# Patient Record
Sex: FEMALE | Race: WHITE | NOT HISPANIC OR LATINO | Employment: OTHER | URBAN - METROPOLITAN AREA
[De-identification: names, ages, dates, MRNs, and addresses within clinical notes are randomized per-mention and may not be internally consistent; named-entity substitution may affect disease eponyms.]

---

## 2021-11-16 ENCOUNTER — TELEPHONE (OUTPATIENT)
Dept: NEUROSURGERY | Facility: CLINIC | Age: 66
End: 2021-11-16
Payer: COMMERCIAL

## 2021-11-18 ENCOUNTER — OFFICE VISIT (OUTPATIENT)
Dept: NEUROSURGERY | Facility: CLINIC | Age: 66
End: 2021-11-18
Payer: COMMERCIAL

## 2021-11-18 VITALS
RESPIRATION RATE: 18 BRPM | BODY MASS INDEX: 57.52 KG/M2 | HEIGHT: 60 IN | HEART RATE: 76 BPM | DIASTOLIC BLOOD PRESSURE: 74 MMHG | SYSTOLIC BLOOD PRESSURE: 128 MMHG | WEIGHT: 293 LBS

## 2021-11-18 DIAGNOSIS — M54.50 NON-SPECIFIC LOW BACK PAIN: ICD-10-CM

## 2021-11-18 DIAGNOSIS — M81.0 AGE-RELATED OSTEOPOROSIS WITHOUT CURRENT PATHOLOGICAL FRACTURE: ICD-10-CM

## 2021-11-18 DIAGNOSIS — M51.36 DDD (DEGENERATIVE DISC DISEASE), LUMBAR: ICD-10-CM

## 2021-11-18 DIAGNOSIS — E66.01 MORBID OBESITY WITH BMI OF 50.0-59.9, ADULT: Primary | ICD-10-CM

## 2021-11-18 DIAGNOSIS — M51.34 DDD (DEGENERATIVE DISC DISEASE), THORACIC: ICD-10-CM

## 2021-11-18 DIAGNOSIS — Z12.31 BREAST CANCER SCREENING BY MAMMOGRAM: ICD-10-CM

## 2021-11-18 DIAGNOSIS — M54.16 LUMBAR RADICULOPATHY: ICD-10-CM

## 2021-11-18 PROCEDURE — 99213 OFFICE O/P EST LOW 20 MIN: CPT | Mod: PBBFAC,PN | Performed by: STUDENT IN AN ORGANIZED HEALTH CARE EDUCATION/TRAINING PROGRAM

## 2021-11-18 PROCEDURE — 99999 PR PBB SHADOW E&M-EST. PATIENT-LVL III: ICD-10-PCS | Mod: PBBFAC,,, | Performed by: STUDENT IN AN ORGANIZED HEALTH CARE EDUCATION/TRAINING PROGRAM

## 2021-11-18 PROCEDURE — 99205 PR OFFICE/OUTPT VISIT, NEW, LEVL V, 60-74 MIN: ICD-10-PCS | Mod: S$GLB,,, | Performed by: STUDENT IN AN ORGANIZED HEALTH CARE EDUCATION/TRAINING PROGRAM

## 2021-11-18 PROCEDURE — 99999 PR PBB SHADOW E&M-EST. PATIENT-LVL III: CPT | Mod: PBBFAC,,, | Performed by: STUDENT IN AN ORGANIZED HEALTH CARE EDUCATION/TRAINING PROGRAM

## 2021-11-18 PROCEDURE — 99205 OFFICE O/P NEW HI 60 MIN: CPT | Mod: S$GLB,,, | Performed by: STUDENT IN AN ORGANIZED HEALTH CARE EDUCATION/TRAINING PROGRAM

## 2021-11-18 RX ORDER — ROSUVASTATIN CALCIUM 10 MG/1
10 TABLET, COATED ORAL NIGHTLY
COMMUNITY
Start: 2021-10-25

## 2021-11-18 RX ORDER — LEVOTHYROXINE SODIUM 175 UG/1
TABLET ORAL
COMMUNITY
Start: 2021-10-25

## 2021-11-19 ENCOUNTER — TELEPHONE (OUTPATIENT)
Dept: NEUROSURGERY | Facility: CLINIC | Age: 66
End: 2021-11-19
Payer: COMMERCIAL

## 2021-11-26 ENCOUNTER — HOSPITAL ENCOUNTER (OUTPATIENT)
Dept: RADIOLOGY | Facility: HOSPITAL | Age: 66
Discharge: HOME | End: 2021-11-26
Attending: STUDENT IN AN ORGANIZED HEALTH CARE EDUCATION/TRAINING PROGRAM
Payer: COMMERCIAL

## 2021-11-26 DIAGNOSIS — M81.0 AGE-RELATED OSTEOPOROSIS WITHOUT CURRENT PATHOLOGICAL FRACTURE: ICD-10-CM

## 2021-11-26 DIAGNOSIS — M51.36 DDD (DEGENERATIVE DISC DISEASE), LUMBAR: ICD-10-CM

## 2021-11-26 PROCEDURE — 77080 DEXA BONE DENSITY SPINE HIP: ICD-10-PCS | Mod: 26,,, | Performed by: RADIOLOGY

## 2021-11-26 PROCEDURE — 77080 DXA BONE DENSITY AXIAL: CPT | Mod: 26,,, | Performed by: RADIOLOGY

## 2021-11-26 PROCEDURE — 77080 DXA BONE DENSITY AXIAL: CPT | Mod: TC,PO

## 2021-12-09 ENCOUNTER — PATIENT MESSAGE (OUTPATIENT)
Dept: NEUROSURGERY | Facility: CLINIC | Age: 66
End: 2021-12-09
Payer: COMMERCIAL

## 2021-12-20 ENCOUNTER — OFFICE VISIT (OUTPATIENT)
Dept: PAIN MEDICINE | Facility: CLINIC | Age: 66
End: 2021-12-20
Payer: COMMERCIAL

## 2021-12-20 VITALS
HEIGHT: 60 IN | SYSTOLIC BLOOD PRESSURE: 173 MMHG | WEIGHT: 293 LBS | HEART RATE: 73 BPM | BODY MASS INDEX: 57.52 KG/M2 | DIASTOLIC BLOOD PRESSURE: 81 MMHG

## 2021-12-20 DIAGNOSIS — M54.16 LUMBAR RADICULOPATHY: Primary | ICD-10-CM

## 2021-12-20 DIAGNOSIS — Z01.818 PRE-OP TESTING: ICD-10-CM

## 2021-12-20 DIAGNOSIS — M51.36 DDD (DEGENERATIVE DISC DISEASE), LUMBAR: ICD-10-CM

## 2021-12-20 PROCEDURE — 99204 PR OFFICE/OUTPT VISIT, NEW, LEVL IV, 45-59 MIN: ICD-10-PCS | Mod: CS,S$GLB,, | Performed by: ANESTHESIOLOGY

## 2021-12-20 PROCEDURE — 99214 OFFICE O/P EST MOD 30 MIN: CPT | Mod: PBBFAC,PN | Performed by: ANESTHESIOLOGY

## 2021-12-20 PROCEDURE — 99999 PR PBB SHADOW E&M-EST. PATIENT-LVL IV: ICD-10-PCS | Mod: PBBFAC,,, | Performed by: ANESTHESIOLOGY

## 2021-12-20 PROCEDURE — 99204 OFFICE O/P NEW MOD 45 MIN: CPT | Mod: CS,S$GLB,, | Performed by: ANESTHESIOLOGY

## 2021-12-20 PROCEDURE — 99999 PR PBB SHADOW E&M-EST. PATIENT-LVL IV: CPT | Mod: PBBFAC,,, | Performed by: ANESTHESIOLOGY

## 2021-12-20 RX ORDER — ALPRAZOLAM 0.5 MG/1
1 TABLET, ORALLY DISINTEGRATING ORAL ONCE AS NEEDED
Status: CANCELLED | OUTPATIENT
Start: 2022-01-03 | End: 2033-05-31

## 2021-12-20 NOTE — H&P (VIEW-ONLY)
[facility] New Patient Evaluation    Referred by: Dr. Singletary  Reason for referral: back pain    CC:   Chief Complaint   Patient presents with    Back Pain     LBP; across lower back; extends down legs sometimes; denies numbness;       No flowsheet data found.    HPI:   Safia Ibarra is a 66 y.o. female who complains of back pain    Onset: 6-7 months  Inciting Event: none  Progression: since onset, pain is gradually worsening  Current Pain Score: 6/10  Typical Range: 2-10/10  Timing: constant  Quality: shooting  Radiation: yes, down the back both legs  Associated numbness or weakness: yes numbness   Exacerbated by: walking  Allievated by: laying down  Is Pain Level Acceptable?: No    Previous Therapies:  PT/OT: yes  HEP: yes  Interventions:   Surgery:  Medications:   - NSAIDS:   - MSK Relaxants:   - TCAs:   - SNRIs:   - Topicals:   - Anticonvulsants:  - Opioids:     History:    Current Outpatient Medications:     levothyroxine (SYNTHROID, LEVOTHROID) 175 MCG tablet, , Disp: , Rfl:     rosuvastatin (CRESTOR) 10 MG tablet, Take 10 mg by mouth nightly., Disp: , Rfl:     Past Medical History:   Diagnosis Date    HTN (hypertension)     Hyperlipidemia     Hypothyroidism     Osteoporosis        Past Surgical History:   Procedure Laterality Date    BREAST BIOPSY Left     core Dr. Hannah benign over 5 yrs ago    HYSTERECTOMY         Family History   Problem Relation Age of Onset    Uterine cancer Mother     Breast cancer Paternal Aunt        Social History     Socioeconomic History    Marital status:    Tobacco Use    Smoking status: Never Smoker    Smokeless tobacco: Never Used       Review of patient's allergies indicates:   Allergen Reactions    Bactrim [sulfamethoxazole-trimethoprim]        Review of Systems:  General ROS: negative for - fever  Psychological ROS: negative for - hostility  Hematological and Lymphatic ROS: negative for - bleeding problems  Endocrine ROS: negative for  - unexpected weight changes  Respiratory ROS: no cough, shortness of breath, or wheezing  Cardiovascular ROS: no chest pain or dyspnea on exertion  Gastrointestinal ROS: no abdominal pain, change in bowel habits, or black or bloody stools  Musculoskeletal ROS: negative for - muscular weakness  Neurological ROS: positive for - numbness/tingling  Dermatological ROS: negative for rash    Physical Exam:  Vitals:    12/20/21 1356   BP: (!) 173/81   Pulse: 73   Weight: (!) 144.4 kg (318 lb 7.3 oz)   Height: 5' (1.524 m)   PainSc:   6     Body mass index is 62.19 kg/m².     Gen: NAD  Gait: gait intact  Psych:  Mood appropriate for given condition  HEENT: eyes anicteric   GI: Abd soft  CV: RRR  Lungs: breathing unlabored   ROM: limited AROM of the L spine in all planes, full ROM at ankles, knees and hips  Lumbar flexion 90 degrees, extension 50 degrees, side bending 30 degrees.    Sensation: intact to light touch in all dermatomes tested from L2-S1 bilaterally  Reflexes: 2+ b/l patella and 0/0 b/l achilles  Palpation: Diffusely tender over lumbar paraspinals  -TTP over the b/l greater trochanters and bilateral SI joint  Tone: normal in the b/l knees and hips   Skin: intact  Extremities: No edema in b/l ankles or hands  Provacative tests:        Right Left   L2/3 Iliacus Hip flexion  5  5   L3/4 Qudratus Femoris Knee Extension  5  5   L4/5 Tib Anterior Ankle Dorsiflexion   5  5   L5/S1 Extensor Hallicus Longus Great toe extension  5  5                 S1/S2 Gastroc/Soleus Plantar Flexion  5  5       Imaging:  MRI lumbar spine 11/15/21  FINDINGS  There is a minor curvature in the lumbar region convex to the patient's left.  There are hypertrophic facet changes with facet arthropathy throughout the lumbar spine.  There is disc space narrowing primarily at L1-L2, L2-L3 and L3-L4.  There is generally stenotic changes in part on a developmental basis throughout the lumbosacral spine with other factors present as well.  The distal  cord terminates at L1-L2 and appears unremarkable.    At L1-L2, there is mild to moderate lumbar stenosis present.  Hypertrophic facet ligamentous changes as well as a broad-based disc protrusion measured in the AP dimension up to 4.8 mm.  There is mild foraminal restriction bilaterally without root contact.     At L2-L3, there is moderate lumbar stenosis present more prominently paracentrally to the right where there is a prominent disc protrusion/extrusion measured up to 6.6 mm likely compressing the traversing nerve root on the right.  There is mild foraminal restriction bilaterally without root contact within the neural foramina.    At L3-L4, there is moderate lumbar stenosis produced by broad based disc protrusion measured up to 5.4 mm in the AP dimension.  Hypertrophic facet ligamentous changes contribute to the lumbar stenosis.  There is foraminal restriction bilaterally more so on the right where there does appear to be contact of the exiting nerve root in the right foramen.    At L4-L5,  there is moderate to marked lumbar stenosis produced by hypertrophic facet and hypertrophic ligamentous changes.  There is a broad-based disc protrusion measured up to 3.9 mm in the AP dimension.  There is foraminal restriction bilaterally without definite root contact within the neural foramina.    At L5-S1,  there is a right paracentral disc protrusion measured up to 3.6 mm which may contact the traversing nerve root on the right.  There is no significant central stenosis.  There is foraminal restriction bilaterally.  There does appear to be contact of the exiting nerve root along its inferior margin in the left foramen.     Labs:  BMP  No results found for: NA, K, CL, CO2, BUN, CREATININE, CALCIUM, ANIONGAP, ESTGFRAFRICA, EGFRNONAA  No results found for: ALT, AST, GGT, ALKPHOS, BILITOT    Assessment:   Problem List Items Addressed This Visit    None     Visit Diagnoses     Lumbar radiculopathy    -  Primary    DDD  (degenerative disc disease), lumbar              66 y.o. year old female presents to the office with back pain, constant, 6/10, radiating down the back of both legs.  + numbness, no weakness.    - on exam full strength and intact sensation to light touch  - I independently reviewed her lumbar MRI and it is c/w L4-5 moderate to marked lumbar stenosis  - she has failed to get relief with formal PT.  - her pain is limiting her mobility and interfering with her ADL's  - we will schedule for L5/S1 NATASHA. The risks and benefits of this intervention, and alternative therapies were discussed with the patient.  The discussion of risks included infection, bleeding, need for additional procedures or surgery, nerve damage.  Questions regarding the procedure, risks, expected outcome, and possible side effects were solicited and answered to the patient's satisfaction.  Kassie Keen wishes to proceed with the injection or procedure.  Written consent was obtained.  - follow up 2 weeks post injection     : Not applicable    Yobani Nieves M.D.  Interventional Pain Medicine / Anesthesiology

## 2022-01-03 ENCOUNTER — HOSPITAL ENCOUNTER (OUTPATIENT)
Dept: RADIOLOGY | Facility: HOSPITAL | Age: 67
Discharge: HOME | End: 2022-01-03
Attending: ANESTHESIOLOGY
Payer: MEDICARE

## 2022-01-03 ENCOUNTER — HOSPITAL ENCOUNTER (OUTPATIENT)
Facility: HOSPITAL | Age: 67
Discharge: HOME | End: 2022-01-03
Attending: ANESTHESIOLOGY | Admitting: ANESTHESIOLOGY
Payer: COMMERCIAL

## 2022-01-03 VITALS
RESPIRATION RATE: 16 BRPM | DIASTOLIC BLOOD PRESSURE: 62 MMHG | OXYGEN SATURATION: 97 % | SYSTOLIC BLOOD PRESSURE: 139 MMHG | WEIGHT: 293 LBS | TEMPERATURE: 98 F | BODY MASS INDEX: 57.52 KG/M2 | HEIGHT: 60 IN | HEART RATE: 73 BPM

## 2022-01-03 DIAGNOSIS — Z01.818 PREOP TESTING: ICD-10-CM

## 2022-01-03 DIAGNOSIS — M54.16 LUMBAR RADICULOPATHY: Primary | ICD-10-CM

## 2022-01-03 DIAGNOSIS — M54.16 LUMBAR RADICULOPATHY: ICD-10-CM

## 2022-01-03 LAB
CTP QC/QA: YES
SARS-COV-2 AG RESP QL IA.RAPID: NEGATIVE

## 2022-01-03 PROCEDURE — A4216 STERILE WATER/SALINE, 10 ML: HCPCS | Mod: PO | Performed by: ANESTHESIOLOGY

## 2022-01-03 PROCEDURE — 76000 FLUOROSCOPY <1 HR PHYS/QHP: CPT | Mod: TC,PO

## 2022-01-03 PROCEDURE — 62323 NJX INTERLAMINAR LMBR/SAC: CPT | Mod: ,,, | Performed by: ANESTHESIOLOGY

## 2022-01-03 PROCEDURE — 62323 NJX INTERLAMINAR LMBR/SAC: CPT | Mod: PO | Performed by: ANESTHESIOLOGY

## 2022-01-03 PROCEDURE — 25000003 PHARM REV CODE 250: Mod: PO | Performed by: ANESTHESIOLOGY

## 2022-01-03 PROCEDURE — 25500020 PHARM REV CODE 255: Mod: PO | Performed by: ANESTHESIOLOGY

## 2022-01-03 PROCEDURE — 62323 PR INJ LUMBAR/SACRAL, W/IMAGING GUIDANCE: ICD-10-PCS | Mod: ,,, | Performed by: ANESTHESIOLOGY

## 2022-01-03 PROCEDURE — 63600175 PHARM REV CODE 636 W HCPCS: Mod: PO | Performed by: ANESTHESIOLOGY

## 2022-01-03 RX ORDER — SODIUM CHLORIDE 9 MG/ML
INJECTION, SOLUTION INTRAMUSCULAR; INTRAVENOUS; SUBCUTANEOUS
Status: DISCONTINUED | OUTPATIENT
Start: 2022-01-03 | End: 2022-01-03 | Stop reason: HOSPADM

## 2022-01-03 RX ORDER — ALPRAZOLAM 0.5 MG/1
1 TABLET, ORALLY DISINTEGRATING ORAL ONCE AS NEEDED
Status: DISCONTINUED | OUTPATIENT
Start: 2022-01-03 | End: 2022-01-03 | Stop reason: HOSPADM

## 2022-01-03 RX ORDER — METHYLPREDNISOLONE ACETATE 80 MG/ML
INJECTION, SUSPENSION INTRA-ARTICULAR; INTRALESIONAL; INTRAMUSCULAR; SOFT TISSUE
Status: DISCONTINUED | OUTPATIENT
Start: 2022-01-03 | End: 2022-01-03 | Stop reason: HOSPADM

## 2022-01-03 RX ORDER — LIDOCAINE HYDROCHLORIDE 10 MG/ML
INJECTION, SOLUTION EPIDURAL; INFILTRATION; INTRACAUDAL; PERINEURAL
Status: DISCONTINUED | OUTPATIENT
Start: 2022-01-03 | End: 2022-01-03 | Stop reason: HOSPADM

## 2022-01-03 RX ORDER — HYDROCODONE BITARTRATE AND ACETAMINOPHEN 5; 325 MG/1; MG/1
1 TABLET ORAL EVERY 6 HOURS PRN
COMMUNITY
End: 2023-05-09

## 2022-01-03 NOTE — OP NOTE
"Procedure Note    Procedure Date: 1/3/2022    Procedure Performed:  L5/S1 lumbar interlaminar epidural steroid injection under fluoroscopy.    Indications: Patient has failed conservative therapy.      Pre-op diagnosis: Lumbar Radiculopathy    Post-op diagnosis: same    Physician: Yobani Nieves MD    IV Sedation medications: none    Medications injected: depomedrol 80mg, 1% Lidocaine 1ml, 2 mL sterile, preservative-free normal saline.    Local anesthetic used: 1% Lidocaine, 1 ml, 8.4% sodium bicarbonate 0.25ml    Estimated Blood Loss: none    Complications:  none    Technique:  The patient was interviewed in the holding area and Risks/Benefits were discussed, including, but not limited to, the possibility of new or different pain, bleeding or infection.   All questions were answered.  The patient understood and accepted risks.  Consent was verfied.  A time-out was taken to identify patient and procedure prior to starting the procedure. The patient was placed in the prone position on the fluoroscopy table. The area of the lumbar spine was prepped with Chloraprep and draped in a sterile manner. The L5/S1 interspace was identified and marked under AP fluoroscopy. The skin and subcutaneous tissues overlying the targeted interspace were anesthetized with 3-5 mL of 1% lidocaine using a 25G, 1.5" needle.  A 18G, 6" Tuohy epidural needle was directed toward the interspace under fluoroscopic guidance until the ligamentum flavum was engaged. From this point, a loss of resistance technique with a glass syringe and saline was used to identify entrance of the needle into the epidural space. Once loss of resistance was observed 1 mL of contrast solution was injected. An appropriate epidurogram was noted.  A 4 mL mixture consisting of saline, 1 mL 1% Lidocaine and 80 mg of depomedrol was injected slowly and without resistance.  The needle was flushed with normal saline and removed. The contrast was seen to be displaced after " injection. Patient was awake/responsive during all injections.  The patient tolerated the procedure well and was transferred to the [facility] in stable condition.  The patient was monitored after the procedure and was given post-procedure and discharge instructions to follow at home. The patient was discharged in a stable condition.

## 2022-01-03 NOTE — PLAN OF CARE
Patient tolerating oral liquids without difficulty. No apparent s&s of distress noted at this time, no complaints voiced at this time. Injection site free from redness and drainage.  Discharge instructions reviewed with patient/family/friend with good verbal feedback received. Patient ready for discharge    
VSS, all questions answered. Denies recent fever or illness. Pt states ready for procedure.  
no

## 2022-01-03 NOTE — DISCHARGE SUMMARY
[facility]  Discharge Note  Short Stay    Admit Date: 1/3/2022    Discharge Date: 1/3/2022    Attending Physician: Yobani Nieves     Discharge Provider: Yobani Nieves    Diagnoses:  There are no hospital problems to display for this patient.      Discharged Condition: Good    Final Diagnoses: Lumbar radiculopathy [M54.16]    Disposition: Home or Self Care    Hospital Course: No complications, uneventful    Outcome of Hospitalization, Treatment, Procedure, or Surgery:  Patient was admitted for outpatient interventional pain management procedure. The patient tolerated the procedure well with no complications.    Follow up/Patient Instructions:  Follow up as scheduled in Pain Management office in 2-3 weeks.  Patient has received instructions and follow up date and time.    Medications:  Continue previous medications    Discharge Procedure Orders   Notify your health care provider if you experience any of the following:  temperature >100.4     Notify your health care provider if you experience any of the following:  persistent nausea and vomiting or diarrhea     Notify your health care provider if you experience any of the following:  severe uncontrolled pain     Notify your health care provider if you experience any of the following:  redness, tenderness, or signs of infection (pain, swelling, redness, odor or green/yellow discharge around incision site)     Notify your health care provider if you experience any of the following:  difficulty breathing or increased cough     Notify your health care provider if you experience any of the following:  severe persistent headache     Notify your health care provider if you experience any of the following:  worsening rash     Notify your health care provider if you experience any of the following:  persistent dizziness, light-headedness, or visual disturbances     Notify your health care provider if you experience any of the following:  increased confusion or  weakness     SARS Coronavirus 2 Antigen, POCT Manual Read     Activity as tolerated

## 2022-01-03 NOTE — INTERVAL H&P NOTE
The patient has been examined and the H&P has been reviewed:    I concur with the findings and no changes have occurred since H&P was written.    There are no hospital problems to display for this patient.

## 2022-01-03 NOTE — DISCHARGE INSTRUCTIONS
PAIN MANAGEMENT    Home care instructions   Apply ice pack to the injection site for 20 minutes prior for the first 24 hours for soreness/discomfort at injection site   DO NOT USE HEAT FOR 24 HOURS   Keep site clean and dry for 24 hours, remove bandaid when desired   Do not drive until tomorrow  Take care when walking after a lumbar injection     STEROIDS OR RADIOFREQUENCY    May take 10-14 days for full effects  Avoid strenuous exercises for 2 days    Resume Aspirin, Plavix, or Coumadin the day after the procedure unless other wise instructed  Resume home medication as prescribed today      CALL PHYSICIAN FOR:   Severe increase in your usual pain or appearance of new pain   Prolonged or increasing weakness or numbness in the legs or arms   Fever greater than 100 degrees F.   Drainage from the incision site, redness, active bleeding or increased swelling at the injection site   Headache that increases when your head is upright and decreases when you lie flat    FOR EMERGENCIES:   Go directly to Emergency Department for Shortness of breath, chest pain, or problems breathing

## 2022-01-07 ENCOUNTER — PATIENT MESSAGE (OUTPATIENT)
Dept: BARIATRICS | Facility: CLINIC | Age: 67
End: 2022-01-07
Payer: COMMERCIAL

## 2022-09-07 ENCOUNTER — TELEPHONE (OUTPATIENT)
Dept: PAIN MEDICINE | Facility: CLINIC | Age: 67
End: 2022-09-07
Payer: COMMERCIAL

## 2022-09-07 NOTE — TELEPHONE ENCOUNTER
----- Message from Katya Green sent at 9/7/2022  3:44 PM CDT -----  Contact: [phone]  Type: Needs Medical Advice  Who Called: Pt     Best Call Back Number: [phone]    Additional Information: Pt is calling to see if she can move her appt on Friday 8AM to possibly 9am because her ride wont be avil to get her there. Pls call back and advise

## 2022-09-07 NOTE — TELEPHONE ENCOUNTER
----- Message from Yuriy Russ sent at 9/7/2022  8:46 AM CDT -----  Type: Needs Medical Advice  Who Called: Pt   Symptoms (please be specific):   How long has patient had these symptoms:    Pharmacy name and phone #:    Best Call Back Number: [phone]  Additional Information: Pt requesting a call back concerning for appt for Friday for an injection.Pt wants to see if she can come an hour later that day.

## 2022-09-07 NOTE — TELEPHONE ENCOUNTER
----- Message from Brooklynn Herzog sent at 9/7/2022  4:49 PM CDT -----  Contact: Pt at [phone]  Type: Needs Medical Advice  Who Called:  Pt  Symptoms (please be specific):  keep appt  Pharmacy name and phone #:    Best Call Back Number: [phone]  Additional Information: Jessica is gonna keep her appt on Friday

## 2022-09-07 NOTE — TELEPHONE ENCOUNTER
Call returned to Pt who called to see if she can move her appt on Friday 8AM to possibly 9am because of issues with transportation. No answer. V/M left to inform her there are no other avail appt's on that day.

## 2022-09-09 ENCOUNTER — OFFICE VISIT (OUTPATIENT)
Dept: PAIN MEDICINE | Facility: CLINIC | Age: 67
End: 2022-09-09
Payer: COMMERCIAL

## 2022-09-09 ENCOUNTER — HOSPITAL ENCOUNTER (OUTPATIENT)
Dept: RADIOLOGY | Facility: HOSPITAL | Age: 67
Discharge: HOME | End: 2022-09-09
Attending: ANESTHESIOLOGY
Payer: COMMERCIAL

## 2022-09-09 VITALS
BODY MASS INDEX: 57.52 KG/M2 | HEART RATE: 68 BPM | SYSTOLIC BLOOD PRESSURE: 199 MMHG | DIASTOLIC BLOOD PRESSURE: 95 MMHG | HEIGHT: 60 IN | WEIGHT: 293 LBS

## 2022-09-09 DIAGNOSIS — M48.062 SPINAL STENOSIS OF LUMBAR REGION WITH NEUROGENIC CLAUDICATION: ICD-10-CM

## 2022-09-09 DIAGNOSIS — M54.16 LUMBAR RADICULOPATHY: ICD-10-CM

## 2022-09-09 DIAGNOSIS — M54.16 LUMBAR RADICULOPATHY: Primary | ICD-10-CM

## 2022-09-09 PROCEDURE — 99214 PR OFFICE/OUTPT VISIT, EST, LEVL IV, 30-39 MIN: ICD-10-PCS | Mod: S$GLB,,, | Performed by: ANESTHESIOLOGY

## 2022-09-09 PROCEDURE — 99214 OFFICE O/P EST MOD 30 MIN: CPT | Mod: S$GLB,,, | Performed by: ANESTHESIOLOGY

## 2022-09-09 PROCEDURE — 99999 PR PBB SHADOW E&M-EST. PATIENT-LVL IV: CPT | Mod: PBBFAC,,, | Performed by: ANESTHESIOLOGY

## 2022-09-09 PROCEDURE — 72114 XR LUMBAR SPINE 5 VIEW WITH FLEX AND EXT: ICD-10-PCS | Mod: 26,,, | Performed by: RADIOLOGY

## 2022-09-09 PROCEDURE — 72114 X-RAY EXAM L-S SPINE BENDING: CPT | Mod: TC,PO

## 2022-09-09 PROCEDURE — 99999 PR PBB SHADOW E&M-EST. PATIENT-LVL IV: ICD-10-PCS | Mod: PBBFAC,,, | Performed by: ANESTHESIOLOGY

## 2022-09-09 PROCEDURE — 99214 OFFICE O/P EST MOD 30 MIN: CPT | Mod: PBBFAC,PN | Performed by: ANESTHESIOLOGY

## 2022-09-09 PROCEDURE — 72114 X-RAY EXAM L-S SPINE BENDING: CPT | Mod: 26,,, | Performed by: RADIOLOGY

## 2022-09-09 RX ORDER — ALPRAZOLAM 0.5 MG/1
1 TABLET, ORALLY DISINTEGRATING ORAL ONCE AS NEEDED
Status: CANCELLED | OUTPATIENT
Start: 2022-09-09 | End: 2034-02-05

## 2022-09-09 RX ORDER — LEVOTHYROXINE SODIUM 88 UG/1
88 TABLET ORAL
COMMUNITY
End: 2022-09-09

## 2022-09-09 RX ORDER — TIZANIDINE 4 MG/1
4 TABLET ORAL EVERY 8 HOURS PRN
COMMUNITY
Start: 2022-08-29 | End: 2023-08-09

## 2022-09-09 RX ORDER — LEVOTHYROXINE SODIUM 100 UG/1
100 TABLET ORAL
COMMUNITY
End: 2022-09-09

## 2022-09-09 NOTE — H&P (VIEW-ONLY)
[facility] Follow Up Evaluation    Referred by: Dr. Singletary  Reason for referral: back pain    CC:   Chief Complaint   Patient presents with    Low-back Pain    Mid-back Pain      Last 3 PDI Scores 9/9/2022   Pain Disability Index (PDI) 41       Interval HPI 9/9/22:  Ms. Ibarra returns the office today for follow-up.  She is status post L5-S1 NATASHA on 01/03/2022.  She reports she had about 80% relief of her pain following that injection however it has worn off.  Today she reports lower back pain that is worse with walking.  She can also get cramping in her legs.  Denies any new numbness or weakness.    HPI:   Safia Ibarra is a 67 y.o. female who complains of back pain    Onset: 6-7 months  Inciting Event: none  Progression: since onset, pain is gradually worsening  Current Pain Score: 6/10  Typical Range: 2-10/10  Timing: constant  Quality: shooting  Radiation: yes, down the back both legs  Associated numbness or weakness: yes numbness   Exacerbated by: walking  Allievated by: laying down  Is Pain Level Acceptable?: No    Previous Therapies:  PT/OT: yes  HEP: yes  Interventions:   Surgery:  Medications:   - NSAIDS:   - MSK Relaxants:   - TCAs:   - SNRIs:   - Topicals:   - Anticonvulsants:  - Opioids:     History:    Current Outpatient Medications:     HYDROcodone-acetaminophen (NORCO) 5-325 mg per tablet, Take 1 tablet by mouth every 6 (six) hours as needed for Pain., Disp: , Rfl:     levothyroxine (SYNTHROID, LEVOTHROID) 175 MCG tablet, , Disp: , Rfl:     rosuvastatin (CRESTOR) 10 MG tablet, Take 10 mg by mouth nightly., Disp: , Rfl:     tiZANidine (ZANAFLEX) 4 MG tablet, Take 4 mg by mouth every 8 (eight) hours as needed., Disp: , Rfl:     Past Medical History:   Diagnosis Date    HTN (hypertension)     Hyperlipidemia     Hypothyroidism     Osteoporosis        Past Surgical History:   Procedure Laterality Date    BREAST BIOPSY Left     core Dr. Hannah benign over 5 yrs ago    EPIDURAL STEROID  INJECTION INTO LUMBAR SPINE N/A 1/3/2022    Procedure: Injection-steroid-epidural-lumbar L5/S1;  Surgeon: Yobani Nieves MD;  Location: [facility];  Service: Pain Management;  Laterality: N/A;    HYSTERECTOMY         Family History   Problem Relation Age of Onset    Uterine cancer Mother     Breast cancer Paternal Aunt        Social History     Socioeconomic History    Marital status:    Tobacco Use    Smoking status: Never    Smokeless tobacco: Never       Review of patient's allergies indicates:   Allergen Reactions    Bactrim [sulfamethoxazole-trimethoprim] Itching       Review of Systems:  General ROS: negative for - fever  Psychological ROS: negative for - hostility  Hematological and Lymphatic ROS: negative for - bleeding problems  Endocrine ROS: negative for - unexpected weight changes  Respiratory ROS: no cough, shortness of breath, or wheezing  Cardiovascular ROS: no chest pain or dyspnea on exertion  Gastrointestinal ROS: no abdominal pain, change in bowel habits, or black or bloody stools  Musculoskeletal ROS: negative for - muscular weakness  Neurological ROS: positive for - numbness/tingling  Dermatological ROS: negative for rash    Physical Exam:  Vitals:    09/09/22 0750 09/09/22 0757   BP: (!) 212/95 (!) 199/95   Pulse: 71 68   Weight: (!) 139.4 kg (307 lb 5.1 oz)    Height: 5' (1.524 m)    PainSc:   9    PainLoc: Back      Body mass index is 60.02 kg/m².     Gen: NAD  Gait: gait intact  Psych:  Mood appropriate for given condition  HEENT: eyes anicteric   GI: Abd soft  CV: RRR  Lungs: breathing unlabored   ROM: limited AROM of the L spine in all planes, full ROM at ankles, knees and hips  Lumbar flexion 90 degrees, extension 50 degrees, side bending 30 degrees.    Sensation: intact to light touch in all dermatomes tested from L2-S1 bilaterally  Reflexes: 2+ b/l patella and 0/0 b/l achilles  Palpation: Diffusely tender over lumbar paraspinals   Tone: normal in the b/l knees and hips   Skin:  intact  Extremities: No edema in b/l ankles or hands  Provacative tests:        Right Left   L2/3 Iliacus Hip flexion  5  5   L3/4 Qudratus Femoris Knee Extension  5  5   L4/5 Tib Anterior Ankle Dorsiflexion   5  5   L5/S1 Extensor Hallicus Longus Great toe extension  5  5                 S1/S2 Gastroc/Soleus Plantar Flexion  5  5       Imaging:  MRI lumbar spine 11/15/21  FINDINGS  There is a minor curvature in the lumbar region convex to the patient's left.  There are hypertrophic facet changes with facet arthropathy throughout the lumbar spine.  There is disc space narrowing primarily at L1-L2, L2-L3 and L3-L4.  There is generally stenotic changes in part on a developmental basis throughout the lumbosacral spine with other factors present as well.  The distal cord terminates at L1-L2 and appears unremarkable.    At L1-L2, there is mild to moderate lumbar stenosis present.  Hypertrophic facet ligamentous changes as well as a broad-based disc protrusion measured in the AP dimension up to 4.8 mm.  There is mild foraminal restriction bilaterally without root contact.     At L2-L3, there is moderate lumbar stenosis present more prominently paracentrally to the right where there is a prominent disc protrusion/extrusion measured up to 6.6 mm likely compressing the traversing nerve root on the right.  There is mild foraminal restriction bilaterally without root contact within the neural foramina.    At L3-L4, there is moderate lumbar stenosis produced by broad based disc protrusion measured up to 5.4 mm in the AP dimension.  Hypertrophic facet ligamentous changes contribute to the lumbar stenosis.  There is foraminal restriction bilaterally more so on the right where there does appear to be contact of the exiting nerve root in the right foramen.    At L4-L5,  there is moderate to marked lumbar stenosis produced by hypertrophic facet and hypertrophic ligamentous changes.  There is a broad-based disc protrusion measured up  to 3.9 mm in the AP dimension.  There is foraminal restriction bilaterally without definite root contact within the neural foramina.    At L5-S1,  there is a right paracentral disc protrusion measured up to 3.6 mm which may contact the traversing nerve root on the right.  There is no significant central stenosis.  There is foraminal restriction bilaterally.  There does appear to be contact of the exiting nerve root along its inferior margin in the left foramen.     Labs:  BMP  No results found for: NA, K, CL, CO2, BUN, CREATININE, CALCIUM, ANIONGAP, ESTGFRAFRICA, EGFRNONAA  No results found for: ALT, AST, GGT, ALKPHOS, BILITOT    Assessment:   Problem List Items Addressed This Visit    None  Visit Diagnoses       Lumbar radiculopathy    -  Primary    Relevant Orders    X-Ray Lumbar Complete Including Flex And Ext    Spinal stenosis of lumbar region with neurogenic claudication                  67 y.o. year old female presents to the office with back pain, constant, 6/10, radiating down the back of both legs.  + numbness, no weakness.    9/9/22 - Ms. Ibarra returns the office today for follow-up.  She is status post L5-S1 NATASHA on 01/03/2022.  She reports she had about 80% relief of her pain following that injection however it has worn off.  Today she reports lower back pain that is worse with walking.  She can also get cramping in her legs.  Denies any new numbness or weakness.    - on exam full strength and intact sensation to light touch  - I independently reviewed her lumbar MRI and it is c/w L4-5 moderate to marked lumbar stenosis  - she has completed formal physical therapy in the past then did not get significant relief from this  - she has previously seen Neurosurgery who referred her for conservative treatment options  - her pain is limiting her mobility and interfering with her ADL's  - we will schedule for repeat L5/S1 NATASHA. The risks and benefits of this intervention, and alternative therapies were discussed  with the patient.  The discussion of risks included infection, bleeding, need for additional procedures or surgery, nerve damage.  Questions regarding the procedure, risks, expected outcome, and possible side effects were solicited and answered to the patient's satisfaction.  Kassie Keen wishes to proceed with the injection or procedure.  Written consent was obtained.  - follow up 2 weeks post injection     : Not applicable    Yobani Nieves M.D.  Interventional Pain Medicine / Anesthesiology

## 2022-09-09 NOTE — PROGRESS NOTES
[facility] Follow Up Evaluation    Referred by: Dr. Singletary  Reason for referral: back pain    CC:   Chief Complaint   Patient presents with    Low-back Pain    Mid-back Pain      Last 3 PDI Scores 9/9/2022   Pain Disability Index (PDI) 41       Interval HPI 9/9/22:  Ms. Ibarra returns the office today for follow-up.  She is status post L5-S1 NATASHA on 01/03/2022.  She reports she had about 80% relief of her pain following that injection however it has worn off.  Today she reports lower back pain that is worse with walking.  She can also get cramping in her legs.  Denies any new numbness or weakness.    HPI:   Safia Ibarra is a 67 y.o. female who complains of back pain    Onset: 6-7 months  Inciting Event: none  Progression: since onset, pain is gradually worsening  Current Pain Score: 6/10  Typical Range: 2-10/10  Timing: constant  Quality: shooting  Radiation: yes, down the back both legs  Associated numbness or weakness: yes numbness   Exacerbated by: walking  Allievated by: laying down  Is Pain Level Acceptable?: No    Previous Therapies:  PT/OT: yes  HEP: yes  Interventions:   Surgery:  Medications:   - NSAIDS:   - MSK Relaxants:   - TCAs:   - SNRIs:   - Topicals:   - Anticonvulsants:  - Opioids:     History:    Current Outpatient Medications:     HYDROcodone-acetaminophen (NORCO) 5-325 mg per tablet, Take 1 tablet by mouth every 6 (six) hours as needed for Pain., Disp: , Rfl:     levothyroxine (SYNTHROID, LEVOTHROID) 175 MCG tablet, , Disp: , Rfl:     rosuvastatin (CRESTOR) 10 MG tablet, Take 10 mg by mouth nightly., Disp: , Rfl:     tiZANidine (ZANAFLEX) 4 MG tablet, Take 4 mg by mouth every 8 (eight) hours as needed., Disp: , Rfl:     Past Medical History:   Diagnosis Date    HTN (hypertension)     Hyperlipidemia     Hypothyroidism     Osteoporosis        Past Surgical History:   Procedure Laterality Date    BREAST BIOPSY Left     core Dr. Hannah benign over 5 yrs ago    EPIDURAL STEROID  INJECTION INTO LUMBAR SPINE N/A 1/3/2022    Procedure: Injection-steroid-epidural-lumbar L5/S1;  Surgeon: Yobani Nieves MD;  Location: [facility];  Service: Pain Management;  Laterality: N/A;    HYSTERECTOMY         Family History   Problem Relation Age of Onset    Uterine cancer Mother     Breast cancer Paternal Aunt        Social History     Socioeconomic History    Marital status:    Tobacco Use    Smoking status: Never    Smokeless tobacco: Never       Review of patient's allergies indicates:   Allergen Reactions    Bactrim [sulfamethoxazole-trimethoprim] Itching       Review of Systems:  General ROS: negative for - fever  Psychological ROS: negative for - hostility  Hematological and Lymphatic ROS: negative for - bleeding problems  Endocrine ROS: negative for - unexpected weight changes  Respiratory ROS: no cough, shortness of breath, or wheezing  Cardiovascular ROS: no chest pain or dyspnea on exertion  Gastrointestinal ROS: no abdominal pain, change in bowel habits, or black or bloody stools  Musculoskeletal ROS: negative for - muscular weakness  Neurological ROS: positive for - numbness/tingling  Dermatological ROS: negative for rash    Physical Exam:  Vitals:    09/09/22 0750 09/09/22 0757   BP: (!) 212/95 (!) 199/95   Pulse: 71 68   Weight: (!) 139.4 kg (307 lb 5.1 oz)    Height: 5' (1.524 m)    PainSc:   9    PainLoc: Back      Body mass index is 60.02 kg/m².     Gen: NAD  Gait: gait intact  Psych:  Mood appropriate for given condition  HEENT: eyes anicteric   GI: Abd soft  CV: RRR  Lungs: breathing unlabored   ROM: limited AROM of the L spine in all planes, full ROM at ankles, knees and hips  Lumbar flexion 90 degrees, extension 50 degrees, side bending 30 degrees.    Sensation: intact to light touch in all dermatomes tested from L2-S1 bilaterally  Reflexes: 2+ b/l patella and 0/0 b/l achilles  Palpation: Diffusely tender over lumbar paraspinals   Tone: normal in the b/l knees and hips   Skin:  intact  Extremities: No edema in b/l ankles or hands  Provacative tests:        Right Left   L2/3 Iliacus Hip flexion  5  5   L3/4 Qudratus Femoris Knee Extension  5  5   L4/5 Tib Anterior Ankle Dorsiflexion   5  5   L5/S1 Extensor Hallicus Longus Great toe extension  5  5                 S1/S2 Gastroc/Soleus Plantar Flexion  5  5       Imaging:  MRI lumbar spine 11/15/21  FINDINGS  There is a minor curvature in the lumbar region convex to the patient's left.  There are hypertrophic facet changes with facet arthropathy throughout the lumbar spine.  There is disc space narrowing primarily at L1-L2, L2-L3 and L3-L4.  There is generally stenotic changes in part on a developmental basis throughout the lumbosacral spine with other factors present as well.  The distal cord terminates at L1-L2 and appears unremarkable.    At L1-L2, there is mild to moderate lumbar stenosis present.  Hypertrophic facet ligamentous changes as well as a broad-based disc protrusion measured in the AP dimension up to 4.8 mm.  There is mild foraminal restriction bilaterally without root contact.     At L2-L3, there is moderate lumbar stenosis present more prominently paracentrally to the right where there is a prominent disc protrusion/extrusion measured up to 6.6 mm likely compressing the traversing nerve root on the right.  There is mild foraminal restriction bilaterally without root contact within the neural foramina.    At L3-L4, there is moderate lumbar stenosis produced by broad based disc protrusion measured up to 5.4 mm in the AP dimension.  Hypertrophic facet ligamentous changes contribute to the lumbar stenosis.  There is foraminal restriction bilaterally more so on the right where there does appear to be contact of the exiting nerve root in the right foramen.    At L4-L5,  there is moderate to marked lumbar stenosis produced by hypertrophic facet and hypertrophic ligamentous changes.  There is a broad-based disc protrusion measured up  to 3.9 mm in the AP dimension.  There is foraminal restriction bilaterally without definite root contact within the neural foramina.    At L5-S1,  there is a right paracentral disc protrusion measured up to 3.6 mm which may contact the traversing nerve root on the right.  There is no significant central stenosis.  There is foraminal restriction bilaterally.  There does appear to be contact of the exiting nerve root along its inferior margin in the left foramen.     Labs:  BMP  No results found for: NA, K, CL, CO2, BUN, CREATININE, CALCIUM, ANIONGAP, ESTGFRAFRICA, EGFRNONAA  No results found for: ALT, AST, GGT, ALKPHOS, BILITOT    Assessment:   Problem List Items Addressed This Visit    None  Visit Diagnoses       Lumbar radiculopathy    -  Primary    Relevant Orders    X-Ray Lumbar Complete Including Flex And Ext    Spinal stenosis of lumbar region with neurogenic claudication                  67 y.o. year old female presents to the office with back pain, constant, 6/10, radiating down the back of both legs.  + numbness, no weakness.    9/9/22 - Ms. Ibarra returns the office today for follow-up.  She is status post L5-S1 NATASHA on 01/03/2022.  She reports she had about 80% relief of her pain following that injection however it has worn off.  Today she reports lower back pain that is worse with walking.  She can also get cramping in her legs.  Denies any new numbness or weakness.    - on exam full strength and intact sensation to light touch  - I independently reviewed her lumbar MRI and it is c/w L4-5 moderate to marked lumbar stenosis  - she has completed formal physical therapy in the past then did not get significant relief from this  - she has previously seen Neurosurgery who referred her for conservative treatment options  - her pain is limiting her mobility and interfering with her ADL's  - we will schedule for repeat L5/S1 NATASHA. The risks and benefits of this intervention, and alternative therapies were discussed  with the patient.  The discussion of risks included infection, bleeding, need for additional procedures or surgery, nerve damage.  Questions regarding the procedure, risks, expected outcome, and possible side effects were solicited and answered to the patient's satisfaction.  Kassie Keen wishes to proceed with the injection or procedure.  Written consent was obtained.  - follow up 2 weeks post injection     : Not applicable    Yobani Nieves M.D.  Interventional Pain Medicine / Anesthesiology

## 2022-09-15 RX ORDER — AMLODIPINE BESYLATE 5 MG/1
5 TABLET ORAL DAILY
COMMUNITY
End: 2024-03-12

## 2022-09-21 ENCOUNTER — HOSPITAL ENCOUNTER (OUTPATIENT)
Dept: RADIOLOGY | Facility: HOSPITAL | Age: 67
Discharge: HOME | End: 2022-09-21
Attending: ANESTHESIOLOGY | Admitting: ANESTHESIOLOGY
Payer: COMMERCIAL

## 2022-09-21 ENCOUNTER — HOSPITAL ENCOUNTER (OUTPATIENT)
Facility: HOSPITAL | Age: 67
Discharge: HOME | End: 2022-09-21
Attending: ANESTHESIOLOGY | Admitting: ANESTHESIOLOGY
Payer: COMMERCIAL

## 2022-09-21 DIAGNOSIS — M54.50 LOWER BACK PAIN: ICD-10-CM

## 2022-09-21 DIAGNOSIS — M54.16 LUMBAR RADICULOPATHY: Primary | ICD-10-CM

## 2022-09-21 PROCEDURE — 62323 NJX INTERLAMINAR LMBR/SAC: CPT | Mod: ,,, | Performed by: ANESTHESIOLOGY

## 2022-09-21 PROCEDURE — 25500020 PHARM REV CODE 255: Mod: PO | Performed by: ANESTHESIOLOGY

## 2022-09-21 PROCEDURE — 25000003 PHARM REV CODE 250: Mod: PO | Performed by: ANESTHESIOLOGY

## 2022-09-21 PROCEDURE — 76000 FLUOROSCOPY <1 HR PHYS/QHP: CPT | Mod: TC,PO

## 2022-09-21 PROCEDURE — 62323 PR INJ LUMBAR/SACRAL, W/IMAGING GUIDANCE: ICD-10-PCS | Mod: ,,, | Performed by: ANESTHESIOLOGY

## 2022-09-21 PROCEDURE — 62323 NJX INTERLAMINAR LMBR/SAC: CPT | Mod: PO | Performed by: ANESTHESIOLOGY

## 2022-09-21 PROCEDURE — 63600175 PHARM REV CODE 636 W HCPCS: Mod: PO | Performed by: ANESTHESIOLOGY

## 2022-09-21 RX ORDER — METHYLPREDNISOLONE ACETATE 80 MG/ML
INJECTION, SUSPENSION INTRA-ARTICULAR; INTRALESIONAL; INTRAMUSCULAR; SOFT TISSUE
Status: DISCONTINUED | OUTPATIENT
Start: 2022-09-21 | End: 2022-09-21 | Stop reason: HOSPADM

## 2022-09-21 RX ORDER — SODIUM CHLORIDE 0.9 G/100ML
IRRIGANT IRRIGATION
Status: DISCONTINUED | OUTPATIENT
Start: 2022-09-21 | End: 2022-09-21 | Stop reason: HOSPADM

## 2022-09-21 RX ORDER — ALPRAZOLAM 0.5 MG/1
1 TABLET, ORALLY DISINTEGRATING ORAL ONCE AS NEEDED
Status: COMPLETED | OUTPATIENT
Start: 2022-09-21 | End: 2022-09-21

## 2022-09-21 RX ORDER — LIDOCAINE HYDROCHLORIDE 10 MG/ML
INJECTION, SOLUTION EPIDURAL; INFILTRATION; INTRACAUDAL; PERINEURAL
Status: DISCONTINUED | OUTPATIENT
Start: 2022-09-21 | End: 2022-09-21 | Stop reason: HOSPADM

## 2022-09-21 RX ADMIN — ALPRAZOLAM 1 MG: 0.5 TABLET, ORALLY DISINTEGRATING ORAL at 01:09

## 2022-09-21 NOTE — DISCHARGE SUMMARY
[facility]  Discharge Note  Short Stay    Admit Date: 9/21/2022    Discharge Date: 9/21/2022    Attending Physician: Yobani Nieves     Discharge Provider: Yobani Nieves    Diagnoses:  There are no hospital problems to display for this patient.      Discharged Condition: Good    Final Diagnoses: Lumbar radiculopathy [M54.16]    Disposition: Home or Self Care    Hospital Course: No complications, uneventful    Outcome of Hospitalization, Treatment, Procedure, or Surgery:  Patient was admitted for outpatient interventional pain management procedure. The patient tolerated the procedure well with no complications.    Follow up/Patient Instructions:  Follow up as scheduled in Pain Management office in 2-3 weeks.  Patient has received instructions and follow up date and time.    Medications:  Continue previous medications    Discharge Procedure Orders   Notify your health care provider if you experience any of the following:  temperature >100.4     Notify your health care provider if you experience any of the following:  persistent nausea and vomiting or diarrhea     Notify your health care provider if you experience any of the following:  severe uncontrolled pain     Notify your health care provider if you experience any of the following:  redness, tenderness, or signs of infection (pain, swelling, redness, odor or green/yellow discharge around incision site)     Notify your health care provider if you experience any of the following:  difficulty breathing or increased cough     Notify your health care provider if you experience any of the following:  severe persistent headache     Notify your health care provider if you experience any of the following:  worsening rash     Notify your health care provider if you experience any of the following:  persistent dizziness, light-headedness, or visual disturbances     Notify your health care provider if you experience any of the following:  increased confusion or  weakness     Activity as tolerated

## 2022-09-21 NOTE — OP NOTE
"Procedure Note    Procedure Date: 9/21/2022    Procedure Performed:  L5/S1 lumbar interlaminar epidural steroid injection under fluoroscopy.    Indications: Patient has failed conservative therapy.      Pre-op diagnosis: Lumbar Radiculopathy    Post-op diagnosis: same    Physician: Yobani Nieves MD    IV anxiolysis medications: none    Medications injected: depomedrol 80mg, 1% Lidocaine 1ml, 2 mL sterile, preservative-free normal saline.    Local anesthetic used: 1% Lidocaine, 1 ml, 8.4% sodium bicarbonate 0.25ml    Estimated Blood Loss: none    Complications:  none    Technique:  The patient was interviewed in the holding area and Risks/Benefits were discussed, including, but not limited to, the possibility of new or different pain, bleeding or infection.   All questions were answered.  The patient understood and accepted risks.  Consent was verfied.  A time-out was taken to identify patient and procedure prior to starting the procedure. The patient was placed in the prone position on the fluoroscopy table. The area of the lumbar spine was prepped with Chloraprep and draped in a sterile manner. The L5/S1 interspace was identified and marked under AP fluoroscopy. The skin and subcutaneous tissues overlying the targeted interspace were anesthetized with 3-5 mL of 1% lidocaine using a 25G, 1.5" needle.  A 18G 6"Tuohy epidural needle was directed toward the interspace under fluoroscopic guidance until the ligamentum flavum was engaged. From this point, a loss of resistance technique with a glass syringe and saline was used to identify entrance of the needle into the epidural space. Once loss of resistance was observed 1 mL of contrast solution was injected. An appropriate epidurogram was noted.  A 4 mL mixture consisting of saline, 1 mL 1% Lidocaine and 80 mg of depomedrol was injected slowly and without resistance.  The needle was flushed with normal saline and removed. The contrast was seen to be displaced after " injection. Patient was awake/responsive during all injections.  The patient tolerated the procedure well and was transferred to the [facility] in stable condition.  The patient was monitored after the procedure and was given post-procedure and discharge instructions to follow at home. The patient was discharged in a stable condition.

## 2022-09-22 VITALS
HEIGHT: 60 IN | TEMPERATURE: 98 F | DIASTOLIC BLOOD PRESSURE: 67 MMHG | SYSTOLIC BLOOD PRESSURE: 149 MMHG | RESPIRATION RATE: 17 BRPM | WEIGHT: 293 LBS | HEART RATE: 79 BPM | OXYGEN SATURATION: 98 % | BODY MASS INDEX: 57.52 KG/M2

## 2022-10-03 ENCOUNTER — TELEPHONE (OUTPATIENT)
Dept: PAIN MEDICINE | Facility: CLINIC | Age: 67
End: 2022-10-03
Payer: COMMERCIAL

## 2022-10-03 NOTE — TELEPHONE ENCOUNTER
----- Message from Zamzam Keenan sent at 9/30/2022 10:59 AM CDT -----  Contact: Pt  Type: Needs Medical Advice    Who Called:  Pt    Best Call Back Number: [phone]    Additional Information: Please call back regarding pt's appt on 10/7.  Thanks.

## 2022-10-03 NOTE — TELEPHONE ENCOUNTER
2 wk procedure f/u rescheduled for 10-7-22 at 11am with   FER Armas. Pt verbalized understanding.

## 2023-01-04 ENCOUNTER — OFFICE VISIT (OUTPATIENT)
Dept: CARDIOLOGY | Facility: CLINIC | Age: 68
End: 2023-01-04
Payer: COMMERCIAL

## 2023-01-04 VITALS
WEIGHT: 293 LBS | DIASTOLIC BLOOD PRESSURE: 60 MMHG | SYSTOLIC BLOOD PRESSURE: 138 MMHG | BODY MASS INDEX: 57.52 KG/M2 | HEIGHT: 60 IN | HEART RATE: 63 BPM

## 2023-01-04 DIAGNOSIS — I10 PRIMARY HYPERTENSION: Chronic | ICD-10-CM

## 2023-01-04 DIAGNOSIS — R06.02 SOB (SHORTNESS OF BREATH): Primary | ICD-10-CM

## 2023-01-04 DIAGNOSIS — R09.89 BRUIT OF LEFT CAROTID ARTERY: ICD-10-CM

## 2023-01-04 DIAGNOSIS — I27.20 HYPERTENSIVE PULMONARY VASCULAR DISEASE: ICD-10-CM

## 2023-01-04 DIAGNOSIS — E03.9 ACQUIRED HYPOTHYROIDISM: Chronic | ICD-10-CM

## 2023-01-04 DIAGNOSIS — I49.49 PREMATURE BEATS: ICD-10-CM

## 2023-01-04 DIAGNOSIS — E66.01 CLASS 3 SEVERE OBESITY DUE TO EXCESS CALORIES WITH SERIOUS COMORBIDITY AND BODY MASS INDEX (BMI) OF 50.0 TO 59.9 IN ADULT: Chronic | ICD-10-CM

## 2023-01-04 DIAGNOSIS — E78.00 HYPERCHOLESTEROLEMIA: ICD-10-CM

## 2023-01-04 DIAGNOSIS — R07.89 CHEST DISCOMFORT: ICD-10-CM

## 2023-01-04 PROCEDURE — 99204 OFFICE O/P NEW MOD 45 MIN: CPT | Mod: 25,S$GLB,, | Performed by: INTERNAL MEDICINE

## 2023-01-04 PROCEDURE — 93000 ELECTROCARDIOGRAM COMPLETE: CPT | Mod: S$GLB,,, | Performed by: INTERNAL MEDICINE

## 2023-01-04 PROCEDURE — 99204 PR OFFICE/OUTPT VISIT, NEW, LEVL IV, 45-59 MIN: ICD-10-PCS | Mod: 25,S$GLB,, | Performed by: INTERNAL MEDICINE

## 2023-01-04 PROCEDURE — 93000 EKG 12-LEAD: ICD-10-PCS | Mod: S$GLB,,, | Performed by: INTERNAL MEDICINE

## 2023-01-04 RX ORDER — NITROGLYCERIN 0.4 MG/1
0.4 TABLET SUBLINGUAL EVERY 5 MIN PRN
Qty: 25 TABLET | Refills: 0 | Status: SHIPPED | OUTPATIENT
Start: 2023-01-04 | End: 2023-01-30

## 2023-01-04 RX ORDER — MELOXICAM 15 MG/1
15 TABLET ORAL DAILY PRN
COMMUNITY
Start: 2022-12-07 | End: 2023-09-12 | Stop reason: SDUPTHER

## 2023-01-04 RX ORDER — ZOLPIDEM TARTRATE 5 MG/1
5 TABLET ORAL NIGHTLY PRN
COMMUNITY
Start: 2022-11-03 | End: 2023-05-09

## 2023-01-04 RX ORDER — ONDANSETRON 4 MG/1
4 TABLET, ORALLY DISINTEGRATING ORAL EVERY 6 HOURS PRN
COMMUNITY
Start: 2022-12-14

## 2023-01-04 RX ORDER — ALPRAZOLAM 0.25 MG/1
0.25 TABLET ORAL DAILY PRN
COMMUNITY
Start: 2022-11-30 | End: 2023-09-12

## 2023-01-04 RX ORDER — DICLOFENAC SODIUM 50 MG/1
TABLET, DELAYED RELEASE ORAL
COMMUNITY
Start: 2022-12-08 | End: 2023-08-09

## 2023-01-04 NOTE — PROGRESS NOTES
Subjective:    Patient ID:  Safia Ibarra is a 67 y.o. female who presents for Hypertension, Shortness of Breath, and Heartburn        HPI    NEW PATIENT EVALUATION, AVAILABLE RECORDS REVIEWED, LAST LDL 66 HDL 56 TRIGLYCERIDE 62,  PASSED AWAY, SEPSIS--, HAS BEEN HAVING LEG EDEMA, SAW PCP, NEGATIVE FOR DVT, NOW BETTER, CHEST DISCOMFORT WITH WALKING, RECENT CBC , SEE ROS  Past Medical History:   Diagnosis Date    HTN (hypertension)     Hyperlipidemia     Hypothyroidism     Osteoporosis      Past Surgical History:   Procedure Laterality Date    BREAST BIOPSY Left     core Dr. Hannah benign over 5 yrs ago    EPIDURAL STEROID INJECTION INTO LUMBAR SPINE N/A 1/3/2022    Procedure: Injection-steroid-epidural-lumbar L5/S1;  Surgeon: Yobani Nieves MD;  Location: [facility] OR;  Service: Pain Management;  Laterality: N/A;    EPIDURAL STEROID INJECTION INTO LUMBAR SPINE N/A 9/21/2022    Procedure: Injection-steroid-epidural-lumbar L5/S1;  Surgeon: Yobani Nieves MD;  Location: [facility] OR;  Service: Pain Management;  Laterality: N/A;    HYSTERECTOMY       Family History   Problem Relation Age of Onset    Uterine cancer Mother     Breast cancer Paternal Aunt      Social History     Socioeconomic History    Marital status:    Tobacco Use    Smoking status: Never    Smokeless tobacco: Never       Review of patient's allergies indicates:   Allergen Reactions    Bactrim [sulfamethoxazole-trimethoprim] Itching       Current Outpatient Medications:     ALPRAZolam (XANAX) 0.25 MG tablet, Take 0.25 mg by mouth daily as needed., Disp: , Rfl:     amLODIPine (NORVASC) 5 MG tablet, Take 5 mg by mouth once daily., Disp: , Rfl:     diclofenac (VOLTAREN) 50 MG EC tablet, Take by mouth., Disp: , Rfl:     HYDROcodone-acetaminophen (NORCO) 5-325 mg per tablet, Take 1 tablet by mouth every 6 (six) hours as needed for Pain., Disp: , Rfl:     levothyroxine (SYNTHROID, LEVOTHROID) 175 MCG tablet, , Disp: , Rfl:     meloxicam  (MOBIC) 15 MG tablet, Take 15 mg by mouth daily as needed., Disp: , Rfl:     ondansetron (ZOFRAN-ODT) 4 MG TbDL, 4 mg every 6 (six) hours as needed., Disp: , Rfl:     rosuvastatin (CRESTOR) 10 MG tablet, Take 10 mg by mouth nightly., Disp: , Rfl:     tiZANidine (ZANAFLEX) 4 MG tablet, Take 4 mg by mouth every 8 (eight) hours as needed., Disp: , Rfl:     zolpidem (AMBIEN) 5 MG Tab, Take 5 mg by mouth nightly as needed., Disp: , Rfl:     nitroGLYCERIN (NITROSTAT) 0.4 MG SL tablet, Place 1 tablet (0.4 mg total) under the tongue every 5 (five) minutes as needed for Chest pain., Disp: 25 tablet, Rfl: 0    Review of Systems   Constitutional: Negative for chills, diaphoresis, malaise/fatigue and night sweats.   HENT:  Negative for congestion and nosebleeds.    Eyes:  Negative for blurred vision and redness.   Cardiovascular:  Positive for chest pain, dyspnea on exertion and leg swelling. Negative for claudication, cyanosis, irregular heartbeat, near-syncope, orthopnea, palpitations, paroxysmal nocturnal dyspnea and syncope.   Respiratory:  Positive for shortness of breath. Negative for cough, hemoptysis, sleep disturbances due to breathing, sputum production and wheezing.    Endocrine: Negative for cold intolerance, heat intolerance and polyuria.   Hematologic/Lymphatic: Negative for adenopathy. Does not bruise/bleed easily.   Skin:  Negative for color change and itching.   Musculoskeletal:  Positive for arthritis. Negative for back pain and falls.   Gastrointestinal:  Positive for heartburn. Negative for abdominal pain, change in bowel habit, constipation, dysphagia, flatus, hematemesis, jaundice, melena, nausea and vomiting.   Genitourinary:  Negative for dysuria and flank pain.   Neurological:  Negative for brief paralysis, dizziness, focal weakness, light-headedness, loss of balance, numbness, sensory change, tremors and weakness.   Psychiatric/Behavioral:  Negative for altered mental status and depression.     Allergic/Immunologic: Negative for hives and persistent infections.      Objective:      Vitals:    23 1501   BP: 138/60   Pulse: 63   Weight: 134.6 kg (296 lb 11.8 oz)   Height: 5' (1.524 m)   PainSc: 0-No pain     Body mass index is 57.95 kg/m².    Physical Exam  Constitutional:       Appearance: Normal appearance. She is morbidly obese.   HENT:      Head: Normocephalic and atraumatic.   Eyes:      General: No scleral icterus.     Extraocular Movements: Extraocular movements intact.   Neck:      Vascular: Normal carotid pulses. Carotid bruit present.   Cardiovascular:      Rate and Rhythm: Normal rate and regular rhythm. Occasional Extrasystoles are present.     Pulses:           Carotid pulses are 2+ on the right side and 2+ on the left side with bruit.       Radial pulses are 2+ on the right side and 2+ on the left side.      Heart sounds:     No friction rub. No gallop.   Pulmonary:      Effort: Pulmonary effort is normal.      Breath sounds: Normal breath sounds and air entry. No rales.   Abdominal:      Palpations: Abdomen is soft.      Tenderness: There is no abdominal tenderness.   Musculoskeletal:      Cervical back: Neck supple.      Right lower le+ Edema present.      Left lower le+ Edema present.   Skin:     Capillary Refill: Capillary refill takes less than 2 seconds.   Neurological:      General: No focal deficit present.      Mental Status: She is alert and oriented to person, place, and time.   Psychiatric:         Mood and Affect: Mood normal.         Behavior: Behavior normal.               ..    Chemistry    No results found for: NA, K, CL, CO2, BUN, CREATININE, GLU No results found for: CALCIUM, ALKPHOS, AST, ALT, BILITOT, ESTGFRAFRICA, EGFRNONAA         ..No results found for: CHOL  No results found for: HDL  No results found for: LDLCALC  No results found for: TRIG  No results found for: CHOLHDL  ..No results found for: WBC, HGB, HCT, MCV, PLT    Test(s) Reviewed  I have  reviewed the following in detail:  [] Stress test   [] Angiography   [] Echocardiogram   [] Labs   [] Other:       Assessment:         ICD-10-CM ICD-9-CM   1. SOB (shortness of breath)  R06.02 786.05   2. Chest discomfort  R07.89 786.59   3. Bruit of left carotid artery  R09.89 785.9   4. Hypertensive pulmonary vascular disease  I27.20 416.8   5. Primary hypertension  I10 401.9   6. Hypercholesterolemia  E78.00 272.0   7. Acquired hypothyroidism  E03.9 244.9   8. Premature beats  I49.49 427.60   9. Class 3 severe obesity due to excess calories with serious comorbidity and body mass index (BMI) of 50.0 to 59.9 in adult  E66.01 278.01    Z68.43 V85.43     Problem List Items Addressed This Visit          Pulmonary    SOB (shortness of breath) - Primary    Relevant Orders    Echo    Nuclear Stress - Cardiology Interpreted    Hypertensive pulmonary vascular disease    Relevant Orders    IN OFFICE EKG 12-LEAD (to [place]) (Completed)       Cardiac/Vascular    Hypercholesterolemia    Primary hypertension    Premature beats    Relevant Orders    Nuclear Stress - Cardiology Interpreted    Chest discomfort    Relevant Orders    Echo    Nuclear Stress - Cardiology Interpreted    Bruit of left carotid artery    Relevant Orders    CV Ultrasound Bilateral Doppler Carotid       Endocrine    Acquired hypothyroidism    Class 3 severe obesity due to excess calories with serious comorbidity and body mass index (BMI) of 50.0 to 59.9 in adult        Plan:     EKG SR PAC'S, WILL NEED FURTHER EVALUATION CHECK ECHO NUCLEAR STRESS TEST TO ASSESS FOR ISCHEMIA CAROTID ULTRASOUND WILL PRESCRIBE P.R.N. SUBLINGUAL NITROGLYCERIN FOR SYMPTOMS, ASSESS ANGINA NO OVERT HEART FAILURE NO TIA TYPE SYMPTOMS SOME NO NEAR-SYNCOPE ARRHYTHMIA APPEARS TO BE BENIGN, SYMPTOMS PARTIALLY RELATED TO EXCESS WEIGHT, RETURN TO CLINIC IN FEW WEEKS AFTER TESTS       SOB (shortness of breath)  Comments:  EVALUATE  Orders:  -     Echo  -     Nuclear Stress - Cardiology  Interpreted; Future    Chest discomfort  Comments:  EVALUATE  Orders:  -     Echo  -     Nuclear Stress - Cardiology Interpreted; Future    Bruit of left carotid artery  Comments:  CAROTID ULTRASOUND  Orders:  -     CV Ultrasound Bilateral Doppler Carotid; Future    Hypertensive pulmonary vascular disease  -     IN OFFICE EKG 12-LEAD (to [place])    Primary hypertension    Hypercholesterolemia    Acquired hypothyroidism    Premature beats  -     Nuclear Stress - Cardiology Interpreted; Future    Class 3 severe obesity due to excess calories with serious comorbidity and body mass index (BMI) of 50.0 to 59.9 in adult  Comments:  NEEDS SIGNIFICANT WEIGHT LOSS    Other orders  -     nitroGLYCERIN (NITROSTAT) 0.4 MG SL tablet; Place 1 tablet (0.4 mg total) under the tongue every 5 (five) minutes as needed for Chest pain.  Dispense: 25 tablet; Refill: 0    RTC Low level/low impact aerobic exercise 5x's/wk. Heart healthy diet and risk factor modification.    See labs and med orders.    Aerobic exercise 5x's/wk. Heart healthy diet and risk factor modification.    See labs and med orders.

## 2023-01-05 PROBLEM — E66.813 CLASS 3 SEVERE OBESITY DUE TO EXCESS CALORIES WITH SERIOUS COMORBIDITY AND BODY MASS INDEX (BMI) OF 50.0 TO 59.9 IN ADULT: Status: ACTIVE | Noted: 2023-01-05

## 2023-01-05 PROBLEM — E66.01 CLASS 3 SEVERE OBESITY DUE TO EXCESS CALORIES WITH SERIOUS COMORBIDITY AND BODY MASS INDEX (BMI) OF 50.0 TO 59.9 IN ADULT: Status: ACTIVE | Noted: 2023-01-05

## 2023-01-19 ENCOUNTER — CLINICAL SUPPORT (OUTPATIENT)
Dept: CARDIOLOGY | Facility: CLINIC | Age: 68
End: 2023-01-19
Attending: INTERNAL MEDICINE
Payer: COMMERCIAL

## 2023-01-19 DIAGNOSIS — R09.89 BRUIT OF LEFT CAROTID ARTERY: ICD-10-CM

## 2023-01-19 LAB
LEFT ARM DIASTOLIC BLOOD PRESSURE: 60 MMHG
LEFT ARM SYSTOLIC BLOOD PRESSURE: 138 MMHG
LEFT CBA DIAS: 8 CM/S
LEFT CBA SYS: 53 CM/S
LEFT CCA DIST DIAS: 18 CM/S
LEFT CCA DIST SYS: 70 CM/S
LEFT CCA MID DIAS: 20 CM/S
LEFT CCA MID SYS: 75 CM/S
LEFT CCA PROX DIAS: 21 CM/S
LEFT CCA PROX SYS: 83 CM/S
LEFT ECA DIAS: 16 CM/S
LEFT ECA SYS: 73 CM/S
LEFT ICA DIST DIAS: 35 CM/S
LEFT ICA DIST SYS: 88 CM/S
LEFT ICA MID DIAS: 29 CM/S
LEFT ICA MID SYS: 91 CM/S
LEFT ICA PROX DIAS: 25 CM/S
LEFT ICA PROX SYS: 68 CM/S
LEFT VERTEBRAL DIAS: 13 CM/S
LEFT VERTEBRAL SYS: 45 CM/S
OHS CV CAROTID RIGHT ICA EDV HIGHEST: 26
OHS CV CAROTID ULTRASOUND LEFT ICA/CCA RATIO: 1.3
OHS CV CAROTID ULTRASOUND RIGHT ICA/CCA RATIO: 0.96
OHS CV PV CAROTID LEFT HIGHEST CCA: 83
OHS CV PV CAROTID LEFT HIGHEST ICA: 91
OHS CV PV CAROTID RIGHT HIGHEST CCA: 80
OHS CV PV CAROTID RIGHT HIGHEST ICA: 77
OHS CV US CAROTID LEFT HIGHEST EDV: 35
RIGHT ARM DIASTOLIC BLOOD PRESSURE: 60 MMHG
RIGHT ARM SYSTOLIC BLOOD PRESSURE: 138 MMHG
RIGHT CBA DIAS: 19 CM/S
RIGHT CBA SYS: 80 CM/S
RIGHT CCA DIST DIAS: 18 CM/S
RIGHT CCA DIST SYS: 80 CM/S
RIGHT CCA MID DIAS: 21 CM/S
RIGHT CCA MID SYS: 79 CM/S
RIGHT CCA PROX DIAS: 16 CM/S
RIGHT CCA PROX SYS: 59 CM/S
RIGHT ECA DIAS: 11 CM/S
RIGHT ECA SYS: 61 CM/S
RIGHT ICA DIST DIAS: 26 CM/S
RIGHT ICA DIST SYS: 74 CM/S
RIGHT ICA MID DIAS: 15 CM/S
RIGHT ICA MID SYS: 71 CM/S
RIGHT ICA PROX DIAS: 12 CM/S
RIGHT ICA PROX SYS: 77 CM/S
RIGHT VERTEBRAL DIAS: 9 CM/S
RIGHT VERTEBRAL SYS: 30 CM/S

## 2023-01-19 PROCEDURE — 93880 CV US DOPPLER CAROTID (CUPID ONLY): ICD-10-PCS | Mod: S$GLB,,, | Performed by: INTERNAL MEDICINE

## 2023-01-19 PROCEDURE — 93880 EXTRACRANIAL BILAT STUDY: CPT | Mod: S$GLB,,, | Performed by: INTERNAL MEDICINE

## 2023-01-24 ENCOUNTER — TELEPHONE (OUTPATIENT)
Dept: CARDIOLOGY | Facility: CLINIC | Age: 68
End: 2023-01-24
Payer: MEDICARE

## 2023-01-24 NOTE — TELEPHONE ENCOUNTER
----- Message from Stef Boykin sent at 1/24/2023  2:48 PM CST -----      Name of Who is Calling:PT          What is the request in detail:PT is requesting a call back to discuss if her upcoming test has been authorized by her insurance, she stated someone was going to call her to let her know but she never heard back. Please be Advised!          Can the clinic reply by GEORGEGARRY:no          What Number to Call Back if not in [phone]

## 2023-02-09 ENCOUNTER — TELEPHONE (OUTPATIENT)
Dept: CARDIOLOGY | Facility: CLINIC | Age: 68
End: 2023-02-09
Payer: MEDICARE

## 2023-02-09 NOTE — TELEPHONE ENCOUNTER
----- Message from Shannon Major sent at 2/9/2023 12:57 PM CST -----  Contact: Patient  Type:  Sooner Appointment Request    Caller is requesting a sooner appointment.  Caller declined first available appointment listed below.  Caller will not accept being placed on the waitlist and is requesting a message be sent to doctor.    Name of Caller:  Patient  When is the first available appointment?    Symptoms:  orders  Best Call Back Number:  [phone]    Additional Information:  Patient needs her orders reschedule that were on the 2/20/23. She would like it to be schedule in March. Thanks!

## 2023-02-23 ENCOUNTER — CLINICAL SUPPORT (OUTPATIENT)
Dept: CARDIOLOGY | Facility: CLINIC | Age: 68
End: 2023-02-23
Attending: INTERNAL MEDICINE
Payer: COMMERCIAL

## 2023-02-23 VITALS — BODY MASS INDEX: 57.52 KG/M2 | HEART RATE: 78 BPM | WEIGHT: 293 LBS | HEIGHT: 60 IN

## 2023-02-23 LAB
ASCENDING AORTA: 2.58 CM
AV INDEX (PROSTH): 0.54
AV MEAN GRADIENT: 13 MMHG
AV PEAK GRADIENT: 22 MMHG
AV VALVE AREA: 1.61 CM2
AV VELOCITY RATIO: 0.5
BSA FOR ECHO PROCEDURE: 2.38 M2
CV ECHO LV RWT: 0.53 CM
DOP CALC AO PEAK VEL: 2.37 M/S
DOP CALC AO VTI: 54 CM
DOP CALC LVOT AREA: 3 CM2
DOP CALC LVOT DIAMETER: 1.95 CM
DOP CALC LVOT PEAK VEL: 1.18 M/S
DOP CALC LVOT STROKE VOLUME: 86.86 CM3
DOP CALCLVOT PEAK VEL VTI: 29.1 CM
E WAVE DECELERATION TIME: 189.5 MSEC
E/A RATIO: 1
E/E' RATIO: 12.82 M/S
ECHO LV POSTERIOR WALL: 1.11 CM (ref 0.6–1.1)
EJECTION FRACTION: 55 %
FRACTIONAL SHORTENING: 27 % (ref 28–44)
INTERVENTRICULAR SEPTUM: 1.21 CM (ref 0.6–1.1)
IVRT: 70.07 MSEC
LA MAJOR: 6.17 CM
LA MINOR: 6.06 CM
LA WIDTH: 4 CM
LEFT ATRIUM SIZE: 4.03 CM
LEFT ATRIUM VOLUME INDEX: 37.9 ML/M2
LEFT ATRIUM VOLUME: 83.78 CM3
LEFT INTERNAL DIMENSION IN SYSTOLE: 3.03 CM (ref 2.1–4)
LEFT VENTRICLE DIASTOLIC VOLUME INDEX: 34.84 ML/M2
LEFT VENTRICLE DIASTOLIC VOLUME: 77 ML
LEFT VENTRICLE MASS INDEX: 76 G/M2
LEFT VENTRICLE SYSTOLIC VOLUME INDEX: 17.6 ML/M2
LEFT VENTRICLE SYSTOLIC VOLUME: 38.87 ML
LEFT VENTRICULAR INTERNAL DIMENSION IN DIASTOLE: 4.16 CM (ref 3.5–6)
LEFT VENTRICULAR MASS: 167.09 G
LV LATERAL E/E' RATIO: 9.91 M/S
LV SEPTAL E/E' RATIO: 18.17 M/S
LVOT MG: 3.66 MMHG
LVOT MV: 0.93 CM/S
MV PEAK A VEL: 1.09 M/S
MV PEAK E VEL: 1.09 M/S
MV STENOSIS PRESSURE HALF TIME: 54.96 MS
MV VALVE AREA P 1/2 METHOD: 4 CM2
PISA MRMAX VEL: 4.66 M/S
PISA TR MAX VEL: 2.9 M/S
PULM VEIN S/D RATIO: 0.96
PV PEAK D VEL: 0.55 M/S
PV PEAK S VEL: 0.53 M/S
RA MAJOR: 4.62 CM
RA PRESSURE: 3 MMHG
RA WIDTH: 2.85 CM
RIGHT VENTRICULAR END-DIASTOLIC DIMENSION: 2.78 CM
RIGHT VENTRICULAR LENGTH IN DIASTOLE (APICAL 4-CHAMBER VIEW): 4.56 CM
RV MID DIAMA: 2.22 CM
RV TISSUE DOPPLER FREE WALL SYSTOLIC VELOCITY 1 (APICAL 4 CHAMBER VIEW): 0.02 CM/S
SINUS: 2.91 CM
STJ: 2.24 CM
TDI LATERAL: 0.11 M/S
TDI SEPTAL: 0.06 M/S
TDI: 0.09 M/S
TR MAX PG: 34 MMHG
TRICUSPID ANNULAR PLANE SYSTOLIC EXCURSION: 3.19 CM
TV REST PULMONARY ARTERY PRESSURE: 37 MMHG

## 2023-03-14 ENCOUNTER — HOSPITAL ENCOUNTER (OUTPATIENT)
Dept: RADIOLOGY | Facility: HOSPITAL | Age: 68
Discharge: HOME | End: 2023-03-14
Attending: INTERNAL MEDICINE
Payer: COMMERCIAL

## 2023-03-14 ENCOUNTER — CLINICAL SUPPORT (OUTPATIENT)
Dept: CARDIOLOGY | Facility: HOSPITAL | Age: 68
End: 2023-03-14
Attending: INTERNAL MEDICINE
Payer: COMMERCIAL

## 2023-03-14 VITALS — BODY MASS INDEX: 57.52 KG/M2 | WEIGHT: 293 LBS | HEIGHT: 60 IN

## 2023-03-14 DIAGNOSIS — R07.89 CHEST DISCOMFORT: ICD-10-CM

## 2023-03-14 DIAGNOSIS — R06.02 SOB (SHORTNESS OF BREATH): ICD-10-CM

## 2023-03-14 DIAGNOSIS — I49.49 PREMATURE BEATS: ICD-10-CM

## 2023-03-14 LAB
CV PHARM DOSE: 0.4 MG
CV STRESS BASE HR: 68 BPM
DIASTOLIC BLOOD PRESSURE: 77 MMHG
NUC REST EJECTION FRACTION: 71
OHS CV CPX 1 MINUTE RECOVERY HEART RATE: 85 BPM
OHS CV CPX 85 PERCENT MAX PREDICTED HEART RATE MALE: 126
OHS CV CPX MAX PREDICTED HEART RATE: 148
OHS CV CPX PATIENT IS FEMALE: 1
OHS CV CPX PATIENT IS MALE: 0
OHS CV CPX PEAK DIASTOLIC BLOOD PRESSURE: 77 MMHG
OHS CV CPX PEAK HEAR RATE: 90 BPM
OHS CV CPX PEAK RATE PRESSURE PRODUCT: NORMAL
OHS CV CPX PEAK SYSTOLIC BLOOD PRESSURE: 181 MMHG
OHS CV CPX PERCENT MAX PREDICTED HEART RATE ACHIEVED: 61
OHS CV CPX RATE PRESSURE PRODUCT PRESENTING: NORMAL
OHS CV PHARM TIME: 1341 MIN
SYSTOLIC BLOOD PRESSURE: 181 MMHG

## 2023-03-14 PROCEDURE — 93016 CV STRESS TEST SUPVJ ONLY: CPT | Mod: ,,, | Performed by: INTERNAL MEDICINE

## 2023-03-14 PROCEDURE — 93016 NUCLEAR STRESS - CARDIOLOGY INTERPRETED (CUPID ONLY): ICD-10-PCS | Mod: ,,, | Performed by: INTERNAL MEDICINE

## 2023-03-14 PROCEDURE — 63600175 PHARM REV CODE 636 W HCPCS: Mod: PO | Performed by: INTERNAL MEDICINE

## 2023-03-14 PROCEDURE — 78452 NUCLEAR STRESS - CARDIOLOGY INTERPRETED (CUPID ONLY): ICD-10-PCS | Mod: 26,,, | Performed by: INTERNAL MEDICINE

## 2023-03-14 PROCEDURE — 93018 PR CARDIAC STRESS TST,INTERP/REPT ONLY: ICD-10-PCS | Mod: ,,, | Performed by: INTERNAL MEDICINE

## 2023-03-14 PROCEDURE — 78452 HT MUSCLE IMAGE SPECT MULT: CPT | Mod: 26,,, | Performed by: INTERNAL MEDICINE

## 2023-03-14 PROCEDURE — 78452 HT MUSCLE IMAGE SPECT MULT: CPT | Mod: PO

## 2023-03-14 PROCEDURE — 93017 CV STRESS TEST TRACING ONLY: CPT | Mod: PO

## 2023-03-14 PROCEDURE — 93018 CV STRESS TEST I&R ONLY: CPT | Mod: ,,, | Performed by: INTERNAL MEDICINE

## 2023-03-14 PROCEDURE — A9502 TC99M TETROFOSMIN: HCPCS | Mod: PO

## 2023-03-14 RX ORDER — REGADENOSON 0.08 MG/ML
0.4 INJECTION, SOLUTION INTRAVENOUS
Status: COMPLETED | OUTPATIENT
Start: 2023-03-14 | End: 2023-03-14

## 2023-03-14 RX ADMIN — REGADENOSON 0.4 MG: 0.08 INJECTION, SOLUTION INTRAVENOUS at 01:03

## 2023-04-11 ENCOUNTER — OFFICE VISIT (OUTPATIENT)
Dept: PAIN MEDICINE | Facility: CLINIC | Age: 68
End: 2023-04-11
Payer: COMMERCIAL

## 2023-04-11 ENCOUNTER — LAB VISIT (OUTPATIENT)
Dept: LAB | Facility: HOSPITAL | Age: 68
End: 2023-04-11
Attending: ANESTHESIOLOGY
Payer: COMMERCIAL

## 2023-04-11 VITALS
HEART RATE: 73 BPM | BODY MASS INDEX: 56.57 KG/M2 | WEIGHT: 288.13 LBS | HEIGHT: 60 IN | SYSTOLIC BLOOD PRESSURE: 140 MMHG | DIASTOLIC BLOOD PRESSURE: 65 MMHG

## 2023-04-11 DIAGNOSIS — Z01.818 PRE-OP TESTING: ICD-10-CM

## 2023-04-11 DIAGNOSIS — M54.16 LUMBAR RADICULOPATHY: Primary | ICD-10-CM

## 2023-04-11 LAB
ERYTHROCYTE [DISTWIDTH] IN BLOOD BY AUTOMATED COUNT: 15.6 % (ref 11.5–14.5)
HCT VFR BLD AUTO: 33.9 % (ref 37–48.5)
HGB BLD-MCNC: 10.2 G/DL (ref 12–16)
MCH RBC QN AUTO: 25.8 PG (ref 27–31)
MCHC RBC AUTO-ENTMCNC: 30.1 G/DL (ref 32–36)
MCV RBC AUTO: 86 FL (ref 82–98)
PLATELET # BLD AUTO: 141 K/UL (ref 150–450)
PMV BLD AUTO: 10.1 FL (ref 9.2–12.9)
RBC # BLD AUTO: 3.96 M/UL (ref 4–5.4)
WBC # BLD AUTO: 6.13 K/UL (ref 3.9–12.7)

## 2023-04-11 PROCEDURE — 3078F PR MOST RECENT DIASTOLIC BLOOD PRESSURE < 80 MM HG: ICD-10-PCS | Mod: CPTII,S$GLB,, | Performed by: ANESTHESIOLOGY

## 2023-04-11 PROCEDURE — 1101F PR PT FALLS ASSESS DOC 0-1 FALLS W/OUT INJ PAST YR: ICD-10-PCS | Mod: CPTII,S$GLB,, | Performed by: ANESTHESIOLOGY

## 2023-04-11 PROCEDURE — 3288F PR FALLS RISK ASSESSMENT DOCUMENTED: ICD-10-PCS | Mod: CPTII,S$GLB,, | Performed by: ANESTHESIOLOGY

## 2023-04-11 PROCEDURE — 1125F AMNT PAIN NOTED PAIN PRSNT: CPT | Mod: CPTII,S$GLB,, | Performed by: ANESTHESIOLOGY

## 2023-04-11 PROCEDURE — 1125F PR PAIN SEVERITY QUANTIFIED, PAIN PRESENT: ICD-10-PCS | Mod: CPTII,S$GLB,, | Performed by: ANESTHESIOLOGY

## 2023-04-11 PROCEDURE — 3078F DIAST BP <80 MM HG: CPT | Mod: CPTII,S$GLB,, | Performed by: ANESTHESIOLOGY

## 2023-04-11 PROCEDURE — 3008F BODY MASS INDEX DOCD: CPT | Mod: CPTII,S$GLB,, | Performed by: ANESTHESIOLOGY

## 2023-04-11 PROCEDURE — 99999 PR PBB SHADOW E&M-EST. PATIENT-LVL V: ICD-10-PCS | Mod: PBBFAC,,, | Performed by: ANESTHESIOLOGY

## 2023-04-11 PROCEDURE — 85027 COMPLETE CBC AUTOMATED: CPT | Performed by: ANESTHESIOLOGY

## 2023-04-11 PROCEDURE — 99999 PR PBB SHADOW E&M-EST. PATIENT-LVL V: CPT | Mod: PBBFAC,,, | Performed by: ANESTHESIOLOGY

## 2023-04-11 PROCEDURE — 3077F PR MOST RECENT SYSTOLIC BLOOD PRESSURE >= 140 MM HG: ICD-10-PCS | Mod: CPTII,S$GLB,, | Performed by: ANESTHESIOLOGY

## 2023-04-11 PROCEDURE — 36415 COLL VENOUS BLD VENIPUNCTURE: CPT | Mod: PO | Performed by: ANESTHESIOLOGY

## 2023-04-11 PROCEDURE — 1159F MED LIST DOCD IN RCRD: CPT | Mod: CPTII,S$GLB,, | Performed by: ANESTHESIOLOGY

## 2023-04-11 PROCEDURE — 99214 PR OFFICE/OUTPT VISIT, EST, LEVL IV, 30-39 MIN: ICD-10-PCS | Mod: S$GLB,,, | Performed by: ANESTHESIOLOGY

## 2023-04-11 PROCEDURE — 99214 OFFICE O/P EST MOD 30 MIN: CPT | Mod: S$GLB,,, | Performed by: ANESTHESIOLOGY

## 2023-04-11 PROCEDURE — 3008F PR BODY MASS INDEX (BMI) DOCUMENTED: ICD-10-PCS | Mod: CPTII,S$GLB,, | Performed by: ANESTHESIOLOGY

## 2023-04-11 PROCEDURE — 3077F SYST BP >= 140 MM HG: CPT | Mod: CPTII,S$GLB,, | Performed by: ANESTHESIOLOGY

## 2023-04-11 PROCEDURE — 1101F PT FALLS ASSESS-DOCD LE1/YR: CPT | Mod: CPTII,S$GLB,, | Performed by: ANESTHESIOLOGY

## 2023-04-11 PROCEDURE — 3288F FALL RISK ASSESSMENT DOCD: CPT | Mod: CPTII,S$GLB,, | Performed by: ANESTHESIOLOGY

## 2023-04-11 PROCEDURE — 1159F PR MEDICATION LIST DOCUMENTED IN MEDICAL RECORD: ICD-10-PCS | Mod: CPTII,S$GLB,, | Performed by: ANESTHESIOLOGY

## 2023-04-11 RX ORDER — ALPRAZOLAM 0.5 MG/1
0.5 TABLET, ORALLY DISINTEGRATING ORAL ONCE AS NEEDED
Status: CANCELLED | OUTPATIENT
Start: 2023-04-11 | End: 2034-09-07

## 2023-04-11 RX ORDER — FESOTERODINE FUMARATE 4 MG/1
4 TABLET, EXTENDED RELEASE ORAL NIGHTLY
COMMUNITY
Start: 2023-03-09 | End: 2023-08-09

## 2023-04-11 RX ORDER — MINOCYCLINE HYDROCHLORIDE 100 MG/1
100 CAPSULE ORAL 2 TIMES DAILY
COMMUNITY
Start: 2022-12-01 | End: 2023-05-09

## 2023-04-11 RX ORDER — NYSTATIN 100000 U/G
CREAM TOPICAL 3 TIMES DAILY
COMMUNITY
Start: 2023-03-20

## 2023-04-11 NOTE — PROGRESS NOTES
[facility] Follow Up Evaluation    Referred by: Dr. Singletary  Reason for referral: back pain    CC:   Chief Complaint   Patient presents with    Follow-up      Last 3 PDI Scores 9/9/2022   Pain Disability Index (PDI) 41       Interval HPI 9/9/22:  Ms. Ibarra returns the office today for follow-up.  Return of her typical lower back pain with radiating pain down bilateral legs.  Pain is worse with standing and walking relieved with rest.  She denies any new numbness or weakness.    Pain intervention history:  - s/p L5-S1 NATASHA on 1/3/22 with 80% relief   - s/p L5-S1 NATASHA on 9/21/22 with 70% relief lasting for over 5 months    HPI:   Safia Ibarra is a 67 y.o. female who complains of back pain    Onset: 6-7 months  Inciting Event: none  Progression: since onset, pain is gradually worsening  Current Pain Score: 6/10  Typical Range: 2-10/10  Timing: constant  Quality: shooting  Radiation: yes, down the back both legs  Associated numbness or weakness: yes numbness   Exacerbated by: walking  Allievated by: laying down  Is Pain Level Acceptable?: No    Previous Therapies:  PT/OT: yes  HEP: yes  Interventions:   Surgery:  Medications:   - NSAIDS:   - MSK Relaxants:   - TCAs:   - SNRIs:   - Topicals:   - Anticonvulsants:  - Opioids:     History:    Current Outpatient Medications:     ALPRAZolam (XANAX) 0.25 MG tablet, Take 0.25 mg by mouth daily as needed., Disp: , Rfl:     amLODIPine (NORVASC) 5 MG tablet, Take 5 mg by mouth once daily., Disp: , Rfl:     diclofenac (VOLTAREN) 50 MG EC tablet, Take by mouth., Disp: , Rfl:     fesoterodine (TOVIAZ) 4 mg Tb24, Take 4 mg by mouth every evening., Disp: , Rfl:     HYDROcodone-acetaminophen (NORCO) 5-325 mg per tablet, Take 1 tablet by mouth every 6 (six) hours as needed for Pain., Disp: , Rfl:     levothyroxine (SYNTHROID, LEVOTHROID) 175 MCG tablet, , Disp: , Rfl:     meloxicam (MOBIC) 15 MG tablet, Take 15 mg by mouth daily as needed., Disp: , Rfl:     minocycline  (MINOCIN,DYNACIN) 100 MG capsule, Take 100 mg by mouth 2 (two) times daily., Disp: , Rfl:     nitroGLYCERIN (NITROSTAT) 0.4 MG SL tablet, For CHEST PAIN: Put 1 tablet under tongue every 5 minutes for 3 doses as needed. If no relief, GO TO ER., Disp: 25 tablet, Rfl: 0    nystatin (MYCOSTATIN) cream, 3 (three) times daily. Apply to affected area, Disp: , Rfl:     ondansetron (ZOFRAN-ODT) 4 MG TbDL, 4 mg every 6 (six) hours as needed., Disp: , Rfl:     rosuvastatin (CRESTOR) 10 MG tablet, Take 10 mg by mouth nightly., Disp: , Rfl:     tiZANidine (ZANAFLEX) 4 MG tablet, Take 4 mg by mouth every 8 (eight) hours as needed., Disp: , Rfl:     zolpidem (AMBIEN) 5 MG Tab, Take 5 mg by mouth nightly as needed., Disp: , Rfl:     Past Medical History:   Diagnosis Date    HTN (hypertension)     Hyperlipidemia     Hypothyroidism     Osteoporosis        Past Surgical History:   Procedure Laterality Date    BREAST BIOPSY Left     core Dr. Hannah benign over 5 yrs ago    EPIDURAL STEROID INJECTION INTO LUMBAR SPINE N/A 1/3/2022    Procedure: Injection-steroid-epidural-lumbar L5/S1;  Surgeon: Yobani Nieves MD;  Location: [facility] OR;  Service: Pain Management;  Laterality: N/A;    EPIDURAL STEROID INJECTION INTO LUMBAR SPINE N/A 9/21/2022    Procedure: Injection-steroid-epidural-lumbar L5/S1;  Surgeon: Yobani Nieves MD;  Location: [facility] OR;  Service: Pain Management;  Laterality: N/A;    HYSTERECTOMY         Family History   Problem Relation Age of Onset    Uterine cancer Mother     Breast cancer Paternal Aunt        Social History     Socioeconomic History    Marital status:    Tobacco Use    Smoking status: Never    Smokeless tobacco: Never       Review of patient's allergies indicates:   Allergen Reactions    Bactrim [sulfamethoxazole-trimethoprim] Itching       Review of Systems:  General ROS: negative for - fever  Psychological ROS: negative for - hostility  Hematological and Lymphatic ROS: negative for - bleeding  problems  Endocrine ROS: negative for - unexpected weight changes  Respiratory ROS: no cough, shortness of breath, or wheezing  Cardiovascular ROS: no chest pain or dyspnea on exertion  Gastrointestinal ROS: no abdominal pain, change in bowel habits, or black or bloody stools  Musculoskeletal ROS: negative for - muscular weakness  Neurological ROS: positive for - numbness/tingling  Dermatological ROS: negative for rash    Physical Exam:  Vitals:    04/11/23 1021   BP: (!) 140/65   Pulse: 73   Weight: 130.7 kg (288 lb 2.3 oz)   Height: 5' (1.524 m)   PainSc:   7   PainLoc: Back     Body mass index is 56.27 kg/m².     Gen: NAD  Gait: gait intact  Psych:  Mood appropriate for given condition  HEENT: eyes anicteric   GI: Abd soft  CV: RRR  Lungs: breathing unlabored   ROM: limited AROM of the L spine in all planes, full ROM at ankles, knees and hips  Lumbar flexion 90 degrees, extension 50 degrees, side bending 30 degrees.    Sensation: intact to light touch in all dermatomes tested from L2-S1 bilaterally  Reflexes: 2+ b/l patella and 0/0 b/l achilles  Palpation: Diffusely tender over lumbar paraspinals   Tone: normal in the b/l knees and hips   Skin: intact  Extremities: No edema in b/l ankles or hands  Provacative tests:        Right Left   L2/3 Iliacus Hip flexion  5  5   L3/4 Qudratus Femoris Knee Extension  5  5   L4/5 Tib Anterior Ankle Dorsiflexion   5  5   L5/S1 Extensor Hallicus Longus Great toe extension  5  5                 S1/S2 Gastroc/Soleus Plantar Flexion  5  5       Imaging:  MRI lumbar spine 11/15/21  FINDINGS  There is a minor curvature in the lumbar region convex to the patient's left.  There are hypertrophic facet changes with facet arthropathy throughout the lumbar spine.  There is disc space narrowing primarily at L1-L2, L2-L3 and L3-L4.  There is generally stenotic changes in part on a developmental basis throughout the lumbosacral spine with other factors present as well.  The distal cord  terminates at L1-L2 and appears unremarkable.    At L1-L2, there is mild to moderate lumbar stenosis present.  Hypertrophic facet ligamentous changes as well as a broad-based disc protrusion measured in the AP dimension up to 4.8 mm.  There is mild foraminal restriction bilaterally without root contact.     At L2-L3, there is moderate lumbar stenosis present more prominently paracentrally to the right where there is a prominent disc protrusion/extrusion measured up to 6.6 mm likely compressing the traversing nerve root on the right.  There is mild foraminal restriction bilaterally without root contact within the neural foramina.    At L3-L4, there is moderate lumbar stenosis produced by broad based disc protrusion measured up to 5.4 mm in the AP dimension.  Hypertrophic facet ligamentous changes contribute to the lumbar stenosis.  There is foraminal restriction bilaterally more so on the right where there does appear to be contact of the exiting nerve root in the right foramen.    At L4-L5,  there is moderate to marked lumbar stenosis produced by hypertrophic facet and hypertrophic ligamentous changes.  There is a broad-based disc protrusion measured up to 3.9 mm in the AP dimension.  There is foraminal restriction bilaterally without definite root contact within the neural foramina.    At L5-S1,  there is a right paracentral disc protrusion measured up to 3.6 mm which may contact the traversing nerve root on the right.  There is no significant central stenosis.  There is foraminal restriction bilaterally.  There does appear to be contact of the exiting nerve root along its inferior margin in the left foramen.     Labs:  BMP  No results found for: NA, K, CL, CO2, BUN, CREATININE, CALCIUM, ANIONGAP, ESTGFRAFRICA, EGFRNONAA  No results found for: ALT, AST, GGT, ALKPHOS, BILITOT    Assessment:   Problem List Items Addressed This Visit    None  Visit Diagnoses       Lumbar radiculopathy    -  Primary    Relevant Orders     Case Request Operating Room: Injection-steroid-epidural-lumbar L5/S1 (Completed)    Pre-op testing        Relevant Orders    CBC Without Differential            67 y.o. year old female presents to the office with back pain, constant, 6/10, radiating down the back of both legs.  + numbness, no weakness.    4/11/23 - Ms. Ibarra returns the office today for follow-up.  Return of her typical lower back pain with radiating pain down bilateral legs.  Pain is worse with standing and walking relieved with rest.  She denies any new numbness or weakness.    - on exam she continues to have full strength and intact sensation to light touch  - lumbar MRI consistent with severe central canal stenosis at L4-5  - she has previously seen Neurosurgery who referred her for conservative treatment options  - relief with conservative options over the past 6 months including PT directed home exercises and NSAIDs  - she is status post L5-S1 NATASHA on 09/21/2022 with 70% relief lasting for over 5 months  - her typical pain and claudication symptoms have returned.  Her pain is limiting her mobility and interfering with the quality of her life  - we will schedule for repeat L5-S1 NATASHA.  The risks and benefits of this intervention, and alternative therapies were discussed with the patient.  The discussion of risks included infection, bleeding, need for additional procedures or surgery, nerve damage.  Questions regarding the procedure, risks, expected outcome, and possible side effects were solicited and answered to the patient's satisfaction.  Safia Ibarra wishes to proceed with the injection or procedure.  Written consent was obtained.  - follow-up 2-3 weeks post injection.  If she fails to get relief then we will consider getting an updated lumbar MRI and consider neurosurgery referral    : Not applicable    Yobani Nieves M.D.  Interventional Pain Medicine / Anesthesiology

## 2023-04-11 NOTE — H&P (VIEW-ONLY)
[facility] Follow Up Evaluation    Referred by: Dr. Singletary  Reason for referral: back pain    CC:   Chief Complaint   Patient presents with    Follow-up      Last 3 PDI Scores 9/9/2022   Pain Disability Index (PDI) 41       Interval HPI 9/9/22:  Ms. Ibarra returns the office today for follow-up.  Return of her typical lower back pain with radiating pain down bilateral legs.  Pain is worse with standing and walking relieved with rest.  She denies any new numbness or weakness.    Pain intervention history:  - s/p L5-S1 NATASHA on 1/3/22 with 80% relief   - s/p L5-S1 NATASHA on 9/21/22 with 70% relief lasting for over 5 months    HPI:   Safia Ibarra is a 67 y.o. female who complains of back pain    Onset: 6-7 months  Inciting Event: none  Progression: since onset, pain is gradually worsening  Current Pain Score: 6/10  Typical Range: 2-10/10  Timing: constant  Quality: shooting  Radiation: yes, down the back both legs  Associated numbness or weakness: yes numbness   Exacerbated by: walking  Allievated by: laying down  Is Pain Level Acceptable?: No    Previous Therapies:  PT/OT: yes  HEP: yes  Interventions:   Surgery:  Medications:   - NSAIDS:   - MSK Relaxants:   - TCAs:   - SNRIs:   - Topicals:   - Anticonvulsants:  - Opioids:     History:    Current Outpatient Medications:     ALPRAZolam (XANAX) 0.25 MG tablet, Take 0.25 mg by mouth daily as needed., Disp: , Rfl:     amLODIPine (NORVASC) 5 MG tablet, Take 5 mg by mouth once daily., Disp: , Rfl:     diclofenac (VOLTAREN) 50 MG EC tablet, Take by mouth., Disp: , Rfl:     fesoterodine (TOVIAZ) 4 mg Tb24, Take 4 mg by mouth every evening., Disp: , Rfl:     HYDROcodone-acetaminophen (NORCO) 5-325 mg per tablet, Take 1 tablet by mouth every 6 (six) hours as needed for Pain., Disp: , Rfl:     levothyroxine (SYNTHROID, LEVOTHROID) 175 MCG tablet, , Disp: , Rfl:     meloxicam (MOBIC) 15 MG tablet, Take 15 mg by mouth daily as needed., Disp: , Rfl:     minocycline  (MINOCIN,DYNACIN) 100 MG capsule, Take 100 mg by mouth 2 (two) times daily., Disp: , Rfl:     nitroGLYCERIN (NITROSTAT) 0.4 MG SL tablet, For CHEST PAIN: Put 1 tablet under tongue every 5 minutes for 3 doses as needed. If no relief, GO TO ER., Disp: 25 tablet, Rfl: 0    nystatin (MYCOSTATIN) cream, 3 (three) times daily. Apply to affected area, Disp: , Rfl:     ondansetron (ZOFRAN-ODT) 4 MG TbDL, 4 mg every 6 (six) hours as needed., Disp: , Rfl:     rosuvastatin (CRESTOR) 10 MG tablet, Take 10 mg by mouth nightly., Disp: , Rfl:     tiZANidine (ZANAFLEX) 4 MG tablet, Take 4 mg by mouth every 8 (eight) hours as needed., Disp: , Rfl:     zolpidem (AMBIEN) 5 MG Tab, Take 5 mg by mouth nightly as needed., Disp: , Rfl:     Past Medical History:   Diagnosis Date    HTN (hypertension)     Hyperlipidemia     Hypothyroidism     Osteoporosis        Past Surgical History:   Procedure Laterality Date    BREAST BIOPSY Left     core Dr. Hannah benign over 5 yrs ago    EPIDURAL STEROID INJECTION INTO LUMBAR SPINE N/A 1/3/2022    Procedure: Injection-steroid-epidural-lumbar L5/S1;  Surgeon: Yobani Nieves MD;  Location: [facility] OR;  Service: Pain Management;  Laterality: N/A;    EPIDURAL STEROID INJECTION INTO LUMBAR SPINE N/A 9/21/2022    Procedure: Injection-steroid-epidural-lumbar L5/S1;  Surgeon: Yobani Nieves MD;  Location: [facility] OR;  Service: Pain Management;  Laterality: N/A;    HYSTERECTOMY         Family History   Problem Relation Age of Onset    Uterine cancer Mother     Breast cancer Paternal Aunt        Social History     Socioeconomic History    Marital status:    Tobacco Use    Smoking status: Never    Smokeless tobacco: Never       Review of patient's allergies indicates:   Allergen Reactions    Bactrim [sulfamethoxazole-trimethoprim] Itching       Review of Systems:  General ROS: negative for - fever  Psychological ROS: negative for - hostility  Hematological and Lymphatic ROS: negative for - bleeding  problems  Endocrine ROS: negative for - unexpected weight changes  Respiratory ROS: no cough, shortness of breath, or wheezing  Cardiovascular ROS: no chest pain or dyspnea on exertion  Gastrointestinal ROS: no abdominal pain, change in bowel habits, or black or bloody stools  Musculoskeletal ROS: negative for - muscular weakness  Neurological ROS: positive for - numbness/tingling  Dermatological ROS: negative for rash    Physical Exam:  Vitals:    04/11/23 1021   BP: (!) 140/65   Pulse: 73   Weight: 130.7 kg (288 lb 2.3 oz)   Height: 5' (1.524 m)   PainSc:   7   PainLoc: Back     Body mass index is 56.27 kg/m².     Gen: NAD  Gait: gait intact  Psych:  Mood appropriate for given condition  HEENT: eyes anicteric   GI: Abd soft  CV: RRR  Lungs: breathing unlabored   ROM: limited AROM of the L spine in all planes, full ROM at ankles, knees and hips  Lumbar flexion 90 degrees, extension 50 degrees, side bending 30 degrees.    Sensation: intact to light touch in all dermatomes tested from L2-S1 bilaterally  Reflexes: 2+ b/l patella and 0/0 b/l achilles  Palpation: Diffusely tender over lumbar paraspinals   Tone: normal in the b/l knees and hips   Skin: intact  Extremities: No edema in b/l ankles or hands  Provacative tests:        Right Left   L2/3 Iliacus Hip flexion  5  5   L3/4 Qudratus Femoris Knee Extension  5  5   L4/5 Tib Anterior Ankle Dorsiflexion   5  5   L5/S1 Extensor Hallicus Longus Great toe extension  5  5                 S1/S2 Gastroc/Soleus Plantar Flexion  5  5       Imaging:  MRI lumbar spine 11/15/21  FINDINGS  There is a minor curvature in the lumbar region convex to the patient's left.  There are hypertrophic facet changes with facet arthropathy throughout the lumbar spine.  There is disc space narrowing primarily at L1-L2, L2-L3 and L3-L4.  There is generally stenotic changes in part on a developmental basis throughout the lumbosacral spine with other factors present as well.  The distal cord  terminates at L1-L2 and appears unremarkable.    At L1-L2, there is mild to moderate lumbar stenosis present.  Hypertrophic facet ligamentous changes as well as a broad-based disc protrusion measured in the AP dimension up to 4.8 mm.  There is mild foraminal restriction bilaterally without root contact.     At L2-L3, there is moderate lumbar stenosis present more prominently paracentrally to the right where there is a prominent disc protrusion/extrusion measured up to 6.6 mm likely compressing the traversing nerve root on the right.  There is mild foraminal restriction bilaterally without root contact within the neural foramina.    At L3-L4, there is moderate lumbar stenosis produced by broad based disc protrusion measured up to 5.4 mm in the AP dimension.  Hypertrophic facet ligamentous changes contribute to the lumbar stenosis.  There is foraminal restriction bilaterally more so on the right where there does appear to be contact of the exiting nerve root in the right foramen.    At L4-L5,  there is moderate to marked lumbar stenosis produced by hypertrophic facet and hypertrophic ligamentous changes.  There is a broad-based disc protrusion measured up to 3.9 mm in the AP dimension.  There is foraminal restriction bilaterally without definite root contact within the neural foramina.    At L5-S1,  there is a right paracentral disc protrusion measured up to 3.6 mm which may contact the traversing nerve root on the right.  There is no significant central stenosis.  There is foraminal restriction bilaterally.  There does appear to be contact of the exiting nerve root along its inferior margin in the left foramen.     Labs:  BMP  No results found for: NA, K, CL, CO2, BUN, CREATININE, CALCIUM, ANIONGAP, ESTGFRAFRICA, EGFRNONAA  No results found for: ALT, AST, GGT, ALKPHOS, BILITOT    Assessment:   Problem List Items Addressed This Visit    None  Visit Diagnoses       Lumbar radiculopathy    -  Primary    Relevant Orders     Case Request Operating Room: Injection-steroid-epidural-lumbar L5/S1 (Completed)    Pre-op testing        Relevant Orders    CBC Without Differential            67 y.o. year old female presents to the office with back pain, constant, 6/10, radiating down the back of both legs.  + numbness, no weakness.    4/11/23 - Ms. Ibarra returns the office today for follow-up.  Return of her typical lower back pain with radiating pain down bilateral legs.  Pain is worse with standing and walking relieved with rest.  She denies any new numbness or weakness.    - on exam she continues to have full strength and intact sensation to light touch  - lumbar MRI consistent with severe central canal stenosis at L4-5  - she has previously seen Neurosurgery who referred her for conservative treatment options  - relief with conservative options over the past 6 months including PT directed home exercises and NSAIDs  - she is status post L5-S1 NATASHA on 09/21/2022 with 70% relief lasting for over 5 months  - her typical pain and claudication symptoms have returned.  Her pain is limiting her mobility and interfering with the quality of her life  - we will schedule for repeat L5-S1 NATASHA.  The risks and benefits of this intervention, and alternative therapies were discussed with the patient.  The discussion of risks included infection, bleeding, need for additional procedures or surgery, nerve damage.  Questions regarding the procedure, risks, expected outcome, and possible side effects were solicited and answered to the patient's satisfaction.  Safia Ibarra wishes to proceed with the injection or procedure.  Written consent was obtained.  - follow-up 2-3 weeks post injection.  If she fails to get relief then we will consider getting an updated lumbar MRI and consider neurosurgery referral    : Not applicable    Yobani Nieves M.D.  Interventional Pain Medicine / Anesthesiology

## 2023-04-19 ENCOUNTER — OFFICE VISIT (OUTPATIENT)
Dept: CARDIOLOGY | Facility: CLINIC | Age: 68
End: 2023-04-19
Payer: COMMERCIAL

## 2023-04-19 VITALS
BODY MASS INDEX: 57.44 KG/M2 | HEIGHT: 60 IN | SYSTOLIC BLOOD PRESSURE: 121 MMHG | DIASTOLIC BLOOD PRESSURE: 57 MMHG | HEART RATE: 90 BPM | WEIGHT: 292.56 LBS

## 2023-04-19 DIAGNOSIS — E66.01 CLASS 3 SEVERE OBESITY DUE TO EXCESS CALORIES WITH SERIOUS COMORBIDITY AND BODY MASS INDEX (BMI) OF 50.0 TO 59.9 IN ADULT: Chronic | ICD-10-CM

## 2023-04-19 DIAGNOSIS — I08.0 MITRAL REGURGITATION AND AORTIC STENOSIS: Primary | ICD-10-CM

## 2023-04-19 DIAGNOSIS — E78.00 HYPERCHOLESTEROLEMIA: Chronic | ICD-10-CM

## 2023-04-19 DIAGNOSIS — I10 PRIMARY HYPERTENSION: Chronic | ICD-10-CM

## 2023-04-19 DIAGNOSIS — I49.49 PREMATURE BEATS: ICD-10-CM

## 2023-04-19 DIAGNOSIS — I65.23 CAROTID ARTERY PLAQUE, BILATERAL: Chronic | ICD-10-CM

## 2023-04-19 PROCEDURE — 1101F PR PT FALLS ASSESS DOC 0-1 FALLS W/OUT INJ PAST YR: ICD-10-PCS | Mod: CPTII,S$GLB,, | Performed by: INTERNAL MEDICINE

## 2023-04-19 PROCEDURE — 3078F DIAST BP <80 MM HG: CPT | Mod: CPTII,S$GLB,, | Performed by: INTERNAL MEDICINE

## 2023-04-19 PROCEDURE — 1159F PR MEDICATION LIST DOCUMENTED IN MEDICAL RECORD: ICD-10-PCS | Mod: CPTII,S$GLB,, | Performed by: INTERNAL MEDICINE

## 2023-04-19 PROCEDURE — 3074F SYST BP LT 130 MM HG: CPT | Mod: CPTII,S$GLB,, | Performed by: INTERNAL MEDICINE

## 2023-04-19 PROCEDURE — 99214 PR OFFICE/OUTPT VISIT, EST, LEVL IV, 30-39 MIN: ICD-10-PCS | Mod: S$GLB,,, | Performed by: INTERNAL MEDICINE

## 2023-04-19 PROCEDURE — 3288F PR FALLS RISK ASSESSMENT DOCUMENTED: ICD-10-PCS | Mod: CPTII,S$GLB,, | Performed by: INTERNAL MEDICINE

## 2023-04-19 PROCEDURE — 3288F FALL RISK ASSESSMENT DOCD: CPT | Mod: CPTII,S$GLB,, | Performed by: INTERNAL MEDICINE

## 2023-04-19 PROCEDURE — 1159F MED LIST DOCD IN RCRD: CPT | Mod: CPTII,S$GLB,, | Performed by: INTERNAL MEDICINE

## 2023-04-19 PROCEDURE — 99214 OFFICE O/P EST MOD 30 MIN: CPT | Mod: S$GLB,,, | Performed by: INTERNAL MEDICINE

## 2023-04-19 PROCEDURE — 3078F PR MOST RECENT DIASTOLIC BLOOD PRESSURE < 80 MM HG: ICD-10-PCS | Mod: CPTII,S$GLB,, | Performed by: INTERNAL MEDICINE

## 2023-04-19 PROCEDURE — 3008F BODY MASS INDEX DOCD: CPT | Mod: CPTII,S$GLB,, | Performed by: INTERNAL MEDICINE

## 2023-04-19 PROCEDURE — 1126F AMNT PAIN NOTED NONE PRSNT: CPT | Mod: CPTII,S$GLB,, | Performed by: INTERNAL MEDICINE

## 2023-04-19 PROCEDURE — 1126F PR PAIN SEVERITY QUANTIFIED, NO PAIN PRESENT: ICD-10-PCS | Mod: CPTII,S$GLB,, | Performed by: INTERNAL MEDICINE

## 2023-04-19 PROCEDURE — 3074F PR MOST RECENT SYSTOLIC BLOOD PRESSURE < 130 MM HG: ICD-10-PCS | Mod: CPTII,S$GLB,, | Performed by: INTERNAL MEDICINE

## 2023-04-19 PROCEDURE — 1101F PT FALLS ASSESS-DOCD LE1/YR: CPT | Mod: CPTII,S$GLB,, | Performed by: INTERNAL MEDICINE

## 2023-04-19 PROCEDURE — 3008F PR BODY MASS INDEX (BMI) DOCUMENTED: ICD-10-PCS | Mod: CPTII,S$GLB,, | Performed by: INTERNAL MEDICINE

## 2023-04-19 NOTE — PROGRESS NOTES
Subjective:    Patient ID:  Safia Ibarra is a 67 y.o. female who presents for Hypertension        HPI  DISCUSSED TESTS, NORMAL NUCLEAR STRESS TEST, MILD MR AND AS ON ECHOCARDIOGRAM WITH NORMAL LV FUNCTION, MILD CAROTID PLAQUE BILATERALLY, RECENT CBC HEMOGLOBIN 10.2, HAS BEEN HAVING BACK ISSUES TO HAVE INJECTION, SEE ROS    Past Medical History:   Diagnosis Date    HTN (hypertension)     Hyperlipidemia     Hypothyroidism     Osteoporosis      Past Surgical History:   Procedure Laterality Date    BREAST BIOPSY Left     core Dr. Camden marroquin over 5 yrs ago    EPIDURAL STEROID INJECTION INTO LUMBAR SPINE N/A 1/3/2022    Procedure: Injection-steroid-epidural-lumbar L5/S1;  Surgeon: Yobani Nieves MD;  Location: [facility] OR;  Service: Pain Management;  Laterality: N/A;    EPIDURAL STEROID INJECTION INTO LUMBAR SPINE N/A 9/21/2022    Procedure: Injection-steroid-epidural-lumbar L5/S1;  Surgeon: Yobani Nieves MD;  Location: [facility] OR;  Service: Pain Management;  Laterality: N/A;    HYSTERECTOMY       Family History   Problem Relation Age of Onset    Uterine cancer Mother     Breast cancer Paternal Aunt      Social History     Socioeconomic History    Marital status:    Tobacco Use    Smoking status: Never    Smokeless tobacco: Never       Review of patient's allergies indicates:   Allergen Reactions    Bactrim [sulfamethoxazole-trimethoprim] Itching       Current Outpatient Medications:     ALPRAZolam (XANAX) 0.25 MG tablet, Take 0.25 mg by mouth daily as needed., Disp: , Rfl:     amLODIPine (NORVASC) 5 MG tablet, Take 5 mg by mouth once daily., Disp: , Rfl:     diclofenac (VOLTAREN) 50 MG EC tablet, Take by mouth., Disp: , Rfl:     fesoterodine (TOVIAZ) 4 mg Tb24, Take 4 mg by mouth every evening., Disp: , Rfl:     HYDROcodone-acetaminophen (NORCO) 5-325 mg per tablet, Take 1 tablet by mouth every 6 (six) hours as needed for Pain., Disp: , Rfl:     levothyroxine (SYNTHROID, LEVOTHROID) 175 MCG tablet, , Disp:  , Rfl:     meloxicam (MOBIC) 15 MG tablet, Take 15 mg by mouth daily as needed., Disp: , Rfl:     minocycline (MINOCIN,DYNACIN) 100 MG capsule, Take 100 mg by mouth 2 (two) times daily., Disp: , Rfl:     nitroGLYCERIN (NITROSTAT) 0.4 MG SL tablet, For CHEST PAIN: Put 1 tablet under tongue every 5 minutes for 3 doses as needed. If no relief, GO TO ER., Disp: 25 tablet, Rfl: 0    nystatin (MYCOSTATIN) cream, 3 (three) times daily. Apply to affected area, Disp: , Rfl:     ondansetron (ZOFRAN-ODT) 4 MG TbDL, 4 mg every 6 (six) hours as needed., Disp: , Rfl:     rosuvastatin (CRESTOR) 10 MG tablet, Take 10 mg by mouth nightly., Disp: , Rfl:     tiZANidine (ZANAFLEX) 4 MG tablet, Take 4 mg by mouth every 8 (eight) hours as needed., Disp: , Rfl:     zolpidem (AMBIEN) 5 MG Tab, Take 5 mg by mouth nightly as needed., Disp: , Rfl:     Review of Systems   Constitutional: Negative for chills, diaphoresis, fever, malaise/fatigue and night sweats.   HENT:  Negative for congestion and nosebleeds.    Eyes:  Negative for blurred vision and redness.   Cardiovascular:  Positive for dyspnea on exertion (WEIGHT) and leg swelling. Negative for chest pain, claudication, cyanosis, irregular heartbeat, near-syncope, orthopnea, palpitations, paroxysmal nocturnal dyspnea and syncope.   Respiratory:  Negative for cough, hemoptysis, shortness of breath and wheezing.    Endocrine: Negative for cold intolerance, heat intolerance and polyuria.   Hematologic/Lymphatic: Negative for adenopathy. Does not bruise/bleed easily.   Skin:  Negative for color change and itching.   Musculoskeletal:  Positive for back pain. Negative for falls.   Gastrointestinal:  Negative for abdominal pain, change in bowel habit, dysphagia, jaundice, melena and nausea.   Genitourinary:  Negative for dysuria and flank pain.   Neurological:  Negative for brief paralysis, dizziness, focal weakness, loss of balance and numbness.   Psychiatric/Behavioral:  Negative for altered  mental status and depression.    Allergic/Immunologic: Negative for persistent infections.      Objective:      Vitals:    23 1141   BP: (!) 121/57   Pulse: 90   Weight: 132.7 kg (292 lb 8.8 oz)   Height: 5' (1.524 m)   PainSc: 0-No pain     Body mass index is 57.13 kg/m².    Physical Exam  Constitutional:       Appearance: She is morbidly obese.   Cardiovascular:      Rate and Rhythm: Normal rate and regular rhythm.      Pulses:           Carotid pulses are 2+ on the right side and 2+ on the left side.       Radial pulses are 2+ on the right side and 2+ on the left side.      Heart sounds: Murmur heard.   Systolic murmur is present.   Pulmonary:      Effort: Pulmonary effort is normal.      Breath sounds: Normal breath sounds.   Abdominal:      Palpations: Abdomen is soft.      Tenderness: There is no abdominal tenderness.   Musculoskeletal:      Right lower le+ Edema present.      Left lower le+ Edema present.   Neurological:      Mental Status: She is alert and oriented to person, place, and time.      Cranial Nerves: Cranial nerves 2-12 are intact.   Psychiatric:         Mood and Affect: Mood normal.         Speech: Speech normal.         Behavior: Behavior normal.               ..    Chemistry    No results found for: NA, K, CL, CO2, BUN, CREATININE, GLU No results found for: CALCIUM, ALKPHOS, AST, ALT, BILITOT, ESTGFRAFRICA, EGFRNONAA         ..No results found for: CHOL  No results found for: HDL  No results found for: LDLCALC  No results found for: TRIG  No results found for: CHOLHDL  ..  Lab Results   Component Value Date    WBC 6.13 2023    HGB 10.2 (L) 2023    HCT 33.9 (L) 2023    MCV 86 2023     (L) 2023       Test(s) Reviewed  I have reviewed the following in detail:  [x] Stress test   [] Angiography   [x] Echocardiogram   [x] Labs   [x] Other:       Assessment:         ICD-10-CM ICD-9-CM   1. Mitral regurgitation and aortic stenosis  I08.0 396.2   2.  Carotid artery plaque, bilateral  I65.23 433.10     433.30   3. Premature beats  I49.49 427.60   4. Primary hypertension  I10 401.9   5. Hypercholesterolemia  E78.00 272.0   6. Class 3 severe obesity due to excess calories with serious comorbidity and body mass index (BMI) of 50.0 to 59.9 in adult  E66.01 278.01    Z68.43 V85.43     Problem List Items Addressed This Visit          Cardiac/Vascular    Hypercholesterolemia    Primary hypertension    Premature beats       Endocrine    Class 3 severe obesity due to excess calories with serious comorbidity and body mass index (BMI) of 50.0 to 59.9 in adult     Other Visit Diagnoses       Mitral regurgitation and aortic stenosis    -  Primary    MILD    Carotid artery plaque, bilateral  (Chronic)                Plan:     F/U WITH PCP REG ANEMIA,ALL CV CLINICALLY STABLE, NO ANGINA, NO HF, NO TIA, BENIGN CLINICAL ARRHYTHMIA,CONTINUE CURRENT MEDS, EDUCATION, DIET, EXERCISE  AS MUCH AS POSSIBLE, NEEDS SIGNIFICANT WEIGHT LOSS RETURN TO CLINIC IN 6 MO      Mitral regurgitation and aortic stenosis  Comments:  MILD    Carotid artery plaque, bilateral    Premature beats    Primary hypertension    Hypercholesterolemia    Class 3 severe obesity due to excess calories with serious comorbidity and body mass index (BMI) of 50.0 to 59.9 in adult    RTC Low level/low impact aerobic exercise 5x's/wk. Heart healthy diet and risk factor modification.    See labs and med orders.    Aerobic exercise 5x's/wk. Heart healthy diet and risk factor modification.    See labs and med orders.

## 2023-04-26 ENCOUNTER — HOSPITAL ENCOUNTER (OUTPATIENT)
Dept: RADIOLOGY | Facility: HOSPITAL | Age: 68
Discharge: HOME | End: 2023-04-26
Attending: ANESTHESIOLOGY | Admitting: ANESTHESIOLOGY
Payer: MEDICARE

## 2023-04-26 ENCOUNTER — HOSPITAL ENCOUNTER (OUTPATIENT)
Facility: HOSPITAL | Age: 68
Discharge: HOME | End: 2023-04-26
Attending: ANESTHESIOLOGY | Admitting: ANESTHESIOLOGY
Payer: COMMERCIAL

## 2023-04-26 DIAGNOSIS — M54.50 LOWER BACK PAIN: ICD-10-CM

## 2023-04-26 DIAGNOSIS — M54.16 LUMBAR RADICULOPATHY: Primary | ICD-10-CM

## 2023-04-26 PROCEDURE — 25000003 PHARM REV CODE 250: Mod: PO | Performed by: ANESTHESIOLOGY

## 2023-04-26 PROCEDURE — 63600175 PHARM REV CODE 636 W HCPCS: Mod: PO | Performed by: ANESTHESIOLOGY

## 2023-04-26 PROCEDURE — 62323 PR INJ LUMBAR/SACRAL, W/IMAGING GUIDANCE: ICD-10-PCS | Mod: ,,, | Performed by: ANESTHESIOLOGY

## 2023-04-26 PROCEDURE — 62323 NJX INTERLAMINAR LMBR/SAC: CPT | Mod: ,,, | Performed by: ANESTHESIOLOGY

## 2023-04-26 PROCEDURE — 76000 FLUOROSCOPY <1 HR PHYS/QHP: CPT | Mod: TC,PO

## 2023-04-26 PROCEDURE — 62323 NJX INTERLAMINAR LMBR/SAC: CPT | Mod: PO | Performed by: ANESTHESIOLOGY

## 2023-04-26 PROCEDURE — 25500020 PHARM REV CODE 255: Mod: PO | Performed by: ANESTHESIOLOGY

## 2023-04-26 PROCEDURE — A4216 STERILE WATER/SALINE, 10 ML: HCPCS | Mod: PO | Performed by: ANESTHESIOLOGY

## 2023-04-26 RX ORDER — LIDOCAINE HYDROCHLORIDE 10 MG/ML
INJECTION, SOLUTION EPIDURAL; INFILTRATION; INTRACAUDAL; PERINEURAL
Status: DISCONTINUED | OUTPATIENT
Start: 2023-04-26 | End: 2023-04-26 | Stop reason: HOSPADM

## 2023-04-26 RX ORDER — SODIUM CHLORIDE 9 MG/ML
INJECTION, SOLUTION INTRAMUSCULAR; INTRAVENOUS; SUBCUTANEOUS
Status: DISCONTINUED | OUTPATIENT
Start: 2023-04-26 | End: 2023-04-26 | Stop reason: HOSPADM

## 2023-04-26 RX ORDER — ALPRAZOLAM 0.5 MG/1
0.5 TABLET, ORALLY DISINTEGRATING ORAL ONCE AS NEEDED
Status: COMPLETED | OUTPATIENT
Start: 2023-04-26 | End: 2023-04-26

## 2023-04-26 RX ORDER — METHYLPREDNISOLONE ACETATE 80 MG/ML
INJECTION, SUSPENSION INTRA-ARTICULAR; INTRALESIONAL; INTRAMUSCULAR; SOFT TISSUE
Status: DISCONTINUED | OUTPATIENT
Start: 2023-04-26 | End: 2023-04-26 | Stop reason: HOSPADM

## 2023-04-26 RX ADMIN — ALPRAZOLAM 0.5 MG: 0.5 TABLET, ORALLY DISINTEGRATING ORAL at 01:04

## 2023-04-26 NOTE — OP NOTE
"Procedure Note    Procedure Date: 4/26/2023    Procedure Performed:  L5/S1 lumbar interlaminar epidural steroid injection under fluoroscopy.    Indications: Patient has failed conservative therapy.      Pre-op diagnosis: Lumbar Radiculopathy    Post-op diagnosis: same    Physician: Yobani Nieves MD    IV anxiolysis medications: none    Medications injected: depomedrol 80mg, 1% Lidocaine 1ml, 2 mL sterile, preservative-free normal saline.    Local anesthetic used: 1% Lidocaine, 1 ml    Estimated Blood Loss: none    Complications:  none    Technique:  The patient was interviewed in the holding area and Risks/Benefits were discussed, including, but not limited to, the possibility of new or different pain, bleeding or infection.   All questions were answered.  The patient understood and accepted risks.  Consent was verfied.  A time-out was taken to identify patient and procedure prior to starting the procedure. The patient was placed in the prone position on the fluoroscopy table. The area of the lumbar spine was prepped with Chloraprep x2 and draped in a sterile manner. The L5/S1 interspace was identified and marked under AP fluoroscopy. The skin and subcutaneous tissues overlying the targeted interspace were anesthetized with 3-5 mL of 1% lidocaine using a 25G, 1.5" needle.  A 20G, 3.5" Tuohy epidural needle was directed toward the interspace under fluoroscopic guidance until the ligamentum flavum was engaged. From this point, a loss of resistance technique with a glass syringe and saline was used to identify entrance of the needle into the epidural space. Once loss of resistance was observed 1 mL of contrast solution was injected. An appropriate epidurogram was noted.  A 4 mL mixture consisting of saline, 1 mL 1% Lidocaine and 80 mg of depomedrol was injected slowly and without resistance.  The needle was flushed with normal saline and removed. The contrast was seen to be displaced after injection. Patient was " awake/responsive during all injections.  The patient tolerated the procedure well and was transferred to the .AC.. in stable condition.  The patient was monitored after the procedure and was given post-procedure and discharge instructions to follow at home. The patient was discharged in a stable condition.

## 2023-04-26 NOTE — DISCHARGE SUMMARY
[facility]  Discharge Note  Short Stay    Admit Date: 4/26/2023    Discharge Date: 4/26/2023    Attending Physician: Yobani Nieves     Discharge Provider: Yobani Nieves    Diagnoses:  There are no hospital problems to display for this patient.      Discharged Condition: Good    Final Diagnoses: Lumbar radiculopathy [M54.16]    Disposition: Home or Self Care    Hospital Course: No complications, uneventful    Outcome of Hospitalization, Treatment, Procedure, or Surgery:  Patient was admitted for outpatient interventional pain management procedure. The patient tolerated the procedure well with no complications.    Follow up/Patient Instructions:  Follow up as scheduled in Pain Management office in 2-3 weeks.  Patient has received instructions and follow up date and time.    Medications:  Continue previous medications    Discharge Procedure Orders   Notify your health care provider if you experience any of the following:  temperature >100.4     Notify your health care provider if you experience any of the following:  persistent nausea and vomiting or diarrhea     Notify your health care provider if you experience any of the following:  severe uncontrolled pain     Notify your health care provider if you experience any of the following:  redness, tenderness, or signs of infection (pain, swelling, redness, odor or green/yellow discharge around incision site)     Notify your health care provider if you experience any of the following:  difficulty breathing or increased cough     Notify your health care provider if you experience any of the following:  severe persistent headache     Notify your health care provider if you experience any of the following:  worsening rash     Notify your health care provider if you experience any of the following:  persistent dizziness, light-headedness, or visual disturbances     Notify your health care provider if you experience any of the following:  increased confusion or  weakness     Activity as tolerated

## 2023-04-27 VITALS
HEART RATE: 101 BPM | DIASTOLIC BLOOD PRESSURE: 83 MMHG | RESPIRATION RATE: 18 BRPM | TEMPERATURE: 99 F | SYSTOLIC BLOOD PRESSURE: 135 MMHG | OXYGEN SATURATION: 99 % | HEIGHT: 60 IN | WEIGHT: 292 LBS | BODY MASS INDEX: 57.33 KG/M2

## 2023-05-09 ENCOUNTER — OFFICE VISIT (OUTPATIENT)
Dept: PAIN MEDICINE | Facility: CLINIC | Age: 68
End: 2023-05-09
Payer: COMMERCIAL

## 2023-05-09 VITALS
WEIGHT: 293 LBS | BODY MASS INDEX: 57.52 KG/M2 | HEART RATE: 70 BPM | SYSTOLIC BLOOD PRESSURE: 145 MMHG | DIASTOLIC BLOOD PRESSURE: 66 MMHG | HEIGHT: 60 IN

## 2023-05-09 DIAGNOSIS — M54.9 DORSALGIA, UNSPECIFIED: Primary | ICD-10-CM

## 2023-05-09 DIAGNOSIS — M48.062 SPINAL STENOSIS OF LUMBAR REGION WITH NEUROGENIC CLAUDICATION: ICD-10-CM

## 2023-05-09 DIAGNOSIS — M54.16 LUMBAR RADICULOPATHY: ICD-10-CM

## 2023-05-09 PROCEDURE — 3288F FALL RISK ASSESSMENT DOCD: CPT | Mod: CPTII,S$GLB,, | Performed by: ANESTHESIOLOGY

## 2023-05-09 PROCEDURE — 3078F PR MOST RECENT DIASTOLIC BLOOD PRESSURE < 80 MM HG: ICD-10-PCS | Mod: CPTII,S$GLB,, | Performed by: ANESTHESIOLOGY

## 2023-05-09 PROCEDURE — 1159F MED LIST DOCD IN RCRD: CPT | Mod: CPTII,S$GLB,, | Performed by: ANESTHESIOLOGY

## 2023-05-09 PROCEDURE — 99214 OFFICE O/P EST MOD 30 MIN: CPT | Mod: S$GLB,,, | Performed by: ANESTHESIOLOGY

## 2023-05-09 PROCEDURE — 99999 PR PBB SHADOW E&M-EST. PATIENT-LVL IV: CPT | Mod: PBBFAC,,, | Performed by: ANESTHESIOLOGY

## 2023-05-09 PROCEDURE — 3078F DIAST BP <80 MM HG: CPT | Mod: CPTII,S$GLB,, | Performed by: ANESTHESIOLOGY

## 2023-05-09 PROCEDURE — 3288F PR FALLS RISK ASSESSMENT DOCUMENTED: ICD-10-PCS | Mod: CPTII,S$GLB,, | Performed by: ANESTHESIOLOGY

## 2023-05-09 PROCEDURE — 1125F AMNT PAIN NOTED PAIN PRSNT: CPT | Mod: CPTII,S$GLB,, | Performed by: ANESTHESIOLOGY

## 2023-05-09 PROCEDURE — 99214 PR OFFICE/OUTPT VISIT, EST, LEVL IV, 30-39 MIN: ICD-10-PCS | Mod: S$GLB,,, | Performed by: ANESTHESIOLOGY

## 2023-05-09 PROCEDURE — 1160F PR REVIEW ALL MEDS BY PRESCRIBER/CLIN PHARMACIST DOCUMENTED: ICD-10-PCS | Mod: CPTII,S$GLB,, | Performed by: ANESTHESIOLOGY

## 2023-05-09 PROCEDURE — 3077F PR MOST RECENT SYSTOLIC BLOOD PRESSURE >= 140 MM HG: ICD-10-PCS | Mod: CPTII,S$GLB,, | Performed by: ANESTHESIOLOGY

## 2023-05-09 PROCEDURE — 3008F PR BODY MASS INDEX (BMI) DOCUMENTED: ICD-10-PCS | Mod: CPTII,S$GLB,, | Performed by: ANESTHESIOLOGY

## 2023-05-09 PROCEDURE — 1159F PR MEDICATION LIST DOCUMENTED IN MEDICAL RECORD: ICD-10-PCS | Mod: CPTII,S$GLB,, | Performed by: ANESTHESIOLOGY

## 2023-05-09 PROCEDURE — 99999 PR PBB SHADOW E&M-EST. PATIENT-LVL IV: ICD-10-PCS | Mod: PBBFAC,,, | Performed by: ANESTHESIOLOGY

## 2023-05-09 PROCEDURE — 1125F PR PAIN SEVERITY QUANTIFIED, PAIN PRESENT: ICD-10-PCS | Mod: CPTII,S$GLB,, | Performed by: ANESTHESIOLOGY

## 2023-05-09 PROCEDURE — 1101F PT FALLS ASSESS-DOCD LE1/YR: CPT | Mod: CPTII,S$GLB,, | Performed by: ANESTHESIOLOGY

## 2023-05-09 PROCEDURE — 3008F BODY MASS INDEX DOCD: CPT | Mod: CPTII,S$GLB,, | Performed by: ANESTHESIOLOGY

## 2023-05-09 PROCEDURE — 3077F SYST BP >= 140 MM HG: CPT | Mod: CPTII,S$GLB,, | Performed by: ANESTHESIOLOGY

## 2023-05-09 PROCEDURE — 1160F RVW MEDS BY RX/DR IN RCRD: CPT | Mod: CPTII,S$GLB,, | Performed by: ANESTHESIOLOGY

## 2023-05-09 PROCEDURE — 1101F PR PT FALLS ASSESS DOC 0-1 FALLS W/OUT INJ PAST YR: ICD-10-PCS | Mod: CPTII,S$GLB,, | Performed by: ANESTHESIOLOGY

## 2023-05-09 RX ORDER — TRAMADOL HYDROCHLORIDE 50 MG/1
50 TABLET ORAL EVERY 8 HOURS PRN
Qty: 21 TABLET | Refills: 0 | Status: SHIPPED | OUTPATIENT
Start: 2023-05-09 | End: 2023-05-24 | Stop reason: SDUPTHER

## 2023-05-09 RX ORDER — PREGABALIN 75 MG/1
75 CAPSULE ORAL 2 TIMES DAILY
Qty: 60 CAPSULE | Refills: 1 | Status: SHIPPED | OUTPATIENT
Start: 2023-05-09 | End: 2023-06-20 | Stop reason: SDUPTHER

## 2023-05-09 NOTE — H&P (VIEW-ONLY)
[facility] Follow Up Evaluation    Referred by: Dr. Singletary  Reason for referral: back pain    CC:   Chief Complaint   Patient presents with    Back Pain      Last 3 PDI Scores 9/9/2022   Pain Disability Index (PDI) 41       Interval HPI 9/9/22:  Ms. Ibarra returns the office today for follow-up.  She is s/p L5/S1 NATASHA on 4/26/23 without relief.  She continues to have lower back pain that is radiating down the bilateral buttocks and bilateral legs.  She reports her pain is 9/10.  She is with her son.  Reports that pain got worse few days ago when she was picking up some boxes of candy.  She denies any new numbness or weakness.  Denies any fevers.    Pain intervention history:  - s/p L5-S1 NATASHA on 1/3/22 with 80% relief   - s/p L5-S1 NATASHA on 9/21/22 with 70% relief lasting for over 5 months  -  s/p L5/S1 NATASHA on 4/26/23 without relief    HPI:   Safia Ibarra is a 67 y.o. female who complains of back pain    Onset: 6-7 months  Inciting Event: none  Progression: since onset, pain is gradually worsening  Current Pain Score: 6/10  Typical Range: 2-10/10  Timing: constant  Quality: shooting  Radiation: yes, down the back both legs  Associated numbness or weakness: yes numbness   Exacerbated by: walking  Allievated by: laying down  Is Pain Level Acceptable?: No    Previous Therapies:  PT/OT: yes  HEP: yes  Interventions:   Surgery:  Medications:   - NSAIDS:   - MSK Relaxants:   - TCAs:   - SNRIs:   - Topicals:   - Anticonvulsants:  - Opioids:     History:    Current Outpatient Medications:     ALPRAZolam (XANAX) 0.25 MG tablet, Take 0.25 mg by mouth daily as needed., Disp: , Rfl:     amLODIPine (NORVASC) 5 MG tablet, Take 5 mg by mouth once daily., Disp: , Rfl:     diclofenac (VOLTAREN) 50 MG EC tablet, Take by mouth., Disp: , Rfl:     fesoterodine (TOVIAZ) 4 mg Tb24, Take 4 mg by mouth every evening., Disp: , Rfl:     levothyroxine (SYNTHROID, LEVOTHROID) 175 MCG tablet, , Disp: , Rfl:     meloxicam (MOBIC) 15  MG tablet, Take 15 mg by mouth daily as needed., Disp: , Rfl:     nitroGLYCERIN (NITROSTAT) 0.4 MG SL tablet, For CHEST PAIN: Put 1 tablet under tongue every 5 minutes for 3 doses as needed. If no relief, GO TO ER., Disp: 25 tablet, Rfl: 0    nystatin (MYCOSTATIN) cream, 3 (three) times daily. Apply to affected area, Disp: , Rfl:     ondansetron (ZOFRAN-ODT) 4 MG TbDL, 4 mg every 6 (six) hours as needed., Disp: , Rfl:     rosuvastatin (CRESTOR) 10 MG tablet, Take 10 mg by mouth nightly., Disp: , Rfl:     tiZANidine (ZANAFLEX) 4 MG tablet, Take 4 mg by mouth every 8 (eight) hours as needed., Disp: , Rfl:     minocycline (MINOCIN,DYNACIN) 100 MG capsule, Take 100 mg by mouth 2 (two) times daily., Disp: , Rfl:     pregabalin (LYRICA) 75 MG capsule, Take 1 capsule (75 mg total) by mouth 2 (two) times daily., Disp: 60 capsule, Rfl: 1    traMADoL (ULTRAM) 50 mg tablet, Take 1 tablet (50 mg total) by mouth every 8 (eight) hours as needed for Pain., Disp: 21 tablet, Rfl: 0    zolpidem (AMBIEN) 5 MG Tab, Take 5 mg by mouth nightly as needed., Disp: , Rfl:     Past Medical History:   Diagnosis Date    HTN (hypertension)     Hyperlipidemia     Hypothyroidism     Osteoporosis        Past Surgical History:   Procedure Laterality Date    BREAST BIOPSY Left     core Dr. Hannah benign over 5 yrs ago    EPIDURAL STEROID INJECTION INTO LUMBAR SPINE N/A 1/3/2022    Procedure: Injection-steroid-epidural-lumbar L5/S1;  Surgeon: Yobani Nieves MD;  Location: [facility] OR;  Service: Pain Management;  Laterality: N/A;    EPIDURAL STEROID INJECTION INTO LUMBAR SPINE N/A 9/21/2022    Procedure: Injection-steroid-epidural-lumbar L5/S1;  Surgeon: Yobani Nieves MD;  Location: [facility] OR;  Service: Pain Management;  Laterality: N/A;    EPIDURAL STEROID INJECTION INTO LUMBAR SPINE N/A 4/26/2023    Procedure: Injection-steroid-epidural-lumbar L5/S1;  Surgeon: Yobani Nieves MD;  Location: [facility];  Service: Pain Management;  Laterality: N/A;     HYSTERECTOMY         Family History   Problem Relation Age of Onset    Uterine cancer Mother     Breast cancer Paternal Aunt        Social History     Socioeconomic History    Marital status:    Tobacco Use    Smoking status: Never    Smokeless tobacco: Never       Review of patient's allergies indicates:   Allergen Reactions    Bactrim [sulfamethoxazole-trimethoprim] Itching       Review of Systems:  General ROS: negative for - fever  Psychological ROS: negative for - hostility  Hematological and Lymphatic ROS: negative for - bleeding problems  Endocrine ROS: negative for - unexpected weight changes  Respiratory ROS: no cough, shortness of breath, or wheezing  Cardiovascular ROS: no chest pain or dyspnea on exertion  Gastrointestinal ROS: no abdominal pain, change in bowel habits, or black or bloody stools  Musculoskeletal ROS: negative for - muscular weakness  Neurological ROS: positive for - numbness/tingling  Dermatological ROS: negative for rash    Physical Exam:  Vitals:    05/09/23 1013   BP: (!) 145/66   Pulse: 70   Weight: (!) 136.1 kg (300 lb 0.7 oz)   Height: 5' (1.524 m)   PainSc:   9   PainLoc: Back     Body mass index is 58.6 kg/m².     Gen: NAD  Gait: gait intact  Psych:  Mood appropriate for given condition  HEENT: eyes anicteric   GI: Abd soft  CV: RRR  Lungs: breathing unlabored   ROM: limited AROM of the L spine in all planes, full ROM at ankles, knees and hips  Lumbar flexion 90 degrees, extension 50 degrees, side bending 30 degrees.    Sensation: intact to light touch in all dermatomes tested from L2-S1 bilaterally  Reflexes: 2+ b/l patella and 0/0 b/l achilles  Palpation: Diffusely tender over lumbar paraspinals   Tone: normal in the b/l knees and hips   Skin: intact  Extremities: No edema in b/l ankles or hands  Provacative tests:        Right Left   L2/3 Iliacus Hip flexion  5  5   L3/4 Qudratus Femoris Knee Extension  5  5   L4/5 Tib Anterior Ankle Dorsiflexion   5  5   L5/S1 Extensor  Hallicus Longus Great toe extension  5  5                 S1/S2 Gastroc/Soleus Plantar Flexion  5  5       Imaging:  MRI lumbar spine 11/15/21  FINDINGS  There is a minor curvature in the lumbar region convex to the patient's left.  There are hypertrophic facet changes with facet arthropathy throughout the lumbar spine.  There is disc space narrowing primarily at L1-L2, L2-L3 and L3-L4.  There is generally stenotic changes in part on a developmental basis throughout the lumbosacral spine with other factors present as well.  The distal cord terminates at L1-L2 and appears unremarkable.    At L1-L2, there is mild to moderate lumbar stenosis present.  Hypertrophic facet ligamentous changes as well as a broad-based disc protrusion measured in the AP dimension up to 4.8 mm.  There is mild foraminal restriction bilaterally without root contact.  At L2-L3, there is moderate lumbar stenosis present more prominently paracentrally to the right where there is a prominent disc protrusion/extrusion measured up to 6.6 mm likely compressing the traversing nerve root on the right.  There is mild foraminal restriction bilaterally without root contact within the neural foramina.  At L3-L4, there is moderate lumbar stenosis produced by broad based disc protrusion measured up to 5.4 mm in the AP dimension.  Hypertrophic facet ligamentous changes contribute to the lumbar stenosis.  There is foraminal restriction bilaterally more so on the right where there does appear to be contact of the exiting nerve root in the right foramen.  At L4-L5,  there is moderate to marked lumbar stenosis produced by hypertrophic facet and hypertrophic ligamentous changes.  There is a broad-based disc protrusion measured up to 3.9 mm in the AP dimension.  There is foraminal restriction bilaterally without definite root contact within the neural foramina.  At L5-S1,  there is a right paracentral disc protrusion measured up to 3.6 mm which may contact the  traversing nerve root on the right.  There is no significant central stenosis.  There is foraminal restriction bilaterally.  There does appear to be contact of the exiting nerve root along its inferior margin in the left foramen.     Labs:  BMP  No results found for: NA, K, CL, CO2, BUN, CREATININE, CALCIUM, ANIONGAP, ESTGFRAFRICA, EGFRNONAA  No results found for: ALT, AST, GGT, ALKPHOS, BILITOT    Assessment:   Problem List Items Addressed This Visit    None  Visit Diagnoses       Dorsalgia, unspecified    -  Primary    Relevant Orders    MRI Lumbar Spine Without Contrast    Lumbar radiculopathy        Relevant Medications    pregabalin (LYRICA) 75 MG capsule    traMADoL (ULTRAM) 50 mg tablet    Spinal stenosis of lumbar region with neurogenic claudication                  67 y.o. year old female presents to the office with back pain, constant, 6/10, radiating down the back of both legs.  + numbness, no weakness.    5/9/23 -  Ms. Ibarra returns the office today for follow-up.  She is s/p L5/S1 NATASHA on 4/26/23 without relief.  She continues to have lower back pain that is radiating down the bilateral buttocks and bilateral legs.  She reports her pain is 9/10.  She is with her son.  Reports that pain got worse few days ago when she was picking up some boxes of candy.  She denies any new numbness or weakness.  Denies any fevers.    - on exam she continues to have full strength and intact sensation to light touch  - independently reviewed her lumbar MRI is consistent with severe central canal narrowing at L3-4 and L4-5.  She has disc protrusion at L2-3 to the right paracentral region also causing at least moderate central canal narrowing  - over the past 6 months she done conservative care including PT directed home exercises and NSAIDs.  We discussed the importance of weight loss.  - she previously had excellent response to lumbar NATASHA however this time she continues to have severe pain that is limiting her mobility and  interfering with her daily living.  I prescribed her some Lyrica to trial for the neuropathic component of her pain.  She will take 75 mg b.i.d..  I have also prescribed her some tramadol to use for breakthrough severe pain.  I have ordered an updated lumbar MRI to better evaluate for any interval changes in her MRI.  We discussed follow up with Neurosurgery however she is very hesitant to consider surgical options at this time.  I think it is reasonable to try and maximize conservative treatment as she does not have any numbness or weakness.  Once her MRI is complete I will take a look at it and we will call her with further treatment recommendations      : Not applicable    Yobani Nieves M.D.  Interventional Pain Medicine / Anesthesiology

## 2023-05-09 NOTE — PROGRESS NOTES
[facility] Follow Up Evaluation    Referred by: Dr. Singletary  Reason for referral: back pain    CC:   Chief Complaint   Patient presents with    Back Pain      Last 3 PDI Scores 9/9/2022   Pain Disability Index (PDI) 41       Interval HPI 9/9/22:  Ms. Ibarra returns the office today for follow-up.  She is s/p L5/S1 NATASHA on 4/26/23 without relief.  She continues to have lower back pain that is radiating down the bilateral buttocks and bilateral legs.  She reports her pain is 9/10.  She is with her son.  Reports that pain got worse few days ago when she was picking up some boxes of candy.  She denies any new numbness or weakness.  Denies any fevers.    Pain intervention history:  - s/p L5-S1 NATASHA on 1/3/22 with 80% relief   - s/p L5-S1 NATASHA on 9/21/22 with 70% relief lasting for over 5 months  -  s/p L5/S1 NATASHA on 4/26/23 without relief    HPI:   Safia Ibarra is a 67 y.o. female who complains of back pain    Onset: 6-7 months  Inciting Event: none  Progression: since onset, pain is gradually worsening  Current Pain Score: 6/10  Typical Range: 2-10/10  Timing: constant  Quality: shooting  Radiation: yes, down the back both legs  Associated numbness or weakness: yes numbness   Exacerbated by: walking  Allievated by: laying down  Is Pain Level Acceptable?: No    Previous Therapies:  PT/OT: yes  HEP: yes  Interventions:   Surgery:  Medications:   - NSAIDS:   - MSK Relaxants:   - TCAs:   - SNRIs:   - Topicals:   - Anticonvulsants:  - Opioids:     History:    Current Outpatient Medications:     ALPRAZolam (XANAX) 0.25 MG tablet, Take 0.25 mg by mouth daily as needed., Disp: , Rfl:     amLODIPine (NORVASC) 5 MG tablet, Take 5 mg by mouth once daily., Disp: , Rfl:     diclofenac (VOLTAREN) 50 MG EC tablet, Take by mouth., Disp: , Rfl:     fesoterodine (TOVIAZ) 4 mg Tb24, Take 4 mg by mouth every evening., Disp: , Rfl:     levothyroxine (SYNTHROID, LEVOTHROID) 175 MCG tablet, , Disp: , Rfl:     meloxicam (MOBIC) 15  MG tablet, Take 15 mg by mouth daily as needed., Disp: , Rfl:     nitroGLYCERIN (NITROSTAT) 0.4 MG SL tablet, For CHEST PAIN: Put 1 tablet under tongue every 5 minutes for 3 doses as needed. If no relief, GO TO ER., Disp: 25 tablet, Rfl: 0    nystatin (MYCOSTATIN) cream, 3 (three) times daily. Apply to affected area, Disp: , Rfl:     ondansetron (ZOFRAN-ODT) 4 MG TbDL, 4 mg every 6 (six) hours as needed., Disp: , Rfl:     rosuvastatin (CRESTOR) 10 MG tablet, Take 10 mg by mouth nightly., Disp: , Rfl:     tiZANidine (ZANAFLEX) 4 MG tablet, Take 4 mg by mouth every 8 (eight) hours as needed., Disp: , Rfl:     minocycline (MINOCIN,DYNACIN) 100 MG capsule, Take 100 mg by mouth 2 (two) times daily., Disp: , Rfl:     pregabalin (LYRICA) 75 MG capsule, Take 1 capsule (75 mg total) by mouth 2 (two) times daily., Disp: 60 capsule, Rfl: 1    traMADoL (ULTRAM) 50 mg tablet, Take 1 tablet (50 mg total) by mouth every 8 (eight) hours as needed for Pain., Disp: 21 tablet, Rfl: 0    zolpidem (AMBIEN) 5 MG Tab, Take 5 mg by mouth nightly as needed., Disp: , Rfl:     Past Medical History:   Diagnosis Date    HTN (hypertension)     Hyperlipidemia     Hypothyroidism     Osteoporosis        Past Surgical History:   Procedure Laterality Date    BREAST BIOPSY Left     core Dr. Hannah benign over 5 yrs ago    EPIDURAL STEROID INJECTION INTO LUMBAR SPINE N/A 1/3/2022    Procedure: Injection-steroid-epidural-lumbar L5/S1;  Surgeon: Yobani Nieves MD;  Location: [facility] OR;  Service: Pain Management;  Laterality: N/A;    EPIDURAL STEROID INJECTION INTO LUMBAR SPINE N/A 9/21/2022    Procedure: Injection-steroid-epidural-lumbar L5/S1;  Surgeon: Yobani Nieves MD;  Location: [facility] OR;  Service: Pain Management;  Laterality: N/A;    EPIDURAL STEROID INJECTION INTO LUMBAR SPINE N/A 4/26/2023    Procedure: Injection-steroid-epidural-lumbar L5/S1;  Surgeon: Yobani Nieves MD;  Location: [facility];  Service: Pain Management;  Laterality: N/A;     HYSTERECTOMY         Family History   Problem Relation Age of Onset    Uterine cancer Mother     Breast cancer Paternal Aunt        Social History     Socioeconomic History    Marital status:    Tobacco Use    Smoking status: Never    Smokeless tobacco: Never       Review of patient's allergies indicates:   Allergen Reactions    Bactrim [sulfamethoxazole-trimethoprim] Itching       Review of Systems:  General ROS: negative for - fever  Psychological ROS: negative for - hostility  Hematological and Lymphatic ROS: negative for - bleeding problems  Endocrine ROS: negative for - unexpected weight changes  Respiratory ROS: no cough, shortness of breath, or wheezing  Cardiovascular ROS: no chest pain or dyspnea on exertion  Gastrointestinal ROS: no abdominal pain, change in bowel habits, or black or bloody stools  Musculoskeletal ROS: negative for - muscular weakness  Neurological ROS: positive for - numbness/tingling  Dermatological ROS: negative for rash    Physical Exam:  Vitals:    05/09/23 1013   BP: (!) 145/66   Pulse: 70   Weight: (!) 136.1 kg (300 lb 0.7 oz)   Height: 5' (1.524 m)   PainSc:   9   PainLoc: Back     Body mass index is 58.6 kg/m².     Gen: NAD  Gait: gait intact  Psych:  Mood appropriate for given condition  HEENT: eyes anicteric   GI: Abd soft  CV: RRR  Lungs: breathing unlabored   ROM: limited AROM of the L spine in all planes, full ROM at ankles, knees and hips  Lumbar flexion 90 degrees, extension 50 degrees, side bending 30 degrees.    Sensation: intact to light touch in all dermatomes tested from L2-S1 bilaterally  Reflexes: 2+ b/l patella and 0/0 b/l achilles  Palpation: Diffusely tender over lumbar paraspinals   Tone: normal in the b/l knees and hips   Skin: intact  Extremities: No edema in b/l ankles or hands  Provacative tests:        Right Left   L2/3 Iliacus Hip flexion  5  5   L3/4 Qudratus Femoris Knee Extension  5  5   L4/5 Tib Anterior Ankle Dorsiflexion   5  5   L5/S1 Extensor  Hallicus Longus Great toe extension  5  5                 S1/S2 Gastroc/Soleus Plantar Flexion  5  5       Imaging:  MRI lumbar spine 11/15/21  FINDINGS  There is a minor curvature in the lumbar region convex to the patient's left.  There are hypertrophic facet changes with facet arthropathy throughout the lumbar spine.  There is disc space narrowing primarily at L1-L2, L2-L3 and L3-L4.  There is generally stenotic changes in part on a developmental basis throughout the lumbosacral spine with other factors present as well.  The distal cord terminates at L1-L2 and appears unremarkable.    At L1-L2, there is mild to moderate lumbar stenosis present.  Hypertrophic facet ligamentous changes as well as a broad-based disc protrusion measured in the AP dimension up to 4.8 mm.  There is mild foraminal restriction bilaterally without root contact.  At L2-L3, there is moderate lumbar stenosis present more prominently paracentrally to the right where there is a prominent disc protrusion/extrusion measured up to 6.6 mm likely compressing the traversing nerve root on the right.  There is mild foraminal restriction bilaterally without root contact within the neural foramina.  At L3-L4, there is moderate lumbar stenosis produced by broad based disc protrusion measured up to 5.4 mm in the AP dimension.  Hypertrophic facet ligamentous changes contribute to the lumbar stenosis.  There is foraminal restriction bilaterally more so on the right where there does appear to be contact of the exiting nerve root in the right foramen.  At L4-L5,  there is moderate to marked lumbar stenosis produced by hypertrophic facet and hypertrophic ligamentous changes.  There is a broad-based disc protrusion measured up to 3.9 mm in the AP dimension.  There is foraminal restriction bilaterally without definite root contact within the neural foramina.  At L5-S1,  there is a right paracentral disc protrusion measured up to 3.6 mm which may contact the  traversing nerve root on the right.  There is no significant central stenosis.  There is foraminal restriction bilaterally.  There does appear to be contact of the exiting nerve root along its inferior margin in the left foramen.     Labs:  BMP  No results found for: NA, K, CL, CO2, BUN, CREATININE, CALCIUM, ANIONGAP, ESTGFRAFRICA, EGFRNONAA  No results found for: ALT, AST, GGT, ALKPHOS, BILITOT    Assessment:   Problem List Items Addressed This Visit    None  Visit Diagnoses       Dorsalgia, unspecified    -  Primary    Relevant Orders    MRI Lumbar Spine Without Contrast    Lumbar radiculopathy        Relevant Medications    pregabalin (LYRICA) 75 MG capsule    traMADoL (ULTRAM) 50 mg tablet    Spinal stenosis of lumbar region with neurogenic claudication                  67 y.o. year old female presents to the office with back pain, constant, 6/10, radiating down the back of both legs.  + numbness, no weakness.    5/9/23 -  Ms. Ibarra returns the office today for follow-up.  She is s/p L5/S1 NATASHA on 4/26/23 without relief.  She continues to have lower back pain that is radiating down the bilateral buttocks and bilateral legs.  She reports her pain is 9/10.  She is with her son.  Reports that pain got worse few days ago when she was picking up some boxes of candy.  She denies any new numbness or weakness.  Denies any fevers.    - on exam she continues to have full strength and intact sensation to light touch  - independently reviewed her lumbar MRI is consistent with severe central canal narrowing at L3-4 and L4-5.  She has disc protrusion at L2-3 to the right paracentral region also causing at least moderate central canal narrowing  - over the past 6 months she done conservative care including PT directed home exercises and NSAIDs.  We discussed the importance of weight loss.  - she previously had excellent response to lumbar NATASHA however this time she continues to have severe pain that is limiting her mobility and  interfering with her daily living.  I prescribed her some Lyrica to trial for the neuropathic component of her pain.  She will take 75 mg b.i.d..  I have also prescribed her some tramadol to use for breakthrough severe pain.  I have ordered an updated lumbar MRI to better evaluate for any interval changes in her MRI.  We discussed follow up with Neurosurgery however she is very hesitant to consider surgical options at this time.  I think it is reasonable to try and maximize conservative treatment as she does not have any numbness or weakness.  Once her MRI is complete I will take a look at it and we will call her with further treatment recommendations      : Not applicable    Yobani Nieves M.D.  Interventional Pain Medicine / Anesthesiology

## 2023-05-13 ENCOUNTER — HOSPITAL ENCOUNTER (OUTPATIENT)
Dept: RADIOLOGY | Facility: HOSPITAL | Age: 68
Discharge: HOME | End: 2023-05-13
Attending: ANESTHESIOLOGY
Payer: COMMERCIAL

## 2023-05-13 DIAGNOSIS — M54.9 DORSALGIA, UNSPECIFIED: ICD-10-CM

## 2023-05-13 PROCEDURE — 72148 MRI LUMBAR SPINE W/O DYE: CPT | Mod: 26,,, | Performed by: RADIOLOGY

## 2023-05-13 PROCEDURE — 72148 MRI LUMBAR SPINE W/O DYE: CPT | Mod: TC,PO

## 2023-05-13 PROCEDURE — 72148 MRI LUMBAR SPINE WITHOUT CONTRAST: ICD-10-PCS | Mod: 26,,, | Performed by: RADIOLOGY

## 2023-05-15 ENCOUNTER — TELEPHONE (OUTPATIENT)
Dept: PAIN MEDICINE | Facility: CLINIC | Age: 68
End: 2023-05-15
Payer: MEDICARE

## 2023-05-15 NOTE — TELEPHONE ENCOUNTER
Please let Ms. Davidson know I have reviewed her MRI.  She has a disc extrusion at L3-4 that is likely causing her pain.      We can schedule for an NATASHA        Physician - Dr Nieves    Type of Procedure/Injection - Lumbar Epidural  L4/5           Laterality - NA      Type of Sedation - Local      Need to hold medication - Yes      NSAIDs for 2 days      Clearance needed - No      Follow up - 1 week

## 2023-05-16 ENCOUNTER — TELEPHONE (OUTPATIENT)
Dept: PAIN MEDICINE | Facility: CLINIC | Age: 68
End: 2023-05-16
Payer: MEDICARE

## 2023-05-18 DIAGNOSIS — M54.16 LUMBAR RADICULOPATHY: Primary | ICD-10-CM

## 2023-05-18 RX ORDER — ALPRAZOLAM 0.5 MG/1
1 TABLET, ORALLY DISINTEGRATING ORAL ONCE AS NEEDED
Status: CANCELLED | OUTPATIENT
Start: 2023-05-18 | End: 2034-10-14

## 2023-05-18 NOTE — TELEPHONE ENCOUNTER
Lumbar NATASHA and f/u scheduled with pt.  Instructions given and pt aware a ride is needed home day of procedure.

## 2023-05-24 DIAGNOSIS — M54.16 LUMBAR RADICULOPATHY: ICD-10-CM

## 2023-05-25 RX ORDER — TRAMADOL HYDROCHLORIDE 50 MG/1
50 TABLET ORAL EVERY 8 HOURS PRN
Qty: 21 TABLET | Refills: 0 | Status: SHIPPED | OUTPATIENT
Start: 2023-05-25 | End: 2023-06-20 | Stop reason: SDUPTHER

## 2023-06-05 ENCOUNTER — HOSPITAL ENCOUNTER (OUTPATIENT)
Dept: RADIOLOGY | Facility: HOSPITAL | Age: 68
Discharge: HOME | End: 2023-06-05
Attending: ANESTHESIOLOGY | Admitting: ANESTHESIOLOGY
Payer: MEDICARE

## 2023-06-05 ENCOUNTER — HOSPITAL ENCOUNTER (OUTPATIENT)
Facility: HOSPITAL | Age: 68
Discharge: HOME | End: 2023-06-05
Attending: ANESTHESIOLOGY | Admitting: ANESTHESIOLOGY
Payer: COMMERCIAL

## 2023-06-05 DIAGNOSIS — M54.50 LOWER BACK PAIN: ICD-10-CM

## 2023-06-05 DIAGNOSIS — M54.16 LUMBAR RADICULOPATHY: Primary | ICD-10-CM

## 2023-06-05 PROCEDURE — 62323 NJX INTERLAMINAR LMBR/SAC: CPT | Mod: PO | Performed by: ANESTHESIOLOGY

## 2023-06-05 PROCEDURE — 25000003 PHARM REV CODE 250: Mod: PO | Performed by: ANESTHESIOLOGY

## 2023-06-05 PROCEDURE — 62323 PR INJ LUMBAR/SACRAL, W/IMAGING GUIDANCE: ICD-10-PCS | Mod: ,,, | Performed by: ANESTHESIOLOGY

## 2023-06-05 PROCEDURE — 62323 NJX INTERLAMINAR LMBR/SAC: CPT | Mod: ,,, | Performed by: ANESTHESIOLOGY

## 2023-06-05 PROCEDURE — A4216 STERILE WATER/SALINE, 10 ML: HCPCS | Mod: PO | Performed by: ANESTHESIOLOGY

## 2023-06-05 PROCEDURE — 25500020 PHARM REV CODE 255: Mod: PO | Performed by: ANESTHESIOLOGY

## 2023-06-05 PROCEDURE — 63600175 PHARM REV CODE 636 W HCPCS: Mod: PO | Performed by: ANESTHESIOLOGY

## 2023-06-05 PROCEDURE — 76000 FLUOROSCOPY <1 HR PHYS/QHP: CPT | Mod: TC,PO

## 2023-06-05 RX ORDER — SODIUM CHLORIDE 9 MG/ML
INJECTION, SOLUTION INTRAMUSCULAR; INTRAVENOUS; SUBCUTANEOUS
Status: DISCONTINUED | OUTPATIENT
Start: 2023-06-05 | End: 2023-06-05 | Stop reason: HOSPADM

## 2023-06-05 RX ORDER — ALPRAZOLAM 0.5 MG/1
1 TABLET, ORALLY DISINTEGRATING ORAL ONCE AS NEEDED
Status: COMPLETED | OUTPATIENT
Start: 2023-06-05 | End: 2023-06-05

## 2023-06-05 RX ORDER — METHYLPREDNISOLONE ACETATE 80 MG/ML
INJECTION, SUSPENSION INTRA-ARTICULAR; INTRALESIONAL; INTRAMUSCULAR; SOFT TISSUE
Status: DISCONTINUED | OUTPATIENT
Start: 2023-06-05 | End: 2023-06-05 | Stop reason: HOSPADM

## 2023-06-05 RX ORDER — LIDOCAINE HYDROCHLORIDE 10 MG/ML
INJECTION, SOLUTION EPIDURAL; INFILTRATION; INTRACAUDAL; PERINEURAL
Status: DISCONTINUED | OUTPATIENT
Start: 2023-06-05 | End: 2023-06-05 | Stop reason: HOSPADM

## 2023-06-05 RX ADMIN — ALPRAZOLAM 0.5 MG: 0.5 TABLET, ORALLY DISINTEGRATING ORAL at 10:06

## 2023-06-05 NOTE — DISCHARGE SUMMARY
[facility]  Discharge Note  Short Stay    Admit Date: 6/5/2023    Discharge Date: 6/5/2023    Attending Physician: Yobani Nieves     Discharge Provider: Yobani Nieves    Diagnoses:  There are no hospital problems to display for this patient.      Discharged Condition: Good    Final Diagnoses: Lumbar radiculopathy [M54.16]    Disposition: Home or Self Care    Hospital Course: No complications, uneventful    Outcome of Hospitalization, Treatment, Procedure, or Surgery:  Patient was admitted for outpatient interventional pain management procedure. The patient tolerated the procedure well with no complications.    Follow up/Patient Instructions:  Follow up as scheduled in Pain Management office in 2-3 weeks.  Patient has received instructions and follow up date and time.    Medications:  Continue previous medications    Discharge Procedure Orders   Notify your health care provider if you experience any of the following:  temperature >100.4     Notify your health care provider if you experience any of the following:  persistent nausea and vomiting or diarrhea     Notify your health care provider if you experience any of the following:  severe uncontrolled pain     Notify your health care provider if you experience any of the following:  redness, tenderness, or signs of infection (pain, swelling, redness, odor or green/yellow discharge around incision site)     Notify your health care provider if you experience any of the following:  difficulty breathing or increased cough     Notify your health care provider if you experience any of the following:  severe persistent headache     Notify your health care provider if you experience any of the following:  worsening rash     Notify your health care provider if you experience any of the following:  persistent dizziness, light-headedness, or visual disturbances     Notify your health care provider if you experience any of the following:  increased confusion or  weakness     Activity as tolerated

## 2023-06-05 NOTE — INTERVAL H&P NOTE
The patient has been examined and the H&P has been reviewed:    I concur with the findings and no changes have occurred since H&P was written.  MRI reviewed. L3-L4 right paracentral disc extrusion superimposed upon more generalized disc bulge Moderate spinal canal stenosis with severe right and moderate left neuroforaminal narrowing.  We will proceed with L4-5 NATASHA. The risks and benefits of this intervention, and alternative therapies were discussed with the patient.  The discussion of risks included infection, bleeding, need for additional procedures or surgery, nerve damage.  Questions regarding the procedure, risks, expected outcome, and possible side effects were solicited and answered to the patient's satisfaction.  Safia Keen wishes to proceed with the injection or procedure.  Written consent was obtained.      There are no hospital problems to display for this patient.

## 2023-06-05 NOTE — OP NOTE
"Procedure Note    Procedure Date: 6/5/2023    Procedure Performed:  L4/5 lumbar interlaminar epidural steroid injection under fluoroscopy.    Indications:  Safia Ibarra presents with lumbar radiculitis/radiculopathy secondary to disc herniation, osteophyte/osteophyte complexes, and/or severe degenerative disc disease producing foraminal or central spinal stenosis.  The pain has been present for at least 4 weeks and the patient has failed to respond to noninvasive conservative care.  Pain rated by NRS at baseline prior to intervention is 9/10.  Their radiculitis/radiculopathy and/or neurogenic claudication is severe enough to greatly impact their quality of life or function.     Pre-op diagnosis: Lumbar Radiculopathy    Post-op diagnosis: same    Physician: Yobani Nieves MD    IV anxiolysis medications: none    Medications injected: depomedrol 80mg, 1% Lidocaine 1ml, 2 mL sterile, preservative-free normal saline.    Local anesthetic used: 1% Lidocaine, 1 ml    Estimated Blood Loss: none    Complications:  none    Technique:  The patient was interviewed in the holding area and Risks/Benefits were discussed, including, but not limited to, the possibility of new or different pain, bleeding or infection.   All questions were answered.  The patient understood and accepted risks.  Consent was verfied.  A time-out was taken to identify patient and procedure prior to starting the procedure. The patient was placed in the prone position on the fluoroscopy table. The area of the lumbar spine was prepped with Chloraprep x2 and draped in a sterile manner. The L4/5 interspace was identified and marked under AP fluoroscopy. The skin and subcutaneous tissues overlying the targeted interspace were anesthetized with 3-5 mL of 1% lidocaine using a 25G, 1.5" needle.  A 20G, 3.5" Tuohy epidural needle was directed toward the interspace under fluoroscopic guidance until the ligamentum flavum was engaged. From this point, a loss of " resistance technique with a glass syringe and saline was used to identify entrance of the needle into the epidural space. Once loss of resistance was observed 1 mL of contrast solution was injected. An appropriate epidurogram was noted.  A 4 mL mixture consisting of saline, 1 mL 1% Lidocaine and 80 mg of depomedrol was injected slowly and without resistance.  The needle was flushed with normal saline and removed. The contrast was seen to be displaced after injection. Patient was awake/responsive during all injections.  The patient tolerated the procedure well and was transferred to the [facility] in stable condition.  The patient was monitored after the procedure and was given post-procedure and discharge instructions to follow at home. The patient was discharged in a stable condition.

## 2023-06-06 VITALS
BODY MASS INDEX: 57.52 KG/M2 | WEIGHT: 293 LBS | RESPIRATION RATE: 18 BRPM | HEIGHT: 60 IN | OXYGEN SATURATION: 98 % | TEMPERATURE: 97 F | HEART RATE: 70 BPM | SYSTOLIC BLOOD PRESSURE: 145 MMHG | DIASTOLIC BLOOD PRESSURE: 70 MMHG

## 2023-06-19 ENCOUNTER — TELEPHONE (OUTPATIENT)
Dept: PAIN MEDICINE | Facility: CLINIC | Age: 68
End: 2023-06-19
Payer: MEDICARE

## 2023-06-19 NOTE — TELEPHONE ENCOUNTER
----- Message from Elisa Granado MA sent at 6/19/2023 11:44 AM CDT -----  Contact: self  Type:  Same Day Appointment Request    Caller is requesting a same day appointment.  Caller declined first available appointment listed below.      Name of Caller:  pt  When is the first available appointment?  N/a  Symptoms:  fall on back  Best Call Back Number:  [phone]   Additional Information:   please advise

## 2023-06-20 ENCOUNTER — OFFICE VISIT (OUTPATIENT)
Dept: PAIN MEDICINE | Facility: CLINIC | Age: 68
End: 2023-06-20
Payer: COMMERCIAL

## 2023-06-20 ENCOUNTER — TELEPHONE (OUTPATIENT)
Dept: PAIN MEDICINE | Facility: CLINIC | Age: 68
End: 2023-06-20

## 2023-06-20 ENCOUNTER — HOSPITAL ENCOUNTER (OUTPATIENT)
Dept: RADIOLOGY | Facility: HOSPITAL | Age: 68
Discharge: HOME | End: 2023-06-20
Payer: COMMERCIAL

## 2023-06-20 VITALS
DIASTOLIC BLOOD PRESSURE: 65 MMHG | SYSTOLIC BLOOD PRESSURE: 147 MMHG | HEIGHT: 60 IN | HEART RATE: 78 BPM | BODY MASS INDEX: 60.54 KG/M2

## 2023-06-20 DIAGNOSIS — M54.16 LUMBAR RADICULOPATHY: ICD-10-CM

## 2023-06-20 DIAGNOSIS — M54.16 LUMBAR RADICULOPATHY: Primary | ICD-10-CM

## 2023-06-20 DIAGNOSIS — S32.050A CLOSED COMPRESSION FRACTURE OF L5 LUMBAR VERTEBRA, INITIAL ENCOUNTER: Primary | ICD-10-CM

## 2023-06-20 DIAGNOSIS — M48.062 SPINAL STENOSIS OF LUMBAR REGION WITH NEUROGENIC CLAUDICATION: ICD-10-CM

## 2023-06-20 DIAGNOSIS — M54.9 DORSALGIA, UNSPECIFIED: ICD-10-CM

## 2023-06-20 DIAGNOSIS — M47.816 LUMBAR SPONDYLOSIS: ICD-10-CM

## 2023-06-20 PROCEDURE — 1125F AMNT PAIN NOTED PAIN PRSNT: CPT | Mod: CPTII,S$GLB,,

## 2023-06-20 PROCEDURE — 72110 XR LUMBAR SPINE COMPLETE 5 VIEW: ICD-10-PCS | Mod: 26,,, | Performed by: RADIOLOGY

## 2023-06-20 PROCEDURE — 3288F PR FALLS RISK ASSESSMENT DOCUMENTED: ICD-10-PCS | Mod: CPTII,S$GLB,,

## 2023-06-20 PROCEDURE — 3078F DIAST BP <80 MM HG: CPT | Mod: CPTII,S$GLB,,

## 2023-06-20 PROCEDURE — 3078F PR MOST RECENT DIASTOLIC BLOOD PRESSURE < 80 MM HG: ICD-10-PCS | Mod: CPTII,S$GLB,,

## 2023-06-20 PROCEDURE — 72110 X-RAY EXAM L-2 SPINE 4/>VWS: CPT | Mod: 26,,, | Performed by: RADIOLOGY

## 2023-06-20 PROCEDURE — 1100F PTFALLS ASSESS-DOCD GE2>/YR: CPT | Mod: CPTII,S$GLB,,

## 2023-06-20 PROCEDURE — 3008F BODY MASS INDEX DOCD: CPT | Mod: CPTII,S$GLB,,

## 2023-06-20 PROCEDURE — 3008F PR BODY MASS INDEX (BMI) DOCUMENTED: ICD-10-PCS | Mod: CPTII,S$GLB,,

## 2023-06-20 PROCEDURE — 1125F PR PAIN SEVERITY QUANTIFIED, PAIN PRESENT: ICD-10-PCS | Mod: CPTII,S$GLB,,

## 2023-06-20 PROCEDURE — 3288F FALL RISK ASSESSMENT DOCD: CPT | Mod: CPTII,S$GLB,,

## 2023-06-20 PROCEDURE — 3077F SYST BP >= 140 MM HG: CPT | Mod: CPTII,S$GLB,,

## 2023-06-20 PROCEDURE — 1159F PR MEDICATION LIST DOCUMENTED IN MEDICAL RECORD: ICD-10-PCS | Mod: CPTII,S$GLB,,

## 2023-06-20 PROCEDURE — 3077F PR MOST RECENT SYSTOLIC BLOOD PRESSURE >= 140 MM HG: ICD-10-PCS | Mod: CPTII,S$GLB,,

## 2023-06-20 PROCEDURE — 72110 X-RAY EXAM L-2 SPINE 4/>VWS: CPT | Mod: TC,PO

## 2023-06-20 PROCEDURE — 99999 PR PBB SHADOW E&M-EST. PATIENT-LVL III: ICD-10-PCS | Mod: PBBFAC,,,

## 2023-06-20 PROCEDURE — 1159F MED LIST DOCD IN RCRD: CPT | Mod: CPTII,S$GLB,,

## 2023-06-20 PROCEDURE — 99213 PR OFFICE/OUTPT VISIT, EST, LEVL III, 20-29 MIN: ICD-10-PCS | Mod: S$GLB,,,

## 2023-06-20 PROCEDURE — 99999 PR PBB SHADOW E&M-EST. PATIENT-LVL III: CPT | Mod: PBBFAC,,,

## 2023-06-20 PROCEDURE — 99213 OFFICE O/P EST LOW 20 MIN: CPT | Mod: S$GLB,,,

## 2023-06-20 PROCEDURE — 1100F PR PT FALLS ASSESS DOC 2+ FALLS/FALL W/INJURY/YR: ICD-10-PCS | Mod: CPTII,S$GLB,,

## 2023-06-20 RX ORDER — PREGABALIN 75 MG/1
75 CAPSULE ORAL 2 TIMES DAILY
Qty: 60 CAPSULE | Refills: 1 | Status: SHIPPED | OUTPATIENT
Start: 2023-06-20 | End: 2023-08-09

## 2023-06-20 RX ORDER — TRAMADOL HYDROCHLORIDE 50 MG/1
50 TABLET ORAL EVERY 8 HOURS PRN
Qty: 21 TABLET | Refills: 0 | Status: SHIPPED | OUTPATIENT
Start: 2023-06-20 | End: 2023-07-21 | Stop reason: SDUPTHER

## 2023-06-20 NOTE — TELEPHONE ENCOUNTER
X-ray today shows compression fracture at L5.  Order placed for LSO back brace.    Called and spoke with patient.  Discussed with her to wear back brace and take easy for the next couple weeks.    Patient verbalized understanding.

## 2023-06-20 NOTE — PROGRESS NOTES
[facility] Follow Up Evaluation    Referred by: Dr. Singletary  Reason for referral: back pain    CC:   Chief Complaint   Patient presents with    Back Pain    Leg Pain      Last 3 PDI Scores 9/9/2022   Pain Disability Index (PDI) 41     Interval HPI 6/20/2023: Safia Ibarra returns to the office for follow up.  She is s/p L4/5 NATASHA on 06/05/2023 with initially 80% relief.  Following the epidural she fell at her residence and has had worsening pain, 10/10, across her lower back with radiation down bilateral legs.  Pain down her legs is worsened with standing and walking, somewhat alleviated with rest.  She denies any new numbness or liliana weakness.  She is now using a Rollator at her house due to pain related weakness.  She denies any new changes to her bowel bladder function.  She continues to take Lyrica 75 mg nightly and tramadol on average twice daily with some relief.      Pain intervention history:  - s/p L5-S1 NATASHA on 1/3/22 with 80% relief   - s/p L5-S1 NATASHA on 9/21/22 with 70% relief lasting for over 5 months  - s/p L5/S1 NATASHA on 4/26/23 without relief  - s/p L4/5 NATASHA on 06/05/2023 with initially 80% relief.    HPI:   Safia Ibarra is a 68 y.o. female who complains of back pain    Onset: 6-7 months  Inciting Event: none  Progression: since onset, pain is gradually worsening  Current Pain Score: 6/10  Typical Range: 2-10/10  Timing: constant  Quality: shooting  Radiation: yes, down the back both legs  Associated numbness or weakness: yes numbness   Exacerbated by: walking  Allievated by: laying down  Is Pain Level Acceptable?: No    Previous Therapies:  PT/OT: yes  HEP: yes  Interventions:   Surgery:  Medications:   - NSAIDS:   - MSK Relaxants:   - TCAs:   - SNRIs:   - Topicals:   - Anticonvulsants:  - Opioids:     History:    Current Outpatient Medications:     ALPRAZolam (XANAX) 0.25 MG tablet, Take 0.25 mg by mouth daily as needed., Disp: , Rfl:     amLODIPine (NORVASC) 5 MG tablet, Take 5  mg by mouth once daily., Disp: , Rfl:     diclofenac (VOLTAREN) 50 MG EC tablet, Take by mouth., Disp: , Rfl:     fesoterodine (TOVIAZ) 4 mg Tb24, Take 4 mg by mouth every evening., Disp: , Rfl:     levothyroxine (SYNTHROID, LEVOTHROID) 175 MCG tablet, , Disp: , Rfl:     meloxicam (MOBIC) 15 MG tablet, Take 15 mg by mouth daily as needed., Disp: , Rfl:     nitroGLYCERIN (NITROSTAT) 0.4 MG SL tablet, For CHEST PAIN: Put 1 tablet under tongue every 5 minutes for 3 doses as needed. If no relief, GO TO ER., Disp: 25 tablet, Rfl: 0    nystatin (MYCOSTATIN) cream, 3 (three) times daily. Apply to affected area, Disp: , Rfl:     ondansetron (ZOFRAN-ODT) 4 MG TbDL, 4 mg every 6 (six) hours as needed., Disp: , Rfl:     pregabalin (LYRICA) 75 MG capsule, Take 1 capsule (75 mg total) by mouth 2 (two) times daily., Disp: 60 capsule, Rfl: 1    rosuvastatin (CRESTOR) 10 MG tablet, Take 10 mg by mouth nightly., Disp: , Rfl:     tiZANidine (ZANAFLEX) 4 MG tablet, Take 4 mg by mouth every 8 (eight) hours as needed., Disp: , Rfl:     traMADoL (ULTRAM) 50 mg tablet, Take 1 tablet (50 mg total) by mouth every 8 (eight) hours as needed for Pain., Disp: 21 tablet, Rfl: 0    Past Medical History:   Diagnosis Date    HTN (hypertension)     Hyperlipidemia     Hypothyroidism     Osteoporosis        Past Surgical History:   Procedure Laterality Date    BREAST BIOPSY Left     core Dr. Hannah benign over 5 yrs ago    EPIDURAL STEROID INJECTION INTO LUMBAR SPINE N/A 1/3/2022    Procedure: Injection-steroid-epidural-lumbar L5/S1;  Surgeon: Yobani Nieves MD;  Location: [facility] OR;  Service: Pain Management;  Laterality: N/A;    EPIDURAL STEROID INJECTION INTO LUMBAR SPINE N/A 9/21/2022    Procedure: Injection-steroid-epidural-lumbar L5/S1;  Surgeon: Yobani Nieves MD;  Location: [facility] OR;  Service: Pain Management;  Laterality: N/A;    EPIDURAL STEROID INJECTION INTO LUMBAR SPINE N/A 4/26/2023    Procedure: Injection-steroid-epidural-lumbar L5/S1;   Surgeon: Yobani Nieves MD;  Location: [facility] OR;  Service: Pain Management;  Laterality: N/A;    EPIDURAL STEROID INJECTION INTO LUMBAR SPINE N/A 6/5/2023    Procedure: Injection-steroid-epidural-lumbar L4/L5;  Surgeon: Yobani Nieves MD;  Location: [facility] OR;  Service: Pain Management;  Laterality: N/A;    HYSTERECTOMY         Family History   Problem Relation Age of Onset    Uterine cancer Mother     Breast cancer Paternal Aunt        Social History     Socioeconomic History    Marital status:    Tobacco Use    Smoking status: Never    Smokeless tobacco: Never       Review of patient's allergies indicates:   Allergen Reactions    Bactrim [sulfamethoxazole-trimethoprim] Itching       Review of Systems:  General ROS: negative for - fever  Psychological ROS: negative for - hostility  Hematological and Lymphatic ROS: negative for - bleeding problems  Endocrine ROS: negative for - unexpected weight changes  Respiratory ROS: no cough, shortness of breath, or wheezing  Cardiovascular ROS: no chest pain or dyspnea on exertion  Gastrointestinal ROS: no abdominal pain, change in bowel habits, or black or bloody stools  Musculoskeletal ROS: negative for - muscular weakness  Neurological ROS: positive for - numbness/tingling  Dermatological ROS: negative for rash    Physical Exam:  Vitals:    06/20/23 0830   BP: (!) 147/65   Pulse: 78   Height: 5' (1.524 m)   PainSc: 10-Worst pain ever   PainLoc: Back     Body mass index is 60.54 kg/m².     Gen: NAD  Gait:  Presents in wheelchair.  Can ambulate on own.  Psych:  Mood appropriate for given condition  HEENT: eyes anicteric   GI: Abd soft  CV: RRR  Lungs: breathing unlabored   ROM: limited AROM of the L spine in all planes, full ROM at ankles, knees and hips    Sensation: intact to light touch in all dermatomes tested from L2-S1 bilaterally  Reflexes:   Palpation: Diffusely tender over lumbar paraspinals   Tone: normal in the b/l knees and hips   Skin: intact  Extremities:  No edema in b/l ankles or hands  Provacative tests:  No severe tenderness to brisk palpation of midline spine       Right Left   L2/3 Iliacus Hip flexion  5  5   L3/4 Qudratus Femoris Knee Extension  5  5   L4/5 Tib Anterior Ankle Dorsiflexion   5  5   L5/S1 Extensor Hallicus Longus Great toe extension  5  5                 S1/S2 Gastroc/Soleus Plantar Flexion  5  5       Imaging:  MRI lumbar spine 11/15/21  FINDINGS  There is a minor curvature in the lumbar region convex to the patient's left.  There are hypertrophic facet changes with facet arthropathy throughout the lumbar spine.  There is disc space narrowing primarily at L1-L2, L2-L3 and L3-L4.  There is generally stenotic changes in part on a developmental basis throughout the lumbosacral spine with other factors present as well.  The distal cord terminates at L1-L2 and appears unremarkable.    At L1-L2, there is mild to moderate lumbar stenosis present.  Hypertrophic facet ligamentous changes as well as a broad-based disc protrusion measured in the AP dimension up to 4.8 mm.  There is mild foraminal restriction bilaterally without root contact.  At L2-L3, there is moderate lumbar stenosis present more prominently paracentrally to the right where there is a prominent disc protrusion/extrusion measured up to 6.6 mm likely compressing the traversing nerve root on the right.  There is mild foraminal restriction bilaterally without root contact within the neural foramina.  At L3-L4, there is moderate lumbar stenosis produced by broad based disc protrusion measured up to 5.4 mm in the AP dimension.  Hypertrophic facet ligamentous changes contribute to the lumbar stenosis.  There is foraminal restriction bilaterally more so on the right where there does appear to be contact of the exiting nerve root in the right foramen.  At L4-L5,  there is moderate to marked lumbar stenosis produced by hypertrophic facet and hypertrophic ligamentous changes.  There is a  broad-based disc protrusion measured up to 3.9 mm in the AP dimension.  There is foraminal restriction bilaterally without definite root contact within the neural foramina.  At L5-S1,  there is a right paracentral disc protrusion measured up to 3.6 mm which may contact the traversing nerve root on the right.  There is no significant central stenosis.  There is foraminal restriction bilaterally.  There does appear to be contact of the exiting nerve root along its inferior margin in the left foramen.     MRI lumbar spine 05/13/2023  FINDINGS:  Alignment: Some rightward convexity of the lumbar spine noted.  There is slight grade 1 anterolisthesis of L4 on L5.     Vertebrae: Multiple small Schmorl's nodes and mild multilevel degenerative endplate changes are present.  Vertebral body heights are otherwise within normal limits.  No evidence of fracture or marrow replacement process.     Discs: There is generalized disc desiccation throughout the lumbar spine with moderate disc height loss at L2-3 and L3-4.  Suspect mild disc height loss at L1-2.     Cord: Normal.  Conus terminates at L2.     Degenerative findings:     T12-L1: Minimal posterior disc bulge. No spinal canal or neuroforaminal stenosis.  L1-L2: Generalized disc bulge, slightly asymmetric to left.  Thickening of the ligamentum flavum.  Mild spinal canal stenosis with severe left and moderate right neuroforaminal narrowing.  L2-L3: Generalized disc bulge, asymmetric to the right.  Thickening of the ligamentum flavum.  Mild-to-moderate spinal canal stenosis with severe right and moderate left neuroforaminal narrowing.  L3-L4: Right paracentral disc extrusion superimposed upon more generalized disc bulge.  Mild bilateral facet arthropathy and ligamentum flavum thickening.  Moderate spinal canal stenosis with severe right and moderate left neuroforaminal narrowing.  L4-L5: Severe bilateral facet arthropathy.  Broad-based disc bulge/uncovering of the intervertebral  disc and thickening of the ligamentum flavum.  Moderate spinal canal stenosis with moderate right and mild-to-moderate left neural foraminal narrowing.  L5-S1: Severe bilateral facet arthropathy.  Generalized disc bulge.  Mild crowding of the right lateral recess, otherwise no spinal canal stenosis.  Moderate left and mild right neuroforaminal narrowing.     Paraspinal muscles & soft tissues: Unremarkable.     Impression:     1. Degenerative disc disease and facet arthropathy contributing to multilevel spinal canal stenosis and neuroforaminal narrowing as above  2. Slight grade 1 anterolisthesis of L4 on L5 secondary to facet arthropathy.    Labs:  BMP  No results found for: NA, K, CL, CO2, BUN, CREATININE, CALCIUM, ANIONGAP, ESTGFRAFRICA, EGFRNONAA  No results found for: ALT, AST, GGT, ALKPHOS, BILITOT    Assessment:   Problem List Items Addressed This Visit    None  Visit Diagnoses       Lumbar radiculopathy    -  Primary    Dorsalgia, unspecified        Relevant Orders    X-Ray Lumbar Spine 5 View    Spinal stenosis of lumbar region with neurogenic claudication        Lumbar spondylosis                    68 y.o. year old female presents to the office with back pain, constant, 6/10, radiating down the back of both legs.  + numbness, no weakness.    6/20/2023: Safia Ibarra returns to the office for follow up.  She is s/p L4/5 NATASHA on 06/05/2023 with initially 80% relief.  Following the epidural she fell at her residence and has had worsening pain, 10/10, across her lower back with radiation down bilateral legs.  Pain down her legs is worsened with standing and walking, somewhat alleviated with rest.  She denies any new numbness or liliana weakness.  She is now using a Rollator at her house due to pain related weakness.  She denies any new changes to her bowel bladder function.  She continues to take Lyrica 75 mg nightly and tramadol on average twice daily with some relief.    - on exam she she is full  strength in her lower extremities.  No significant tenderness to palpation of midline spine.  - She is s/p L4/5 NATASHA on 06/05/2023 with initially 80% relief.  - I independently reviewed her updated lumbar MRI is consistent with L2-L3: Mild-to-moderate spinal canal stenosis with severe right and moderate left neuroforaminal narrowing.  L3-L4: Right paracentral disc extrusion superimposed upon more generalized disc bulge.  Moderate spinal canal stenosis with severe right and moderate left neuroforaminal narrowing.  Multilevel bilateral severe facet arthropathy worse at the lower 2 levels.  - she initially felt great relief with the epidural however after falling her pain has worsened.  - I have low suspicion for a compression fracture however I would like the order an updated x-ray to rule out.    - at this time I think we need to maximize conservative care with rest, formal PT and medications.   - she is reporting due to worsening pain she is taking tramadol 50 mg on average twice daily.  She is also taking Lyrica 75 mg nightly.  - refills for tramadol 50 mg and Lyrica sent to Dr. Nieves today for approval.  - discussed with her to increase Lyrica twice daily to see how she tolerates.  She is currently having no ill side effects with Lyrica nightly.  - follow up as needed.  We will call her with results of lumbar x-ray if there are any acute changes.      :  Reviewed

## 2023-06-21 NOTE — TELEPHONE ENCOUNTER
Called to inform her that once her back brace comes in they will give her a call to come in and get fitted for it. I also scheduled an 2 week f/u.

## 2023-06-30 ENCOUNTER — PATIENT MESSAGE (OUTPATIENT)
Dept: NEUROSURGERY | Facility: CLINIC | Age: 68
End: 2023-06-30
Payer: MEDICARE

## 2023-06-30 DIAGNOSIS — S32.050A CLOSED COMPRESSION FRACTURE OF L5 VERTEBRA, INITIAL ENCOUNTER: Primary | ICD-10-CM

## 2023-07-20 ENCOUNTER — OFFICE VISIT (OUTPATIENT)
Dept: NEUROSURGERY | Facility: CLINIC | Age: 68
End: 2023-07-20
Payer: COMMERCIAL

## 2023-07-20 VITALS
WEIGHT: 293 LBS | HEART RATE: 84 BPM | BODY MASS INDEX: 57.52 KG/M2 | HEIGHT: 60 IN | DIASTOLIC BLOOD PRESSURE: 72 MMHG | SYSTOLIC BLOOD PRESSURE: 149 MMHG | RESPIRATION RATE: 18 BRPM

## 2023-07-20 DIAGNOSIS — M51.36 DDD (DEGENERATIVE DISC DISEASE), LUMBAR: ICD-10-CM

## 2023-07-20 DIAGNOSIS — M54.50 LOW BACK PAIN WITHOUT SCIATICA, UNSPECIFIED BACK PAIN LATERALITY, UNSPECIFIED CHRONICITY: Primary | ICD-10-CM

## 2023-07-20 DIAGNOSIS — M41.50 SCOLIOSIS DUE TO DEGENERATIVE DISEASE OF SPINE IN ADULT PATIENT: ICD-10-CM

## 2023-07-20 DIAGNOSIS — M43.16 SPONDYLOLISTHESIS OF LUMBAR REGION: ICD-10-CM

## 2023-07-20 PROCEDURE — 1160F PR REVIEW ALL MEDS BY PRESCRIBER/CLIN PHARMACIST DOCUMENTED: ICD-10-PCS | Mod: CPTII,S$GLB,, | Performed by: STUDENT IN AN ORGANIZED HEALTH CARE EDUCATION/TRAINING PROGRAM

## 2023-07-20 PROCEDURE — 1100F PTFALLS ASSESS-DOCD GE2>/YR: CPT | Mod: CPTII,S$GLB,, | Performed by: STUDENT IN AN ORGANIZED HEALTH CARE EDUCATION/TRAINING PROGRAM

## 2023-07-20 PROCEDURE — 3078F PR MOST RECENT DIASTOLIC BLOOD PRESSURE < 80 MM HG: ICD-10-PCS | Mod: CPTII,S$GLB,, | Performed by: STUDENT IN AN ORGANIZED HEALTH CARE EDUCATION/TRAINING PROGRAM

## 2023-07-20 PROCEDURE — 3008F BODY MASS INDEX DOCD: CPT | Mod: CPTII,S$GLB,, | Performed by: STUDENT IN AN ORGANIZED HEALTH CARE EDUCATION/TRAINING PROGRAM

## 2023-07-20 PROCEDURE — 1125F AMNT PAIN NOTED PAIN PRSNT: CPT | Mod: CPTII,S$GLB,, | Performed by: STUDENT IN AN ORGANIZED HEALTH CARE EDUCATION/TRAINING PROGRAM

## 2023-07-20 PROCEDURE — 3077F PR MOST RECENT SYSTOLIC BLOOD PRESSURE >= 140 MM HG: ICD-10-PCS | Mod: CPTII,S$GLB,, | Performed by: STUDENT IN AN ORGANIZED HEALTH CARE EDUCATION/TRAINING PROGRAM

## 2023-07-20 PROCEDURE — 3008F PR BODY MASS INDEX (BMI) DOCUMENTED: ICD-10-PCS | Mod: CPTII,S$GLB,, | Performed by: STUDENT IN AN ORGANIZED HEALTH CARE EDUCATION/TRAINING PROGRAM

## 2023-07-20 PROCEDURE — 99215 PR OFFICE/OUTPT VISIT, EST, LEVL V, 40-54 MIN: ICD-10-PCS | Mod: S$GLB,,, | Performed by: STUDENT IN AN ORGANIZED HEALTH CARE EDUCATION/TRAINING PROGRAM

## 2023-07-20 PROCEDURE — 3077F SYST BP >= 140 MM HG: CPT | Mod: CPTII,S$GLB,, | Performed by: STUDENT IN AN ORGANIZED HEALTH CARE EDUCATION/TRAINING PROGRAM

## 2023-07-20 PROCEDURE — 3078F DIAST BP <80 MM HG: CPT | Mod: CPTII,S$GLB,, | Performed by: STUDENT IN AN ORGANIZED HEALTH CARE EDUCATION/TRAINING PROGRAM

## 2023-07-20 PROCEDURE — 1125F PR PAIN SEVERITY QUANTIFIED, PAIN PRESENT: ICD-10-PCS | Mod: CPTII,S$GLB,, | Performed by: STUDENT IN AN ORGANIZED HEALTH CARE EDUCATION/TRAINING PROGRAM

## 2023-07-20 PROCEDURE — 3288F FALL RISK ASSESSMENT DOCD: CPT | Mod: CPTII,S$GLB,, | Performed by: STUDENT IN AN ORGANIZED HEALTH CARE EDUCATION/TRAINING PROGRAM

## 2023-07-20 PROCEDURE — 1160F RVW MEDS BY RX/DR IN RCRD: CPT | Mod: CPTII,S$GLB,, | Performed by: STUDENT IN AN ORGANIZED HEALTH CARE EDUCATION/TRAINING PROGRAM

## 2023-07-20 PROCEDURE — 3288F PR FALLS RISK ASSESSMENT DOCUMENTED: ICD-10-PCS | Mod: CPTII,S$GLB,, | Performed by: STUDENT IN AN ORGANIZED HEALTH CARE EDUCATION/TRAINING PROGRAM

## 2023-07-20 PROCEDURE — 1159F PR MEDICATION LIST DOCUMENTED IN MEDICAL RECORD: ICD-10-PCS | Mod: CPTII,S$GLB,, | Performed by: STUDENT IN AN ORGANIZED HEALTH CARE EDUCATION/TRAINING PROGRAM

## 2023-07-20 PROCEDURE — 1100F PR PT FALLS ASSESS DOC 2+ FALLS/FALL W/INJURY/YR: ICD-10-PCS | Mod: CPTII,S$GLB,, | Performed by: STUDENT IN AN ORGANIZED HEALTH CARE EDUCATION/TRAINING PROGRAM

## 2023-07-20 PROCEDURE — 1159F MED LIST DOCD IN RCRD: CPT | Mod: CPTII,S$GLB,, | Performed by: STUDENT IN AN ORGANIZED HEALTH CARE EDUCATION/TRAINING PROGRAM

## 2023-07-20 PROCEDURE — 99215 OFFICE O/P EST HI 40 MIN: CPT | Mod: S$GLB,,, | Performed by: STUDENT IN AN ORGANIZED HEALTH CARE EDUCATION/TRAINING PROGRAM

## 2023-07-20 NOTE — PROGRESS NOTES
[facility] Neurosurgery  Clinic Consult     Consult Requested By: No ref. provider found  PCP: Marcelino Bullard MD    SUBJECTIVE:     Chief Complaint:   Chief Complaint   Patient presents with    Recent Fall     Patient present to clinic today as recent fall    Was doing well per pt and daughter  Has a fall on to rib and right side, getting better  Did great with last NATASHA with Dr. Nieves    Now recovering but residual right leg pain and rib pain getting better   Known complex spine with degen scoli and L2-S1 stenosis, 4-5 spondy  Symptomatic     Sitting strength is normal   Walking with RW    Mild , stable L5 superior end plate compression fx      History of Present Illness:  Safia Ibarra is a 68 y.o. female with HTN, HLD, hypothyroidism, osteoporosis who presents for evaluation of low back pain. The pain is present in the low back and is constant. She has pain and numbness in the left anterior thigh. The pain is worse with ambulation. She states she cannot breathe due to the pain in her back. She did get some relief from steroids. She was unable to attend PT. She has not received injections with pain management.     Pertinent and recent history, provider evaluations, imaging and data reviewed in EPIC        Past Medical History:   Diagnosis Date    HTN (hypertension)     Hyperlipidemia     Hypothyroidism     Osteoporosis      Past Surgical History:   Procedure Laterality Date    BREAST BIOPSY Left     core Dr. Hannah benign over 5 yrs ago    EPIDURAL STEROID INJECTION INTO LUMBAR SPINE N/A 1/3/2022    Procedure: Injection-steroid-epidural-lumbar L5/S1;  Surgeon: Yobani Nieves MD;  Location: [facility] OR;  Service: Pain Management;  Laterality: N/A;    EPIDURAL STEROID INJECTION INTO LUMBAR SPINE N/A 9/21/2022    Procedure: Injection-steroid-epidural-lumbar L5/S1;  Surgeon: Yobani Nieves MD;  Location: [facility] OR;  Service: Pain Management;  Laterality: N/A;    EPIDURAL STEROID INJECTION INTO LUMBAR SPINE N/A  4/26/2023    Procedure: Injection-steroid-epidural-lumbar L5/S1;  Surgeon: Yobani Nieves MD;  Location: [facility] OR;  Service: Pain Management;  Laterality: N/A;    EPIDURAL STEROID INJECTION INTO LUMBAR SPINE N/A 6/5/2023    Procedure: Injection-steroid-epidural-lumbar L4/L5;  Surgeon: Yobani Nieves MD;  Location: [facility] OR;  Service: Pain Management;  Laterality: N/A;    HYSTERECTOMY       Family History   Problem Relation Age of Onset    Uterine cancer Mother     Breast cancer Paternal Aunt      Social History     Tobacco Use    Smoking status: Never    Smokeless tobacco: Never      Review of patient's allergies indicates:   Allergen Reactions    Bactrim [sulfamethoxazole-trimethoprim] Itching       Current Outpatient Medications:     ALPRAZolam (XANAX) 0.25 MG tablet, Take 0.25 mg by mouth daily as needed., Disp: , Rfl:     amLODIPine (NORVASC) 5 MG tablet, Take 5 mg by mouth once daily., Disp: , Rfl:     levothyroxine (SYNTHROID, LEVOTHROID) 175 MCG tablet, , Disp: , Rfl:     meloxicam (MOBIC) 15 MG tablet, Take 15 mg by mouth daily as needed., Disp: , Rfl:     nitroGLYCERIN (NITROSTAT) 0.4 MG SL tablet, For CHEST PAIN: Put 1 tablet under tongue every 5 minutes for 3 doses as needed. If no relief, GO TO ER., Disp: 25 tablet, Rfl: 0    nystatin (MYCOSTATIN) cream, 3 (three) times daily. Apply to affected area, Disp: , Rfl:     ondansetron (ZOFRAN-ODT) 4 MG TbDL, 4 mg every 6 (six) hours as needed., Disp: , Rfl:     rosuvastatin (CRESTOR) 10 MG tablet, Take 10 mg by mouth nightly., Disp: , Rfl:     traMADoL (ULTRAM) 50 mg tablet, Take 1 tablet (50 mg total) by mouth every 8 (eight) hours as needed for Pain., Disp: 21 tablet, Rfl: 0    diclofenac (VOLTAREN) 50 MG EC tablet, Take by mouth., Disp: , Rfl:     fesoterodine (TOVIAZ) 4 mg Tb24, Take 4 mg by mouth every evening., Disp: , Rfl:     pregabalin (LYRICA) 75 MG capsule, Take 1 capsule (75 mg total) by mouth 2 (two) times daily., Disp: 60 capsule, Rfl: 1     tiZANidine (ZANAFLEX) 4 MG tablet, Take 4 mg by mouth every 8 (eight) hours as needed., Disp: , Rfl:     Review of Systems:   Constitutional: no fever, chills or night sweats. No changes in weight   Eyes: no visual changes   ENT: no nasal congestion or sore throat   Respiratory: no cough or shortness of breath   Cardiovascular: no chest pain or palpitations   Gastrointestinal: no nausea or vomiting   Genitourinary: no hematuria or dysuria   Integument/Breast: no rash or pruritis   Hematologic/Lymphatic: no easy bruising or lymphadenopathy   Musculoskeletal: +back pain  Neurological: no seizures or tremors +numbness   Behavioral/Psych: no auditory or visual hallucinations   Endocrine: no heat or cold intolerance         OBJECTIVE:     Vital Signs (Most Recent):  Pulse: 84 (07/20/23 1339)  Resp: 18 (07/20/23 1339)  BP: (!) 149/72 (07/20/23 1339)  Estimated body mass index is 59.57 kg/m² as calculated from the following:    Height as of this encounter: 5' (1.524 m).    Weight as of this encounter: 138.3 kg (305 lb).    Physical Exam:   General: well developed, well nourished, no distress.   Neurologic: Alert and oriented. Thought content appropriate. GCS 15.   Language: No aphasia  Speech: No dysarthria  Head: normocephalic, atraumatic  Eyes: EOMI.  Neck: trachea midline, no JVD   Cardiovascular: no LE edema  Pulmonary: normal respirations, no signs of respiratory distress  Abdomen: non-distended  Sensory: diminished sensation left anterior thigh   Skin: Skin is warm, dry and intact.    Motor Strength: Moves all extremities spontaneously with good tone. No abnormal movements seen.     Strength  Deltoids Triceps Biceps Wrist Extension Wrist Flexion Hand  Interossei   Upper: R 5/5 5/5 5/5 5/5 5/5 5/5 5/5    L 5/5 5/5 5/5 5/5 5/5 5/5 5/5     Iliopsoas Quadriceps Knee  Flexion Tibialis  anterior Gastro- cnemius EHL    Lower: R 5/5 5/5 5/5 5/5 5/5 5/5     L 5/5 5/5 5/5 5/5 5/5 5/5      DTR's: 1+  Clonus:  absent       Diagnostic Results:  I have independently reviewed the following imaging:  MRI: Reviewed  r    11/15/2021    There is multilevel degeneration from the lower thoracic to lumbosacral spine.  Eleven 12 degenerative disc disease, L1-2 disc osteophyte complex with moderate circumferential stenosis, L2-3 degenerative disc disease broad-based disc bulge moderate central bilateral foraminal stenosis  L3-4 degenerative disc disease broad-based disc osteophyte complex moderate central bilateral foraminal stenosis,   L4-5 facet hypertrophy moderate to severe central lateral recess and bilateral foraminal stenosis  L5-S1 facet arthropathy disc osteophyte complex right lateral recess proximal foraminal stenosis  Narrative & Impression  EXAMINATION:  MRI LUMBAR SPINE WITHOUT CONTRAST     CLINICAL HISTORY:  Low back pain, symptoms persist with > 6wks conservative treatment; Dorsalgia, unspecified     TECHNIQUE:  Multiplanar, multisequence MR images were acquired from the thoracolumbar junction to the sacrum without the administration of contrast.     COMPARISON:  Radiographs dated 09/09/2022     FINDINGS:  Alignment: Some rightward convexity of the lumbar spine noted.  There is slight grade 1 anterolisthesis of L4 on L5.     Vertebrae: Multiple small Schmorl's nodes and mild multilevel degenerative endplate changes are present.  Vertebral body heights are otherwise within normal limits.  No evidence of fracture or marrow replacement process.     Discs: There is generalized disc desiccation throughout the lumbar spine with moderate disc height loss at L2-3 and L3-4.  Suspect mild disc height loss at L1-2.     Cord: Normal.  Conus terminates at L2.     Degenerative findings:     T12-L1: Minimal posterior disc bulge. No spinal canal or neuroforaminal stenosis.     L1-L2: Generalized disc bulge, slightly asymmetric to left.  Thickening of the ligamentum flavum.  Mild spinal canal stenosis with severe left and moderate  right neuroforaminal narrowing.     L2-L3: Generalized disc bulge, asymmetric to the right.  Thickening of the ligamentum flavum.  Mild-to-moderate spinal canal stenosis with severe right and moderate left neuroforaminal narrowing.     L3-L4: Right paracentral disc extrusion superimposed upon more generalized disc bulge.  Mild bilateral facet arthropathy and ligamentum flavum thickening.  Moderate spinal canal stenosis with severe right and moderate left neuroforaminal narrowing.     L4-L5: Severe bilateral facet arthropathy.  Broad-based disc bulge/uncovering of the intervertebral disc and thickening of the ligamentum flavum.  Moderate spinal canal stenosis with moderate right and mild-to-moderate left neural foraminal narrowing.     L5-S1: Severe bilateral facet arthropathy.  Generalized disc bulge.  Mild crowding of the right lateral recess, otherwise no spinal canal stenosis.  Moderate left and mild right neuroforaminal narrowing.     Paraspinal muscles & soft tissues: Unremarkable.     Impression:     1. Degenerative disc disease and facet arthropathy contributing to multilevel spinal canal stenosis and neuroforaminal narrowing as above  2. Slight grade 1 anterolisthesis of L4 on L5 secondary to facet arthropathy.        Electronically signed by: Natan Mora MD  Date:                                            05/13/2023  Time:                                           15:56           Exam Ended: 05/13/23 10:21 Last Resulted: 05/13/23 15:56                   ASSESSMENT/PLAN:     Low back pain without sciatica, unspecified back pain laterality, unspecified chronicity  -     Ambulatory referral/consult to Physical/Occupational Therapy; Future; Expected date: 07/27/2023    DDD (degenerative disc disease), lumbar    Spondylolisthesis of lumbar region    Scoliosis due to degenerative disease of spine in adult patient        Safia Ibarra is a 68 y.o. female  Very nice patient  With, back pain and  radiculopathy, ambulates independently high frequency and severity of symptoms  Very complex multilevel severe degeneration facet arthropathy disc osteophyte complex degenerative disc disease throughout her upper thoracic and more severe throughout her lumbar spine from L1-S1; she has pain is neurologically intact.  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 Encouraged to call with any questions or concerns.     This note was partially dictated using voice recognition software, so please excuse any errors that were not corrected.

## 2023-07-21 DIAGNOSIS — M54.16 LUMBAR RADICULOPATHY: ICD-10-CM

## 2023-07-21 NOTE — TELEPHONE ENCOUNTER
----- Message from Lizett Enamorado LPN sent at 7/20/2023  2:13 PM CDT -----  Was seen in our office and requesting refill on medication.

## 2023-07-24 ENCOUNTER — PATIENT MESSAGE (OUTPATIENT)
Dept: PAIN MEDICINE | Facility: CLINIC | Age: 68
End: 2023-07-24
Payer: MEDICARE

## 2023-07-24 ENCOUNTER — PATIENT MESSAGE (OUTPATIENT)
Dept: NEUROSURGERY | Facility: CLINIC | Age: 68
End: 2023-07-24
Payer: MEDICARE

## 2023-07-24 RX ORDER — TRAMADOL HYDROCHLORIDE 50 MG/1
50 TABLET ORAL EVERY 8 HOURS PRN
Qty: 21 TABLET | Refills: 0 | Status: SHIPPED | OUTPATIENT
Start: 2023-07-24 | End: 2023-08-29 | Stop reason: SDUPTHER

## 2023-07-24 NOTE — TELEPHONE ENCOUNTER
reviewed.  Refill sent.    Please get this patient a follow-up with me in the office within the next 2 weeks.  Okay to double book on my schedule.

## 2023-07-25 NOTE — TELEPHONE ENCOUNTER
Called to schedule with pt but they are not aware of their availability - will call 7/26 again to check back with pt once they have a better idea of their availability.   04-Feb-2022 12:08

## 2023-08-04 ENCOUNTER — OFFICE VISIT (OUTPATIENT)
Dept: NEUROSURGERY | Facility: CLINIC | Age: 68
End: 2023-08-04
Payer: COMMERCIAL

## 2023-08-04 ENCOUNTER — HOSPITAL ENCOUNTER (OUTPATIENT)
Dept: RADIOLOGY | Facility: HOSPITAL | Age: 68
Discharge: HOME | End: 2023-08-04
Attending: PHYSICIAN ASSISTANT
Payer: COMMERCIAL

## 2023-08-04 VITALS — WEIGHT: 293 LBS | BODY MASS INDEX: 57.52 KG/M2 | HEIGHT: 60 IN | RESPIRATION RATE: 18 BRPM

## 2023-08-04 DIAGNOSIS — M41.50 SCOLIOSIS DUE TO DEGENERATIVE DISEASE OF SPINE IN ADULT PATIENT: Primary | ICD-10-CM

## 2023-08-04 DIAGNOSIS — S32.050A CLOSED COMPRESSION FRACTURE OF L5 VERTEBRA, INITIAL ENCOUNTER: ICD-10-CM

## 2023-08-04 DIAGNOSIS — S32.050A COMPRESSION FRACTURE OF L5 VERTEBRA, INITIAL ENCOUNTER: ICD-10-CM

## 2023-08-04 DIAGNOSIS — M43.16 SPONDYLOLISTHESIS OF LUMBAR REGION: ICD-10-CM

## 2023-08-04 PROCEDURE — 72100 XR LUMBAR SPINE AP AND LATERAL: ICD-10-PCS | Mod: 26,,, | Performed by: RADIOLOGY

## 2023-08-04 PROCEDURE — 1160F RVW MEDS BY RX/DR IN RCRD: CPT | Mod: CPTII,S$GLB,, | Performed by: PHYSICIAN ASSISTANT

## 2023-08-04 PROCEDURE — 3288F PR FALLS RISK ASSESSMENT DOCUMENTED: ICD-10-PCS | Mod: CPTII,S$GLB,, | Performed by: PHYSICIAN ASSISTANT

## 2023-08-04 PROCEDURE — 1159F MED LIST DOCD IN RCRD: CPT | Mod: CPTII,S$GLB,, | Performed by: PHYSICIAN ASSISTANT

## 2023-08-04 PROCEDURE — 3008F BODY MASS INDEX DOCD: CPT | Mod: CPTII,S$GLB,, | Performed by: PHYSICIAN ASSISTANT

## 2023-08-04 PROCEDURE — 3288F FALL RISK ASSESSMENT DOCD: CPT | Mod: CPTII,S$GLB,, | Performed by: PHYSICIAN ASSISTANT

## 2023-08-04 PROCEDURE — 99213 PR OFFICE/OUTPT VISIT, EST, LEVL III, 20-29 MIN: ICD-10-PCS | Mod: S$GLB,,, | Performed by: PHYSICIAN ASSISTANT

## 2023-08-04 PROCEDURE — 1125F AMNT PAIN NOTED PAIN PRSNT: CPT | Mod: CPTII,S$GLB,, | Performed by: PHYSICIAN ASSISTANT

## 2023-08-04 PROCEDURE — 72100 X-RAY EXAM L-S SPINE 2/3 VWS: CPT | Mod: 26,,, | Performed by: RADIOLOGY

## 2023-08-04 PROCEDURE — 1160F PR REVIEW ALL MEDS BY PRESCRIBER/CLIN PHARMACIST DOCUMENTED: ICD-10-PCS | Mod: CPTII,S$GLB,, | Performed by: PHYSICIAN ASSISTANT

## 2023-08-04 PROCEDURE — 1100F PTFALLS ASSESS-DOCD GE2>/YR: CPT | Mod: CPTII,S$GLB,, | Performed by: PHYSICIAN ASSISTANT

## 2023-08-04 PROCEDURE — 3008F PR BODY MASS INDEX (BMI) DOCUMENTED: ICD-10-PCS | Mod: CPTII,S$GLB,, | Performed by: PHYSICIAN ASSISTANT

## 2023-08-04 PROCEDURE — 72100 X-RAY EXAM L-S SPINE 2/3 VWS: CPT | Mod: TC,FY,PO

## 2023-08-04 PROCEDURE — 99213 OFFICE O/P EST LOW 20 MIN: CPT | Mod: S$GLB,,, | Performed by: PHYSICIAN ASSISTANT

## 2023-08-04 PROCEDURE — 1159F PR MEDICATION LIST DOCUMENTED IN MEDICAL RECORD: ICD-10-PCS | Mod: CPTII,S$GLB,, | Performed by: PHYSICIAN ASSISTANT

## 2023-08-04 PROCEDURE — 1125F PR PAIN SEVERITY QUANTIFIED, PAIN PRESENT: ICD-10-PCS | Mod: CPTII,S$GLB,, | Performed by: PHYSICIAN ASSISTANT

## 2023-08-04 PROCEDURE — 1100F PR PT FALLS ASSESS DOC 2+ FALLS/FALL W/INJURY/YR: ICD-10-PCS | Mod: CPTII,S$GLB,, | Performed by: PHYSICIAN ASSISTANT

## 2023-08-04 NOTE — PROGRESS NOTES
[facility] Neurosurgery - [facility]  Clinic Progress Note       PCP: Marcelino Bullard MD    SUBJECTIVE:     Chief Complaint:   Chief Complaint   Patient presents with    Follow-up     Patient present to clinic today as compression fracture follow up with XR. Low back pain; bilateral feet numbness/tingling. Completed few PT visits        History of Present Illness:  Safia Ibarra is a 68 y.o. female who presents for follow up. Patient was just seen by Dr. Singletary 2 weeks ago. She reports continued back pain and paresthesias in her feet. She is attending PT, but has not discussed injections with pain management. She completed x-rays today.     Pertinent and recent history, provider evaluations, imaging and data reviewed in Epic    PREVIOUS HISTORY  Safia Ibarra is a 68 y.o. female  Very nice patient  With, back pain and radiculopathy, ambulates independently high frequency and severity of symptoms  Very complex multilevel severe degeneration facet arthropathy disc osteophyte complex degenerative disc disease throughout her upper thoracic and more severe throughout her lumbar spine from L1-S1; she has pain is neurologically intact.  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 Hannah benign over 5 yrs ago    EPIDURAL STEROID INJECTION INTO LUMBAR SPINE N/A 1/3/2022    Procedure: Injection-steroid-epidural-lumbar L5/S1;  Surgeon: Yobani Nieves MD;  Location: [facility] OR;  Service: Pain Management;  Laterality: N/A;    EPIDURAL STEROID INJECTION INTO LUMBAR SPINE N/A 9/21/2022    Procedure: Injection-steroid-epidural-lumbar L5/S1;  Surgeon: Yobani Nieves MD;  Location: [facility] OR;  Service: Pain Management;  Laterality: N/A;    EPIDURAL STEROID INJECTION INTO LUMBAR SPINE N/A 4/26/2023    Procedure: Injection-steroid-epidural-lumbar L5/S1;  Surgeon: Yobani Nieves MD;  Location: [facility] OR;  Service: Pain Management;  Laterality: N/A;    EPIDURAL STEROID INJECTION INTO LUMBAR SPINE N/A 6/5/2023    Procedure: Injection-steroid-epidural-lumbar L4/L5;  Surgeon: Yobani Nieves MD;  Location: [facility] OR;  Service: Pain Management;  Laterality: N/A;    HYSTERECTOMY       Family History   Problem Relation Age of Onset    Uterine cancer Mother     Breast cancer Paternal Aunt      Social History     Tobacco Use    Smoking status: Never    Smokeless tobacco: Never      Review of patient's allergies indicates:   Allergen Reactions    Bactrim [sulfamethoxazole-trimethoprim] Itching       Current Outpatient Medications:     ALPRAZolam (XANAX) 0.25 MG tablet, Take 0.25 mg by mouth daily as needed., Disp: , Rfl:     amLODIPine (NORVASC) 5 MG tablet, Take 5 mg by mouth once daily., Disp: , Rfl:     levothyroxine (SYNTHROID, LEVOTHROID) 175 MCG tablet, , Disp: , Rfl:     meloxicam (MOBIC) 15 MG tablet, Take 15 mg by mouth daily as needed., Disp: , Rfl:     nitroGLYCERIN (NITROSTAT) 0.4 MG SL tablet, For CHEST PAIN: Put 1 tablet under tongue every 5 minutes for 3 doses as needed. If no relief, GO TO ER., Disp: 25 tablet, Rfl: 0     nystatin (MYCOSTATIN) cream, 3 (three) times daily. Apply to affected area, Disp: , Rfl:     ondansetron (ZOFRAN-ODT) 4 MG TbDL, 4 mg every 6 (six) hours as needed., Disp: , Rfl:     rosuvastatin (CRESTOR) 10 MG tablet, Take 10 mg by mouth nightly., Disp: , Rfl:     traMADoL (ULTRAM) 50 mg tablet, Take 1 tablet (50 mg total) by mouth every 8 (eight) hours as needed for Pain., Disp: 21 tablet, Rfl: 0    diclofenac (VOLTAREN) 50 MG EC tablet, Take by mouth., Disp: , Rfl:     fesoterodine (TOVIAZ) 4 mg Tb24, Take 4 mg by mouth every evening., Disp: , Rfl:     pregabalin (LYRICA) 75 MG capsule, Take 1 capsule (75 mg total) by mouth 2 (two) times daily., Disp: 60 capsule, Rfl: 1    tiZANidine (ZANAFLEX) 4 MG tablet, Take 4 mg by mouth every 8 (eight) hours as needed., Disp: , Rfl:     Review of Systems:   Constitutional: no fever, chills or night sweats. No changes in weight   Eyes: no visual changes   ENT: no nasal congestion or sore throat   Respiratory: no cough or shortness of breath   Cardiovascular: no chest pain or palpitations   Gastrointestinal: no nausea or vomiting   Genitourinary: no hematuria or dysuria   Integument/Breast: no rash or pruritis   Hematologic/Lymphatic: no easy bruising or lymphadenopathy   Musculoskeletal: +back pain   Neurological: no seizures or tremors +paresthesias   Behavioral/Psych: no auditory or visual hallucinations   Endocrine: no heat or cold intolerance         OBJECTIVE:     Vital Signs (Most Recent):  Resp: 18 (08/04/23 1109)  Estimated body mass index is 59.57 kg/m² as calculated from the following:    Height as of this encounter: 5' (1.524 m).    Weight as of this encounter: 138.3 kg (305 lb).    Physical Exam:   General: well developed, well nourished, no distress   Neurologic: Alert and oriented. Thought content appropriate. GCS 15.   Head: normocephalic, atraumatic  Eyes: EOMI  Neck: trachea midline, no JVD   Cardiovascular: no LE edema  Pulmonary: normal respirations, no  signs of respiratory distress  Abdomen: non-distended  Skin: Skin is warm, dry and intact    Motor Strength: Moves all extremities spontaneously with good tone. No abnormal movements seen.       Iliopsoas Quadriceps Knee  Flexion Tibialis  anterior Gastro- cnemius EHL   Lower: R 5/5 5/5 5/5 5/5 5/5 5/5    L 5/5 5/5 5/5 5/5 5/5 5/5     DTR's: 1+ LE   Gait: deferred            Diagnostic Results:  I have independently reviewed the following imaging:  XR lumbar spine reveals stable L5 compression fracture     ASSESSMENT/PLAN:     Scoliosis due to degenerative disease of spine in adult patient  -     Ambulatory referral/consult to Neurosurgery; Future; Expected date: 08/11/2023    Compression fracture of L5 vertebra, initial encounter  -     Ambulatory referral/consult to Neurosurgery    Spondylolisthesis of lumbar region  -     Ambulatory referral/consult to Neurosurgery; Future; Expected date: 08/11/2023        Safia Ibarra is a 68 y.o. female with stable L5 compression fracture with ongoing back and foot pain. We discussed that Dr. Singletary had previously recommended referral to complex spine for surgical consideration. We will help facilitate this appointment. We will also help facilitate a follow up with pain management to discuss injections.     Patient verbalized understanding of plan. Encouraged to call with any questions or concerns.     This note was partially dictated using voice recognition software, so please excuse any errors that were not corrected.

## 2023-08-09 ENCOUNTER — OFFICE VISIT (OUTPATIENT)
Dept: PAIN MEDICINE | Facility: CLINIC | Age: 68
End: 2023-08-09
Payer: COMMERCIAL

## 2023-08-09 VITALS
HEIGHT: 60 IN | DIASTOLIC BLOOD PRESSURE: 70 MMHG | SYSTOLIC BLOOD PRESSURE: 143 MMHG | BODY MASS INDEX: 59.57 KG/M2 | HEART RATE: 89 BPM

## 2023-08-09 DIAGNOSIS — M80.08XD AGE-RELATED OSTEOPOROSIS WITH CURRENT PATHOLOGICAL FRACTURE OF VERTEBRA WITH ROUTINE HEALING, SUBSEQUENT ENCOUNTER: ICD-10-CM

## 2023-08-09 DIAGNOSIS — M48.062 SPINAL STENOSIS OF LUMBAR REGION WITH NEUROGENIC CLAUDICATION: ICD-10-CM

## 2023-08-09 DIAGNOSIS — M54.16 LUMBAR RADICULOPATHY: ICD-10-CM

## 2023-08-09 DIAGNOSIS — M54.9 DORSALGIA, UNSPECIFIED: Primary | ICD-10-CM

## 2023-08-09 PROCEDURE — 1160F RVW MEDS BY RX/DR IN RCRD: CPT | Mod: CPTII,S$GLB,, | Performed by: ANESTHESIOLOGY

## 2023-08-09 PROCEDURE — 99214 PR OFFICE/OUTPT VISIT, EST, LEVL IV, 30-39 MIN: ICD-10-PCS | Mod: S$GLB,,, | Performed by: ANESTHESIOLOGY

## 2023-08-09 PROCEDURE — 1160F PR REVIEW ALL MEDS BY PRESCRIBER/CLIN PHARMACIST DOCUMENTED: ICD-10-PCS | Mod: CPTII,S$GLB,, | Performed by: ANESTHESIOLOGY

## 2023-08-09 PROCEDURE — 99214 OFFICE O/P EST MOD 30 MIN: CPT | Mod: S$GLB,,, | Performed by: ANESTHESIOLOGY

## 2023-08-09 PROCEDURE — 3078F DIAST BP <80 MM HG: CPT | Mod: CPTII,S$GLB,, | Performed by: ANESTHESIOLOGY

## 2023-08-09 PROCEDURE — 3008F BODY MASS INDEX DOCD: CPT | Mod: CPTII,S$GLB,, | Performed by: ANESTHESIOLOGY

## 2023-08-09 PROCEDURE — 1125F AMNT PAIN NOTED PAIN PRSNT: CPT | Mod: CPTII,S$GLB,, | Performed by: ANESTHESIOLOGY

## 2023-08-09 PROCEDURE — 3078F PR MOST RECENT DIASTOLIC BLOOD PRESSURE < 80 MM HG: ICD-10-PCS | Mod: CPTII,S$GLB,, | Performed by: ANESTHESIOLOGY

## 2023-08-09 PROCEDURE — 3077F PR MOST RECENT SYSTOLIC BLOOD PRESSURE >= 140 MM HG: ICD-10-PCS | Mod: CPTII,S$GLB,, | Performed by: ANESTHESIOLOGY

## 2023-08-09 PROCEDURE — 99999 PR PBB SHADOW E&M-EST. PATIENT-LVL III: CPT | Mod: PBBFAC,,, | Performed by: ANESTHESIOLOGY

## 2023-08-09 PROCEDURE — 1159F MED LIST DOCD IN RCRD: CPT | Mod: CPTII,S$GLB,, | Performed by: ANESTHESIOLOGY

## 2023-08-09 PROCEDURE — 3288F PR FALLS RISK ASSESSMENT DOCUMENTED: ICD-10-PCS | Mod: CPTII,S$GLB,, | Performed by: ANESTHESIOLOGY

## 2023-08-09 PROCEDURE — 1125F PR PAIN SEVERITY QUANTIFIED, PAIN PRESENT: ICD-10-PCS | Mod: CPTII,S$GLB,, | Performed by: ANESTHESIOLOGY

## 2023-08-09 PROCEDURE — 1101F PR PT FALLS ASSESS DOC 0-1 FALLS W/OUT INJ PAST YR: ICD-10-PCS | Mod: CPTII,S$GLB,, | Performed by: ANESTHESIOLOGY

## 2023-08-09 PROCEDURE — 3008F PR BODY MASS INDEX (BMI) DOCUMENTED: ICD-10-PCS | Mod: CPTII,S$GLB,, | Performed by: ANESTHESIOLOGY

## 2023-08-09 PROCEDURE — 99999 PR PBB SHADOW E&M-EST. PATIENT-LVL III: ICD-10-PCS | Mod: PBBFAC,,, | Performed by: ANESTHESIOLOGY

## 2023-08-09 PROCEDURE — 3288F FALL RISK ASSESSMENT DOCD: CPT | Mod: CPTII,S$GLB,, | Performed by: ANESTHESIOLOGY

## 2023-08-09 PROCEDURE — 1159F PR MEDICATION LIST DOCUMENTED IN MEDICAL RECORD: ICD-10-PCS | Mod: CPTII,S$GLB,, | Performed by: ANESTHESIOLOGY

## 2023-08-09 PROCEDURE — 3077F SYST BP >= 140 MM HG: CPT | Mod: CPTII,S$GLB,, | Performed by: ANESTHESIOLOGY

## 2023-08-09 PROCEDURE — 1101F PT FALLS ASSESS-DOCD LE1/YR: CPT | Mod: CPTII,S$GLB,, | Performed by: ANESTHESIOLOGY

## 2023-08-09 RX ORDER — DIAZEPAM 5 MG/1
5 TABLET ORAL ONCE AS NEEDED
Qty: 1 TABLET | Refills: 0 | Status: SHIPPED | OUTPATIENT
Start: 2023-08-09 | End: 2023-08-30

## 2023-08-09 NOTE — PROGRESS NOTES
[facility] Follow Up Evaluation    Referred by: Dr. Singletary  Reason for referral: back pain    CC:   Chief Complaint   Patient presents with    Leg Pain    Foot Pain    Back Pain      Last 3 PDI Scores 9/9/2022   Pain Disability Index (PDI) 41       Interval HPI 8/9/2023: Safia Ibarra returns to the office for follow up.  Her son today.  She reports in addition to the fall that she had when we last saw her she is had 2 more falls.  She has stop taking Lyrica and a muscle relaxant and is only taking Ultram once a day on an as needed basis for severe pain.  Today she reports she still get some lower back pain however her primary issue appears to be numbness and pain that radiated into her bilateral legs.  She is not having any new numbness or weakness since we last saw her.    Pain intervention history:  - s/p L5-S1 NATASHA on 1/3/22 with 80% relief   - s/p L5-S1 NATASHA on 9/21/22 with 70% relief lasting for over 5 months  - s/p L5/S1 NATASHA on 4/26/23 without relief  - s/p L4/5 NATASHA on 06/05/2023 with initially 80% relief.    HPI:   Safia Ibarra is a 68 y.o. female who complains of back pain    Onset: 6-7 months  Inciting Event: none  Progression: since onset, pain is gradually worsening  Current Pain Score: 6/10  Typical Range: 2-10/10  Timing: constant  Quality: shooting  Radiation: yes, down the back both legs  Associated numbness or weakness: yes numbness   Exacerbated by: walking  Allievated by: laying down  Is Pain Level Acceptable?: No    Previous Therapies:  PT/OT: yes  HEP: yes  Interventions:   Surgery:  Medications:   - NSAIDS:   - MSK Relaxants:   - TCAs:   - SNRIs:   - Topicals:   - Anticonvulsants:  - Opioids:     History:    Current Outpatient Medications:     ALPRAZolam (XANAX) 0.25 MG tablet, Take 0.25 mg by mouth daily as needed., Disp: , Rfl:     amLODIPine (NORVASC) 5 MG tablet, Take 5 mg by mouth once daily., Disp: , Rfl:     levothyroxine (SYNTHROID, LEVOTHROID) 175 MCG tablet, ,  Disp: , Rfl:     meloxicam (MOBIC) 15 MG tablet, Take 15 mg by mouth daily as needed., Disp: , Rfl:     nitroGLYCERIN (NITROSTAT) 0.4 MG SL tablet, For CHEST PAIN: Put 1 tablet under tongue every 5 minutes for 3 doses as needed. If no relief, GO TO ER., Disp: 25 tablet, Rfl: 0    nystatin (MYCOSTATIN) cream, 3 (three) times daily. Apply to affected area, Disp: , Rfl:     ondansetron (ZOFRAN-ODT) 4 MG TbDL, 4 mg every 6 (six) hours as needed., Disp: , Rfl:     rosuvastatin (CRESTOR) 10 MG tablet, Take 10 mg by mouth nightly., Disp: , Rfl:     traMADoL (ULTRAM) 50 mg tablet, Take 1 tablet (50 mg total) by mouth every 8 (eight) hours as needed for Pain., Disp: 21 tablet, Rfl: 0    diazePAM (VALIUM) 5 MG tablet, Take 1 tablet (5 mg total) by mouth once as needed for Anxiety (20-30 minutes prior to MRI.  need  if taking this)., Disp: 1 tablet, Rfl: 0    diclofenac (VOLTAREN) 50 MG EC tablet, Take by mouth., Disp: , Rfl:     fesoterodine (TOVIAZ) 4 mg Tb24, Take 4 mg by mouth every evening., Disp: , Rfl:     pregabalin (LYRICA) 75 MG capsule, Take 1 capsule (75 mg total) by mouth 2 (two) times daily., Disp: 60 capsule, Rfl: 1    tiZANidine (ZANAFLEX) 4 MG tablet, Take 4 mg by mouth every 8 (eight) hours as needed., Disp: , Rfl:     Past Medical History:   Diagnosis Date    HTN (hypertension)     Hyperlipidemia     Hypothyroidism     Osteoporosis        Past Surgical History:   Procedure Laterality Date    BREAST BIOPSY Left     core Dr. Hannah benign over 5 yrs ago    EPIDURAL STEROID INJECTION INTO LUMBAR SPINE N/A 1/3/2022    Procedure: Injection-steroid-epidural-lumbar L5/S1;  Surgeon: Yobani Nieves MD;  Location: [facility] OR;  Service: Pain Management;  Laterality: N/A;    EPIDURAL STEROID INJECTION INTO LUMBAR SPINE N/A 9/21/2022    Procedure: Injection-steroid-epidural-lumbar L5/S1;  Surgeon: Yobani Nieves MD;  Location: [facility] OR;  Service: Pain Management;  Laterality: N/A;    EPIDURAL STEROID INJECTION INTO  LUMBAR SPINE N/A 4/26/2023    Procedure: Injection-steroid-epidural-lumbar L5/S1;  Surgeon: Yobani Nieves MD;  Location: [facility] OR;  Service: Pain Management;  Laterality: N/A;    EPIDURAL STEROID INJECTION INTO LUMBAR SPINE N/A 6/5/2023    Procedure: Injection-steroid-epidural-lumbar L4/L5;  Surgeon: Yobani Nieves MD;  Location: [facility] OR;  Service: Pain Management;  Laterality: N/A;    HYSTERECTOMY         Family History   Problem Relation Age of Onset    Uterine cancer Mother     Breast cancer Paternal Aunt        Social History     Socioeconomic History    Marital status:    Tobacco Use    Smoking status: Never    Smokeless tobacco: Never       Review of patient's allergies indicates:   Allergen Reactions    Bactrim [sulfamethoxazole-trimethoprim] Itching       Review of Systems:  General ROS: negative for - fever  Psychological ROS: negative for - hostility  Hematological and Lymphatic ROS: negative for - bleeding problems  Endocrine ROS: negative for - unexpected weight changes  Respiratory ROS: no cough, shortness of breath, or wheezing  Cardiovascular ROS: no chest pain or dyspnea on exertion  Gastrointestinal ROS: no abdominal pain, change in bowel habits, or black or bloody stools  Musculoskeletal ROS: negative for - muscular weakness  Neurological ROS: positive for - numbness/tingling  Dermatological ROS: negative for rash    Physical Exam:  Vitals:    08/09/23 0925   BP: (!) 143/70   Pulse: 89   Height: 5' (1.524 m)   PainSc: 10-Worst pain ever   PainLoc: Leg     Body mass index is 59.57 kg/m².     Gen: NAD  Gait:  Presents in wheelchair.  Can ambulate on own.  Psych:  Mood appropriate for given condition  HEENT: eyes anicteric   GI: Abd soft  CV: RRR  Lungs: breathing unlabored   ROM: limited AROM of the L spine in all planes, full ROM at ankles, knees and hips    Sensation: intact to light touch in all dermatomes tested from L2-S1 bilaterally  Reflexes:   Palpation: Diffusely tender over lumbar  paraspinals   Tone: normal in the b/l knees and hips   Skin: intact  Extremities: No edema in b/l ankles or hands  Provacative tests:  Minimal tenderness to palpation over the lower lumbar spine       Right Left   L2/3 Iliacus Hip flexion  5  5   L3/4 Qudratus Femoris Knee Extension  5  5   L4/5 Tib Anterior Ankle Dorsiflexion   5  5   L5/S1 Extensor Hallicus Longus Great toe extension  5  5                 S1/S2 Gastroc/Soleus Plantar Flexion  5  5       Imaging:  MRI lumbar spine 05/13/2023  FINDINGS:  Alignment: Some rightward convexity of the lumbar spine noted.  There is slight grade 1 anterolisthesis of L4 on L5.     Vertebrae: Multiple small Schmorl's nodes and mild multilevel degenerative endplate changes are present.  Vertebral body heights are otherwise within normal limits.  No evidence of fracture or marrow replacement process.     Discs: There is generalized disc desiccation throughout the lumbar spine with moderate disc height loss at L2-3 and L3-4.  Suspect mild disc height loss at L1-2.     Cord: Normal.  Conus terminates at L2.     Degenerative findings:     T12-L1: Minimal posterior disc bulge. No spinal canal or neuroforaminal stenosis.  L1-L2: Generalized disc bulge, slightly asymmetric to left.  Thickening of the ligamentum flavum.  Mild spinal canal stenosis with severe left and moderate right neuroforaminal narrowing.  L2-L3: Generalized disc bulge, asymmetric to the right.  Thickening of the ligamentum flavum.  Mild-to-moderate spinal canal stenosis with severe right and moderate left neuroforaminal narrowing.  L3-L4: Right paracentral disc extrusion superimposed upon more generalized disc bulge.  Mild bilateral facet arthropathy and ligamentum flavum thickening.  Moderate spinal canal stenosis with severe right and moderate left neuroforaminal narrowing.  L4-L5: Severe bilateral facet arthropathy.  Broad-based disc bulge/uncovering of the intervertebral disc and thickening of the ligamentum  "flavum.  Moderate spinal canal stenosis with moderate right and mild-to-moderate left neural foraminal narrowing.  L5-S1: Severe bilateral facet arthropathy.  Generalized disc bulge.  Mild crowding of the right lateral recess, otherwise no spinal canal stenosis.  Moderate left and mild right neuroforaminal narrowing.     Paraspinal muscles & soft tissues: Unremarkable.    Xray lumbar spine 6/20/23  FINDINGS:  Rotatory levoconvex curvature of the lumbar spine.  Osseous structures demonstrate diffuse demineralization.  Mild L5 superior endplate compression deformity with approximately 20% height loss, which appears new in comparison to the prior MRI on 05/13/2023.  Remainder of the vertebral bodies appear to maintain normal height.  Multilevel degenerative endplate change, marginal osteophyte formation and intervertebral disc space narrowing, most pronounced at L1-2, L2-3, and L3-4.  Lower lumbar facet arthropathy.    Xray lumbar spine 8/4/23  FINDINGS:  Stable mild superior endplate compression fracture deformity of L5 compared to 06/20/2023.  Diffuse bony demineralization without evidence of any new or progressive fractures.  Lower lumbar predominant facet arthrosis and trace anterolisthesis of L4 on L5 redemonstrated.  Degenerative changes most notable at the level of the visualized lower thoracic spine and disc height loss throughout the lumbar spine which is moderate at L3-L4 and L2-L3.  Broad levocurvature.    Labs:  BMP  No results found for: "NA", "K", "CL", "CO2", "BUN", "CREATININE", "CALCIUM", "ANIONGAP", "ESTGFRAFRICA", "EGFRNONAA"  No results found for: "ALT", "AST", "GGT", "ALKPHOS", "BILITOT"    Assessment:   Problem List Items Addressed This Visit    None  Visit Diagnoses       Dorsalgia, unspecified    -  Primary    Relevant Orders    MRI Lumbar Spine Without Contrast    Age-related osteoporosis with current pathological fracture of vertebra with routine healing, subsequent encounter        Lumbar " radiculopathy        Spinal stenosis of lumbar region with neurogenic claudication                    68 y.o. year old female presents to the office with back pain, constant, 6/10, radiating down the back of both legs.  + numbness, no weakness.    8/9/23 - Safia Ibarra returns to the office for follow up.  Her son today.  She reports in addition to the fall that she had when we last saw her she is had 2 more falls.  She has stop taking Lyrica and a muscle relaxant and is only taking Ultram once a day on an as needed basis for severe pain.  Today she reports she still get some lower back pain however her primary issue appears to be numbness and pain that radiated into her bilateral legs.  She is not having any new numbness or weakness since we last saw her.    - on exam she is in a wheelchair.  She reports that she is been using a walker for ambulation since her initial fall back in June.  Prior to that she was not needing assistance to ambulate.  She has full strength of her lower extremities and intact sensation to light touch bilateral L2-S1.  She has mild tenderness to palpation over the lower lumbar spine  - I independently reviewed her lumbar MRI with her again today and she has a right paracentral disc extrusion at L3-4 with at least moderate but I think more severe central canal narrowing.  She has moderate central canal narrowing at L4-5  - updated lumbar x-ray that she got earlier this week shows L5 compression fracture without any significant change from June  - at this time I discussed with the patient and her son that she may be having some pain related to her compression fracture however it appears clinically that her main pain complaints are related to her central canal narrowing as she reports pain and numbness into her legs as her primary pain limit her.  She feels like she can do activities around her house and that her back does not bother her that much it is mostly her legs  - I am going  to get an updated lumbar MRI today to better evaluate any interval changes to her neural anatomy to see how we can best help.  We are going to call her once imaging complete to discuss further treatment options      :  Reviewed

## 2023-08-09 NOTE — H&P (VIEW-ONLY)
[facility] Follow Up Evaluation    Referred by: Dr. Singletary  Reason for referral: back pain    CC:   Chief Complaint   Patient presents with    Leg Pain    Foot Pain    Back Pain      Last 3 PDI Scores 9/9/2022   Pain Disability Index (PDI) 41       Interval HPI 8/9/2023: Safia Ibarra returns to the office for follow up.  Her son today.  She reports in addition to the fall that she had when we last saw her she is had 2 more falls.  She has stop taking Lyrica and a muscle relaxant and is only taking Ultram once a day on an as needed basis for severe pain.  Today she reports she still get some lower back pain however her primary issue appears to be numbness and pain that radiated into her bilateral legs.  She is not having any new numbness or weakness since we last saw her.    Pain intervention history:  - s/p L5-S1 NATASHA on 1/3/22 with 80% relief   - s/p L5-S1 NATASHA on 9/21/22 with 70% relief lasting for over 5 months  - s/p L5/S1 NATASHA on 4/26/23 without relief  - s/p L4/5 NATASHA on 06/05/2023 with initially 80% relief.    HPI:   Safia Ibarra is a 68 y.o. female who complains of back pain    Onset: 6-7 months  Inciting Event: none  Progression: since onset, pain is gradually worsening  Current Pain Score: 6/10  Typical Range: 2-10/10  Timing: constant  Quality: shooting  Radiation: yes, down the back both legs  Associated numbness or weakness: yes numbness   Exacerbated by: walking  Allievated by: laying down  Is Pain Level Acceptable?: No    Previous Therapies:  PT/OT: yes  HEP: yes  Interventions:   Surgery:  Medications:   - NSAIDS:   - MSK Relaxants:   - TCAs:   - SNRIs:   - Topicals:   - Anticonvulsants:  - Opioids:     History:    Current Outpatient Medications:     ALPRAZolam (XANAX) 0.25 MG tablet, Take 0.25 mg by mouth daily as needed., Disp: , Rfl:     amLODIPine (NORVASC) 5 MG tablet, Take 5 mg by mouth once daily., Disp: , Rfl:     levothyroxine (SYNTHROID, LEVOTHROID) 175 MCG tablet, ,  Disp: , Rfl:     meloxicam (MOBIC) 15 MG tablet, Take 15 mg by mouth daily as needed., Disp: , Rfl:     nitroGLYCERIN (NITROSTAT) 0.4 MG SL tablet, For CHEST PAIN: Put 1 tablet under tongue every 5 minutes for 3 doses as needed. If no relief, GO TO ER., Disp: 25 tablet, Rfl: 0    nystatin (MYCOSTATIN) cream, 3 (three) times daily. Apply to affected area, Disp: , Rfl:     ondansetron (ZOFRAN-ODT) 4 MG TbDL, 4 mg every 6 (six) hours as needed., Disp: , Rfl:     rosuvastatin (CRESTOR) 10 MG tablet, Take 10 mg by mouth nightly., Disp: , Rfl:     traMADoL (ULTRAM) 50 mg tablet, Take 1 tablet (50 mg total) by mouth every 8 (eight) hours as needed for Pain., Disp: 21 tablet, Rfl: 0    diazePAM (VALIUM) 5 MG tablet, Take 1 tablet (5 mg total) by mouth once as needed for Anxiety (20-30 minutes prior to MRI.  need  if taking this)., Disp: 1 tablet, Rfl: 0    diclofenac (VOLTAREN) 50 MG EC tablet, Take by mouth., Disp: , Rfl:     fesoterodine (TOVIAZ) 4 mg Tb24, Take 4 mg by mouth every evening., Disp: , Rfl:     pregabalin (LYRICA) 75 MG capsule, Take 1 capsule (75 mg total) by mouth 2 (two) times daily., Disp: 60 capsule, Rfl: 1    tiZANidine (ZANAFLEX) 4 MG tablet, Take 4 mg by mouth every 8 (eight) hours as needed., Disp: , Rfl:     Past Medical History:   Diagnosis Date    HTN (hypertension)     Hyperlipidemia     Hypothyroidism     Osteoporosis        Past Surgical History:   Procedure Laterality Date    BREAST BIOPSY Left     core Dr. Hannah benign over 5 yrs ago    EPIDURAL STEROID INJECTION INTO LUMBAR SPINE N/A 1/3/2022    Procedure: Injection-steroid-epidural-lumbar L5/S1;  Surgeon: Yobani Nieves MD;  Location: [facility] OR;  Service: Pain Management;  Laterality: N/A;    EPIDURAL STEROID INJECTION INTO LUMBAR SPINE N/A 9/21/2022    Procedure: Injection-steroid-epidural-lumbar L5/S1;  Surgeon: Yobani Nieves MD;  Location: [facility] OR;  Service: Pain Management;  Laterality: N/A;    EPIDURAL STEROID INJECTION INTO  LUMBAR SPINE N/A 4/26/2023    Procedure: Injection-steroid-epidural-lumbar L5/S1;  Surgeon: Yobani Nieves MD;  Location: [facility] OR;  Service: Pain Management;  Laterality: N/A;    EPIDURAL STEROID INJECTION INTO LUMBAR SPINE N/A 6/5/2023    Procedure: Injection-steroid-epidural-lumbar L4/L5;  Surgeon: Yobani Nieves MD;  Location: [facility] OR;  Service: Pain Management;  Laterality: N/A;    HYSTERECTOMY         Family History   Problem Relation Age of Onset    Uterine cancer Mother     Breast cancer Paternal Aunt        Social History     Socioeconomic History    Marital status:    Tobacco Use    Smoking status: Never    Smokeless tobacco: Never       Review of patient's allergies indicates:   Allergen Reactions    Bactrim [sulfamethoxazole-trimethoprim] Itching       Review of Systems:  General ROS: negative for - fever  Psychological ROS: negative for - hostility  Hematological and Lymphatic ROS: negative for - bleeding problems  Endocrine ROS: negative for - unexpected weight changes  Respiratory ROS: no cough, shortness of breath, or wheezing  Cardiovascular ROS: no chest pain or dyspnea on exertion  Gastrointestinal ROS: no abdominal pain, change in bowel habits, or black or bloody stools  Musculoskeletal ROS: negative for - muscular weakness  Neurological ROS: positive for - numbness/tingling  Dermatological ROS: negative for rash    Physical Exam:  Vitals:    08/09/23 0925   BP: (!) 143/70   Pulse: 89   Height: 5' (1.524 m)   PainSc: 10-Worst pain ever   PainLoc: Leg     Body mass index is 59.57 kg/m².     Gen: NAD  Gait:  Presents in wheelchair.  Can ambulate on own.  Psych:  Mood appropriate for given condition  HEENT: eyes anicteric   GI: Abd soft  CV: RRR  Lungs: breathing unlabored   ROM: limited AROM of the L spine in all planes, full ROM at ankles, knees and hips    Sensation: intact to light touch in all dermatomes tested from L2-S1 bilaterally  Reflexes:   Palpation: Diffusely tender over lumbar  paraspinals   Tone: normal in the b/l knees and hips   Skin: intact  Extremities: No edema in b/l ankles or hands  Provacative tests:  Minimal tenderness to palpation over the lower lumbar spine       Right Left   L2/3 Iliacus Hip flexion  5  5   L3/4 Qudratus Femoris Knee Extension  5  5   L4/5 Tib Anterior Ankle Dorsiflexion   5  5   L5/S1 Extensor Hallicus Longus Great toe extension  5  5                 S1/S2 Gastroc/Soleus Plantar Flexion  5  5       Imaging:  MRI lumbar spine 05/13/2023  FINDINGS:  Alignment: Some rightward convexity of the lumbar spine noted.  There is slight grade 1 anterolisthesis of L4 on L5.     Vertebrae: Multiple small Schmorl's nodes and mild multilevel degenerative endplate changes are present.  Vertebral body heights are otherwise within normal limits.  No evidence of fracture or marrow replacement process.     Discs: There is generalized disc desiccation throughout the lumbar spine with moderate disc height loss at L2-3 and L3-4.  Suspect mild disc height loss at L1-2.     Cord: Normal.  Conus terminates at L2.     Degenerative findings:     T12-L1: Minimal posterior disc bulge. No spinal canal or neuroforaminal stenosis.  L1-L2: Generalized disc bulge, slightly asymmetric to left.  Thickening of the ligamentum flavum.  Mild spinal canal stenosis with severe left and moderate right neuroforaminal narrowing.  L2-L3: Generalized disc bulge, asymmetric to the right.  Thickening of the ligamentum flavum.  Mild-to-moderate spinal canal stenosis with severe right and moderate left neuroforaminal narrowing.  L3-L4: Right paracentral disc extrusion superimposed upon more generalized disc bulge.  Mild bilateral facet arthropathy and ligamentum flavum thickening.  Moderate spinal canal stenosis with severe right and moderate left neuroforaminal narrowing.  L4-L5: Severe bilateral facet arthropathy.  Broad-based disc bulge/uncovering of the intervertebral disc and thickening of the ligamentum  "flavum.  Moderate spinal canal stenosis with moderate right and mild-to-moderate left neural foraminal narrowing.  L5-S1: Severe bilateral facet arthropathy.  Generalized disc bulge.  Mild crowding of the right lateral recess, otherwise no spinal canal stenosis.  Moderate left and mild right neuroforaminal narrowing.     Paraspinal muscles & soft tissues: Unremarkable.    Xray lumbar spine 6/20/23  FINDINGS:  Rotatory levoconvex curvature of the lumbar spine.  Osseous structures demonstrate diffuse demineralization.  Mild L5 superior endplate compression deformity with approximately 20% height loss, which appears new in comparison to the prior MRI on 05/13/2023.  Remainder of the vertebral bodies appear to maintain normal height.  Multilevel degenerative endplate change, marginal osteophyte formation and intervertebral disc space narrowing, most pronounced at L1-2, L2-3, and L3-4.  Lower lumbar facet arthropathy.    Xray lumbar spine 8/4/23  FINDINGS:  Stable mild superior endplate compression fracture deformity of L5 compared to 06/20/2023.  Diffuse bony demineralization without evidence of any new or progressive fractures.  Lower lumbar predominant facet arthrosis and trace anterolisthesis of L4 on L5 redemonstrated.  Degenerative changes most notable at the level of the visualized lower thoracic spine and disc height loss throughout the lumbar spine which is moderate at L3-L4 and L2-L3.  Broad levocurvature.    Labs:  BMP  No results found for: "NA", "K", "CL", "CO2", "BUN", "CREATININE", "CALCIUM", "ANIONGAP", "ESTGFRAFRICA", "EGFRNONAA"  No results found for: "ALT", "AST", "GGT", "ALKPHOS", "BILITOT"    Assessment:   Problem List Items Addressed This Visit    None  Visit Diagnoses       Dorsalgia, unspecified    -  Primary    Relevant Orders    MRI Lumbar Spine Without Contrast    Age-related osteoporosis with current pathological fracture of vertebra with routine healing, subsequent encounter        Lumbar " radiculopathy        Spinal stenosis of lumbar region with neurogenic claudication                    68 y.o. year old female presents to the office with back pain, constant, 6/10, radiating down the back of both legs.  + numbness, no weakness.    8/9/23 - Safia Ibarra returns to the office for follow up.  Her son today.  She reports in addition to the fall that she had when we last saw her she is had 2 more falls.  She has stop taking Lyrica and a muscle relaxant and is only taking Ultram once a day on an as needed basis for severe pain.  Today she reports she still get some lower back pain however her primary issue appears to be numbness and pain that radiated into her bilateral legs.  She is not having any new numbness or weakness since we last saw her.    - on exam she is in a wheelchair.  She reports that she is been using a walker for ambulation since her initial fall back in June.  Prior to that she was not needing assistance to ambulate.  She has full strength of her lower extremities and intact sensation to light touch bilateral L2-S1.  She has mild tenderness to palpation over the lower lumbar spine  - I independently reviewed her lumbar MRI with her again today and she has a right paracentral disc extrusion at L3-4 with at least moderate but I think more severe central canal narrowing.  She has moderate central canal narrowing at L4-5  - updated lumbar x-ray that she got earlier this week shows L5 compression fracture without any significant change from June  - at this time I discussed with the patient and her son that she may be having some pain related to her compression fracture however it appears clinically that her main pain complaints are related to her central canal narrowing as she reports pain and numbness into her legs as her primary pain limit her.  She feels like she can do activities around her house and that her back does not bother her that much it is mostly her legs  - I am going  to get an updated lumbar MRI today to better evaluate any interval changes to her neural anatomy to see how we can best help.  We are going to call her once imaging complete to discuss further treatment options      :  Reviewed

## 2023-08-10 ENCOUNTER — PATIENT MESSAGE (OUTPATIENT)
Dept: NEUROSURGERY | Facility: CLINIC | Age: 68
End: 2023-08-10
Payer: MEDICARE

## 2023-08-21 ENCOUNTER — TELEPHONE (OUTPATIENT)
Dept: PAIN MEDICINE | Facility: CLINIC | Age: 68
End: 2023-08-21
Payer: MEDICARE

## 2023-08-21 ENCOUNTER — HOSPITAL ENCOUNTER (OUTPATIENT)
Dept: RADIOLOGY | Facility: HOSPITAL | Age: 68
Discharge: HOME | End: 2023-08-21
Attending: ANESTHESIOLOGY
Payer: COMMERCIAL

## 2023-08-21 DIAGNOSIS — M54.9 DORSALGIA, UNSPECIFIED: ICD-10-CM

## 2023-08-21 PROCEDURE — 72148 MRI LUMBAR SPINE W/O DYE: CPT | Mod: TC,PO

## 2023-08-21 PROCEDURE — 72148 MRI LUMBAR SPINE W/O DYE: CPT | Mod: 26,,, | Performed by: RADIOLOGY

## 2023-08-21 PROCEDURE — 72148 MRI LUMBAR SPINE WITHOUT CONTRAST: ICD-10-PCS | Mod: 26,,, | Performed by: RADIOLOGY

## 2023-08-21 NOTE — TELEPHONE ENCOUNTER
Please let the patient know I have reviewed her updated MRI.      Her compression fracture and L5 appears stable compared to the CT scan that was taken in June 25th.  At this time I recommend we try an epidural injection for the pain in her back that is radiating down her leg    Physician - Dr Nieves    Type of Procedure/Injection - Lumbar Epidural  L4/5           Laterality - NA      Type of Sedation - Local      Need to hold medication - Yes      NSAIDs for 2 days      Clearance needed - No      Follow up - 2 week

## 2023-08-22 ENCOUNTER — PATIENT MESSAGE (OUTPATIENT)
Dept: PAIN MEDICINE | Facility: CLINIC | Age: 68
End: 2023-08-22
Payer: MEDICARE

## 2023-08-24 DIAGNOSIS — M54.16 LUMBAR RADICULOPATHY: Primary | ICD-10-CM

## 2023-08-24 RX ORDER — ALPRAZOLAM 0.5 MG/1
1 TABLET, ORALLY DISINTEGRATING ORAL ONCE AS NEEDED
Status: CANCELLED | OUTPATIENT
Start: 2023-08-24 | End: 2035-01-20

## 2023-08-29 ENCOUNTER — PATIENT MESSAGE (OUTPATIENT)
Dept: SURGERY | Facility: HOSPITAL | Age: 68
End: 2023-08-29
Payer: MEDICARE

## 2023-08-29 DIAGNOSIS — M54.16 LUMBAR RADICULOPATHY: ICD-10-CM

## 2023-08-30 RX ORDER — TRAMADOL HYDROCHLORIDE 50 MG/1
50 TABLET ORAL EVERY 8 HOURS PRN
Qty: 21 TABLET | Refills: 0 | Status: SHIPPED | OUTPATIENT
Start: 2023-08-30 | End: 2024-01-11 | Stop reason: SDUPTHER

## 2023-09-05 ENCOUNTER — HOSPITAL ENCOUNTER (OUTPATIENT)
Dept: RADIOLOGY | Facility: HOSPITAL | Age: 68
Discharge: HOME | End: 2023-09-05
Attending: ANESTHESIOLOGY | Admitting: ANESTHESIOLOGY
Payer: MEDICARE

## 2023-09-05 ENCOUNTER — HOSPITAL ENCOUNTER (OUTPATIENT)
Facility: HOSPITAL | Age: 68
Discharge: HOME | End: 2023-09-05
Attending: ANESTHESIOLOGY | Admitting: ANESTHESIOLOGY
Payer: COMMERCIAL

## 2023-09-05 DIAGNOSIS — M54.16 LUMBAR RADICULOPATHY: Primary | ICD-10-CM

## 2023-09-05 DIAGNOSIS — M54.50 LOWER BACK PAIN: ICD-10-CM

## 2023-09-05 PROCEDURE — 62323 NJX INTERLAMINAR LMBR/SAC: CPT | Mod: ,,, | Performed by: ANESTHESIOLOGY

## 2023-09-05 PROCEDURE — 25500020 PHARM REV CODE 255: Mod: PO | Performed by: ANESTHESIOLOGY

## 2023-09-05 PROCEDURE — 62323 PR INJ LUMBAR/SACRAL, W/IMAGING GUIDANCE: ICD-10-PCS | Mod: ,,, | Performed by: ANESTHESIOLOGY

## 2023-09-05 PROCEDURE — 63600175 PHARM REV CODE 636 W HCPCS: Mod: PO | Performed by: ANESTHESIOLOGY

## 2023-09-05 PROCEDURE — 25000003 PHARM REV CODE 250: Mod: PO | Performed by: ANESTHESIOLOGY

## 2023-09-05 PROCEDURE — 76000 FLUOROSCOPY <1 HR PHYS/QHP: CPT | Mod: TC,PO

## 2023-09-05 PROCEDURE — 62323 NJX INTERLAMINAR LMBR/SAC: CPT | Mod: PO | Performed by: ANESTHESIOLOGY

## 2023-09-05 RX ORDER — ALPRAZOLAM 0.5 MG/1
1 TABLET, ORALLY DISINTEGRATING ORAL ONCE AS NEEDED
Status: DISCONTINUED | OUTPATIENT
Start: 2023-09-05 | End: 2023-09-05 | Stop reason: HOSPADM

## 2023-09-05 RX ORDER — LIDOCAINE HYDROCHLORIDE 10 MG/ML
INJECTION, SOLUTION EPIDURAL; INFILTRATION; INTRACAUDAL; PERINEURAL
Status: DISCONTINUED | OUTPATIENT
Start: 2023-09-05 | End: 2023-09-05 | Stop reason: HOSPADM

## 2023-09-05 RX ORDER — METHYLPREDNISOLONE ACETATE 80 MG/ML
INJECTION, SUSPENSION INTRA-ARTICULAR; INTRALESIONAL; INTRAMUSCULAR; SOFT TISSUE
Status: DISCONTINUED | OUTPATIENT
Start: 2023-09-05 | End: 2023-09-05 | Stop reason: HOSPADM

## 2023-09-05 RX ORDER — SODIUM CHLORIDE 0.9 G/100ML
IRRIGANT IRRIGATION
Status: DISCONTINUED | OUTPATIENT
Start: 2023-09-05 | End: 2023-09-05 | Stop reason: HOSPADM

## 2023-09-05 NOTE — DISCHARGE SUMMARY
[facility]  Discharge Note  Short Stay    Admit Date: 9/5/2023    Discharge Date: 9/5/2023    Attending Physician: Yobani Nieves     Discharge Provider: Yobani Nieves    Diagnoses:  There are no hospital problems to display for this patient.      Discharged Condition: Good    Final Diagnoses: Lumbar radiculopathy [M54.16]    Disposition: Home or Self Care    Hospital Course: No complications, uneventful    Outcome of Hospitalization, Treatment, Procedure, or Surgery:  Patient was admitted for outpatient interventional pain management procedure. The patient tolerated the procedure well with no complications.    Follow up/Patient Instructions:  Follow up as scheduled in Pain Management office in 2-3 weeks.  Patient has received instructions and follow up date and time.    Medications:  Continue previous medications    Discharge Procedure Orders   Notify your health care provider if you experience any of the following:  temperature >100.4     Notify your health care provider if you experience any of the following:  persistent nausea and vomiting or diarrhea     Notify your health care provider if you experience any of the following:  severe uncontrolled pain     Notify your health care provider if you experience any of the following:  redness, tenderness, or signs of infection (pain, swelling, redness, odor or green/yellow discharge around incision site)     Notify your health care provider if you experience any of the following:  difficulty breathing or increased cough     Notify your health care provider if you experience any of the following:  severe persistent headache     Notify your health care provider if you experience any of the following:  worsening rash     Notify your health care provider if you experience any of the following:  persistent dizziness, light-headedness, or visual disturbances     Notify your health care provider if you experience any of the following:  increased confusion or  weakness     Activity as tolerated

## 2023-09-05 NOTE — INTERVAL H&P NOTE
The patient has been examined and the H&P has been reviewed:    I concur with the findings and no changes have occurred since H&P was written. No changes other than I reviewed her updated MRI.  She continues to report her primary pain is pain radiating down her legs to her feet.  Her mri shows compression deformity in the L5 vertebral body with approximately 25-50% vertebral body height loss. She also has central canal stenosis at the L3-L4 level where a large protrusion is present with slight progression suspected since the prior MRI and central canal narrowing also is favored to be progressed at the L4-L5 level since the prior MRI.    We will proceed with L5/S1 NATASHA. The risks and benefits of this intervention, and alternative therapies were discussed with the patient.  The discussion of risks included infection, bleeding, need for additional procedures or surgery, nerve damage.  Questions regarding the procedure, risks, expected outcome, and possible side effects were solicited and answered to the patient's satisfaction.  Safia Keen wishes to proceed with the injection or procedure.  Written consent was obtained.        There are no hospital problems to display for this patient.

## 2023-09-05 NOTE — OP NOTE
"Procedure Note    Procedure Date: 9/5/2023    Procedure Performed:  L5/S1 lumbar interlaminar epidural steroid injection under fluoroscopy.    Indications:  Safia Ibarra presents with lumbar radiculitis/radiculopathy secondary to disc herniation, osteophyte/osteophyte complexes, and/or severe degenerative disc disease producing foraminal or central spinal stenosis.  The pain has been present for at least 4 weeks and the patient has failed to respond to noninvasive conservative care.  Pain rated by NRS at baseline prior to intervention is 6/10.  Their radiculitis/radiculopathy and/or neurogenic claudication is severe enough to greatly impact their quality of life or function.     Pre-op diagnosis: Lumbar Radiculopathy    Post-op diagnosis: same    Physician: Yobani Nieves MD    IV anxiolysis medications: versed 2mg    Medications injected: depomedrol 80mg, 1% Lidocaine 1ml, 2 mL sterile, preservative-free normal saline.    Local anesthetic used: 1% Lidocaine, 1 ml    Estimated Blood Loss: none    Complications:  none    Technique:  The patient was interviewed in the holding area and Risks/Benefits were discussed, including, but not limited to, the possibility of new or different pain, bleeding or infection.   All questions were answered.  The patient understood and accepted risks.  Consent was verfied.  A time-out was taken to identify patient and procedure prior to starting the procedure. The patient was placed in the prone position on the fluoroscopy table. The area of the lumbar spine was prepped with Chloraprep x2 and draped in a sterile manner. The L5/S1 interspace was identified and marked under AP fluoroscopy. The skin and subcutaneous tissues overlying the targeted interspace were anesthetized with 3-5 mL of 1% lidocaine using a 25G, 1.5" needle.  A 20G, 3.5" Tuohy epidural needle was directed toward the interspace under fluoroscopic guidance until the ligamentum flavum was engaged. From this point, a " loss of resistance technique with a glass syringe and saline was used to identify entrance of the needle into the epidural space. Once loss of resistance was observed 1 mL of contrast solution was injected. An appropriate epidurogram was noted.  A 4 mL mixture consisting of saline, 1 mL 1% Lidocaine and 80 mg of depomedrol was injected slowly and without resistance.  The needle was flushed with normal saline and removed. The contrast was seen to be displaced after injection. Patient was awake/responsive during all injections.  The patient tolerated the procedure well and was transferred to the [facility] in stable condition.  The patient was monitored after the procedure and was given post-procedure and discharge instructions to follow at home. The patient was discharged in a stable condition.

## 2023-09-06 VITALS
TEMPERATURE: 98 F | BODY MASS INDEX: 57.52 KG/M2 | SYSTOLIC BLOOD PRESSURE: 144 MMHG | DIASTOLIC BLOOD PRESSURE: 93 MMHG | OXYGEN SATURATION: 97 % | HEIGHT: 60 IN | WEIGHT: 293 LBS | RESPIRATION RATE: 20 BRPM | HEART RATE: 127 BPM

## 2023-09-08 ENCOUNTER — PATIENT MESSAGE (OUTPATIENT)
Dept: PAIN MEDICINE | Facility: CLINIC | Age: 68
End: 2023-09-08
Payer: MEDICARE

## 2023-09-09 NOTE — TELEPHONE ENCOUNTER
Patient requesting refill for mobic. Patient request that it be sent to [facility] pharmacy.

## 2023-09-12 ENCOUNTER — OFFICE VISIT (OUTPATIENT)
Dept: PAIN MEDICINE | Facility: CLINIC | Age: 68
End: 2023-09-12
Payer: COMMERCIAL

## 2023-09-12 VITALS
BODY MASS INDEX: 59.57 KG/M2 | DIASTOLIC BLOOD PRESSURE: 79 MMHG | HEIGHT: 60 IN | SYSTOLIC BLOOD PRESSURE: 163 MMHG | HEART RATE: 70 BPM

## 2023-09-12 DIAGNOSIS — M54.9 DORSALGIA, UNSPECIFIED: ICD-10-CM

## 2023-09-12 DIAGNOSIS — M48.062 SPINAL STENOSIS OF LUMBAR REGION WITH NEUROGENIC CLAUDICATION: ICD-10-CM

## 2023-09-12 DIAGNOSIS — M54.16 LUMBAR RADICULOPATHY: Primary | ICD-10-CM

## 2023-09-12 DIAGNOSIS — M80.08XD AGE-RELATED OSTEOPOROSIS WITH CURRENT PATHOLOGICAL FRACTURE OF VERTEBRA WITH ROUTINE HEALING, SUBSEQUENT ENCOUNTER: ICD-10-CM

## 2023-09-12 PROCEDURE — 1100F PTFALLS ASSESS-DOCD GE2>/YR: CPT | Mod: CPTII,S$GLB,,

## 2023-09-12 PROCEDURE — 99214 OFFICE O/P EST MOD 30 MIN: CPT | Mod: S$GLB,,,

## 2023-09-12 PROCEDURE — 3288F FALL RISK ASSESSMENT DOCD: CPT | Mod: CPTII,S$GLB,,

## 2023-09-12 PROCEDURE — 99999 PR PBB SHADOW E&M-EST. PATIENT-LVL III: CPT | Mod: PBBFAC,,,

## 2023-09-12 PROCEDURE — 1159F PR MEDICATION LIST DOCUMENTED IN MEDICAL RECORD: ICD-10-PCS | Mod: CPTII,S$GLB,,

## 2023-09-12 PROCEDURE — 1125F PR PAIN SEVERITY QUANTIFIED, PAIN PRESENT: ICD-10-PCS | Mod: CPTII,S$GLB,,

## 2023-09-12 PROCEDURE — 1125F AMNT PAIN NOTED PAIN PRSNT: CPT | Mod: CPTII,S$GLB,,

## 2023-09-12 PROCEDURE — 99214 PR OFFICE/OUTPT VISIT, EST, LEVL IV, 30-39 MIN: ICD-10-PCS | Mod: S$GLB,,,

## 2023-09-12 PROCEDURE — 3008F BODY MASS INDEX DOCD: CPT | Mod: CPTII,S$GLB,,

## 2023-09-12 PROCEDURE — 3077F SYST BP >= 140 MM HG: CPT | Mod: CPTII,S$GLB,,

## 2023-09-12 PROCEDURE — 99999 PR PBB SHADOW E&M-EST. PATIENT-LVL III: ICD-10-PCS | Mod: PBBFAC,,,

## 2023-09-12 PROCEDURE — 1159F MED LIST DOCD IN RCRD: CPT | Mod: CPTII,S$GLB,,

## 2023-09-12 PROCEDURE — 3078F PR MOST RECENT DIASTOLIC BLOOD PRESSURE < 80 MM HG: ICD-10-PCS | Mod: CPTII,S$GLB,,

## 2023-09-12 PROCEDURE — 3288F PR FALLS RISK ASSESSMENT DOCUMENTED: ICD-10-PCS | Mod: CPTII,S$GLB,,

## 2023-09-12 PROCEDURE — 3077F PR MOST RECENT SYSTOLIC BLOOD PRESSURE >= 140 MM HG: ICD-10-PCS | Mod: CPTII,S$GLB,,

## 2023-09-12 PROCEDURE — 1100F PR PT FALLS ASSESS DOC 2+ FALLS/FALL W/INJURY/YR: ICD-10-PCS | Mod: CPTII,S$GLB,,

## 2023-09-12 PROCEDURE — 3008F PR BODY MASS INDEX (BMI) DOCUMENTED: ICD-10-PCS | Mod: CPTII,S$GLB,,

## 2023-09-12 PROCEDURE — 3078F DIAST BP <80 MM HG: CPT | Mod: CPTII,S$GLB,,

## 2023-09-12 RX ORDER — MELOXICAM 15 MG/1
15 TABLET ORAL DAILY PRN
Qty: 60 TABLET | Refills: 1 | Status: SHIPPED | OUTPATIENT
Start: 2023-09-12 | End: 2023-10-12

## 2023-09-13 ENCOUNTER — PATIENT MESSAGE (OUTPATIENT)
Dept: PAIN MEDICINE | Facility: CLINIC | Age: 68
End: 2023-09-13
Payer: MEDICARE

## 2023-09-26 ENCOUNTER — TELEPHONE (OUTPATIENT)
Dept: PAIN MEDICINE | Facility: CLINIC | Age: 68
End: 2023-09-26
Payer: MEDICARE

## 2023-09-26 NOTE — TELEPHONE ENCOUNTER
----- Message from Philly Silva Patient Care Assistant sent at 9/19/2023 12:23 PM CDT -----  Contact: Susy Petersen  Type: Needs Medical Advice    Who Called: Susy Petersen  Best Call Back Number: [phone]  Inquiry/Question: Delmy is calling to get out of work dates and return to work dates for the pt. Please advise. Thank you~

## 2023-09-28 ENCOUNTER — TELEPHONE (OUTPATIENT)
Dept: PAIN MEDICINE | Facility: CLINIC | Age: 68
End: 2023-09-28
Payer: MEDICARE

## 2023-09-28 NOTE — TELEPHONE ENCOUNTER
Returned call pt stated that she was calling to see if the office received fax dates from [facility] for MRI, pt also stated that she needs someone from the to contact her insurance [employer] regarding her disability [phone]

## 2023-09-28 NOTE — TELEPHONE ENCOUNTER
----- Message from Geraldine Keys sent at 9/27/2023  9:38 AM CDT -----  Contact: pt  Type: Needs Medical Advice  Who Called:  pt  Best Call Back Number: [phone]    Additional Information: Pt is calling the office returning call back.Please call back and advise.

## 2023-10-03 ENCOUNTER — TELEPHONE (OUTPATIENT)
Dept: NEUROSURGERY | Facility: CLINIC | Age: 68
End: 2023-10-03
Payer: MEDICARE

## 2023-10-03 NOTE — TELEPHONE ENCOUNTER
----- Message from Hien Cortes sent at 9/25/2023  1:17 PM CDT -----  Regarding: pt advice  Contact: [phone]  Pt had a miss call in regards to rescheduling due to provider being out, wade reynoso pt.

## 2023-10-10 ENCOUNTER — OFFICE VISIT (OUTPATIENT)
Dept: NEUROSURGERY | Facility: CLINIC | Age: 68
End: 2023-10-10
Payer: COMMERCIAL

## 2023-10-10 DIAGNOSIS — M41.50 SCOLIOSIS DUE TO DEGENERATIVE DISEASE OF SPINE IN ADULT PATIENT: Primary | ICD-10-CM

## 2023-10-10 PROCEDURE — 1160F PR REVIEW ALL MEDS BY PRESCRIBER/CLIN PHARMACIST DOCUMENTED: ICD-10-PCS | Mod: CPTII,95,, | Performed by: NURSE PRACTITIONER

## 2023-10-10 PROCEDURE — 1160F RVW MEDS BY RX/DR IN RCRD: CPT | Mod: CPTII,95,, | Performed by: NURSE PRACTITIONER

## 2023-10-10 PROCEDURE — 1159F MED LIST DOCD IN RCRD: CPT | Mod: CPTII,95,, | Performed by: NURSE PRACTITIONER

## 2023-10-10 PROCEDURE — 99211 PR OFFICE/OUTPT VISIT, EST, LEVL I: ICD-10-PCS | Mod: 95,,, | Performed by: NURSE PRACTITIONER

## 2023-10-10 PROCEDURE — 1159F PR MEDICATION LIST DOCUMENTED IN MEDICAL RECORD: ICD-10-PCS | Mod: CPTII,95,, | Performed by: NURSE PRACTITIONER

## 2023-10-10 PROCEDURE — 99211 OFF/OP EST MAY X REQ PHY/QHP: CPT | Mod: 95,,, | Performed by: NURSE PRACTITIONER

## 2023-10-10 NOTE — PROGRESS NOTES
Spoke to the patient, but she is uninterested in surgery at this time as she is obtaining relief with injections. We will cancel her appointment for today. Discussed reaching out to us when she would like to proceed with surgical options. She was agreeable.

## 2023-10-19 ENCOUNTER — TELEPHONE (OUTPATIENT)
Dept: PAIN MEDICINE | Facility: CLINIC | Age: 68
End: 2023-10-19

## 2023-10-19 NOTE — TELEPHONE ENCOUNTER
Spoke with patient, states she faxed over paperwork for short term disability last month and it was never filled out and received by [employer]. Patient also states her MRI and injection that she had done were denied and she needs us to submit something to her insurance. I explained to the patient that I do not have the paperwork at this time and she will need to have it refaxed so we can try to help with the situation. Patient extremely frustrated but states she will try to get the paperwork faxed to us again next week.

## 2023-10-19 NOTE — TELEPHONE ENCOUNTER
----- Message from Corazon Chin sent at 10/13/2023 12:43 PM CDT -----  Contact: patient  Type:  Needs Medical Advice    Who Called: Patient     Would the patient rather a call back or a response via [facility]? Call     Best Call Back Number: [phone] (home)      Additional Information: Patient would like to speak with the nurse in regards to needing help with insurance. She needs paperwork filled out.     Please call to advise

## 2023-10-20 ENCOUNTER — RESEARCH ENCOUNTER (OUTPATIENT)
Dept: RESEARCH | Facility: HOSPITAL | Age: 68
End: 2023-10-20
Payer: MEDICARE

## 2023-10-20 NOTE — PROGRESS NOTES
Study Title: Transcending COVID-19 barriers to pain care in [place]: Pragmatic comparative effectiveness trial of evidence-based, on-demand, digital behavioral treatments for chronic pain.  Sponsor:  [facility]   Study: [facility] Digital Pain Treatment Study - Transcending COVID-19 barriers to pain care in [place]: Pragmatic comparative effectiveness trial of evidence-based, on-demand, digital behavioral treatments for chronic pain.   IRB/Protocol #: 2021.177 - Phase 2?  Study#([facility]):  [id number]  Principle Investigator - Jimmy Lee   Sub-Investigator - Darius Flores   Sponsor:  [facility] Screening Interest in Study Call    Attempt #: 1      1. Contact Made: []Yes [x]No     1B. If no, date of final contact attempt: 10/20/23  1C. If no, reason(s) contact not made:  []Wrong number [x]No response []Other       Patient did not answer the attempted phone call. A message regarding the study was left.    If patient expressed interest, patient's information will be forwarded to Zoey Yi, Jerome Carlton, or Renea Bran.

## 2023-11-17 ENCOUNTER — OFFICE VISIT (OUTPATIENT)
Dept: PAIN MEDICINE | Facility: CLINIC | Age: 68
End: 2023-11-17
Payer: COMMERCIAL

## 2023-11-17 VITALS
HEART RATE: 83 BPM | WEIGHT: 293 LBS | DIASTOLIC BLOOD PRESSURE: 74 MMHG | SYSTOLIC BLOOD PRESSURE: 156 MMHG | BODY MASS INDEX: 57.52 KG/M2 | HEIGHT: 60 IN

## 2023-11-17 DIAGNOSIS — M47.816 LUMBAR SPONDYLOSIS: ICD-10-CM

## 2023-11-17 DIAGNOSIS — M48.062 SPINAL STENOSIS OF LUMBAR REGION WITH NEUROGENIC CLAUDICATION: Primary | ICD-10-CM

## 2023-11-17 DIAGNOSIS — M51.36 DDD (DEGENERATIVE DISC DISEASE), LUMBAR: ICD-10-CM

## 2023-11-17 DIAGNOSIS — M54.9 DORSALGIA, UNSPECIFIED: ICD-10-CM

## 2023-11-17 DIAGNOSIS — M54.16 LUMBAR RADICULOPATHY: ICD-10-CM

## 2023-11-17 PROCEDURE — 3077F PR MOST RECENT SYSTOLIC BLOOD PRESSURE >= 140 MM HG: ICD-10-PCS | Mod: CPTII,S$GLB,,

## 2023-11-17 PROCEDURE — 99999 PR PBB SHADOW E&M-EST. PATIENT-LVL II: CPT | Mod: PBBFAC,,,

## 2023-11-17 PROCEDURE — 3288F FALL RISK ASSESSMENT DOCD: CPT | Mod: CPTII,S$GLB,,

## 2023-11-17 PROCEDURE — 3288F PR FALLS RISK ASSESSMENT DOCUMENTED: ICD-10-PCS | Mod: CPTII,S$GLB,,

## 2023-11-17 PROCEDURE — 99214 OFFICE O/P EST MOD 30 MIN: CPT | Mod: S$GLB,,,

## 2023-11-17 PROCEDURE — 3078F PR MOST RECENT DIASTOLIC BLOOD PRESSURE < 80 MM HG: ICD-10-PCS | Mod: CPTII,S$GLB,,

## 2023-11-17 PROCEDURE — 1101F PR PT FALLS ASSESS DOC 0-1 FALLS W/OUT INJ PAST YR: ICD-10-PCS | Mod: CPTII,S$GLB,,

## 2023-11-17 PROCEDURE — 3008F BODY MASS INDEX DOCD: CPT | Mod: CPTII,S$GLB,,

## 2023-11-17 PROCEDURE — 3077F SYST BP >= 140 MM HG: CPT | Mod: CPTII,S$GLB,,

## 2023-11-17 PROCEDURE — 1101F PT FALLS ASSESS-DOCD LE1/YR: CPT | Mod: CPTII,S$GLB,,

## 2023-11-17 PROCEDURE — 3078F DIAST BP <80 MM HG: CPT | Mod: CPTII,S$GLB,,

## 2023-11-17 PROCEDURE — 99999 PR PBB SHADOW E&M-EST. PATIENT-LVL II: ICD-10-PCS | Mod: PBBFAC,,,

## 2023-11-17 PROCEDURE — 99214 PR OFFICE/OUTPT VISIT, EST, LEVL IV, 30-39 MIN: ICD-10-PCS | Mod: S$GLB,,,

## 2023-11-17 PROCEDURE — 3008F PR BODY MASS INDEX (BMI) DOCUMENTED: ICD-10-PCS | Mod: CPTII,S$GLB,,

## 2023-11-17 NOTE — PROGRESS NOTES
[facility] Follow Up Evaluation    Referred by: Dr. Singletary  Reason for referral: back pain    CC:   Chief Complaint   Patient presents with    Leg Pain          11/17/2023     9:59 AM 9/12/2023     9:45 AM 9/9/2022     7:56 AM   Last 3 PDI Scores   Pain Disability Index (PDI) 21 33 41     Interval HPI 11/17/2023: Safia Ibarra returns to the office for follow up.  Today she is reporting return of her lower back pain, 5/10, across her lower back, with radiation down right leg.  Pain is worsened with physical activities such as standing and walking, cooking, cleaning, somewhat improved with rest.  She denies any numbness, liliana weakness or any new changes to her bowel or bladder function.  She reports previous epidural lasted roughly 1 month and then she fell and her pain returned.      Pain intervention history:  - s/p L5-S1 NATASHA on 1/3/22 with 80% relief   - s/p L5-S1 NATASHA on 9/21/22 with 70% relief lasting for over 5 months  - s/p L5/S1 NATASHA on 4/26/23 without relief  - s/p L4/5 NATASHA on 06/05/2023 with initially 80% relief.  - s/p L4/5 NATASHA on 09/05/2023 with greater than 50% relief.    HPI:   Safia Ibarra is a 68 y.o. female who complains of back pain    Onset: 6-7 months  Inciting Event: none  Progression: since onset, pain is gradually worsening  Current Pain Score: 6/10  Typical Range: 2-10/10  Timing: constant  Quality: shooting  Radiation: yes, down the back both legs  Associated numbness or weakness: yes numbness   Exacerbated by: walking  Allievated by: laying down  Is Pain Level Acceptable?: No    Previous Therapies:  PT/OT: yes  HEP: yes  Interventions:   Surgery:  Medications:   - NSAIDS:   - MSK Relaxants:   - TCAs:   - SNRIs:   - Topicals:   - Anticonvulsants:  - Opioids:     History:    Current Outpatient Medications:     amLODIPine (NORVASC) 5 MG tablet, Take 5 mg by mouth once daily., Disp: , Rfl:     levothyroxine (SYNTHROID, LEVOTHROID) 175 MCG tablet, , Disp: , Rfl:      nystatin (MYCOSTATIN) cream, 3 (three) times daily. Apply to affected area, Disp: , Rfl:     ondansetron (ZOFRAN-ODT) 4 MG TbDL, 4 mg every 6 (six) hours as needed., Disp: , Rfl:     rosuvastatin (CRESTOR) 10 MG tablet, Take 10 mg by mouth nightly., Disp: , Rfl:     traMADoL (ULTRAM) 50 mg tablet, Take 1 tablet (50 mg total) by mouth every 8 (eight) hours as needed for Pain., Disp: 21 tablet, Rfl: 0    Past Medical History:   Diagnosis Date    HTN (hypertension)     Hyperlipidemia     Hypothyroidism     Osteoporosis        Past Surgical History:   Procedure Laterality Date    BREAST BIOPSY Left     core Dr. Hannah benign over 5 yrs ago    EPIDURAL STEROID INJECTION INTO LUMBAR SPINE N/A 1/3/2022    Procedure: Injection-steroid-epidural-lumbar L5/S1;  Surgeon: Yobani Nieves MD;  Location: [facility] OR;  Service: Pain Management;  Laterality: N/A;    EPIDURAL STEROID INJECTION INTO LUMBAR SPINE N/A 9/21/2022    Procedure: Injection-steroid-epidural-lumbar L5/S1;  Surgeon: Yobani Nieves MD;  Location: [facility] OR;  Service: Pain Management;  Laterality: N/A;    EPIDURAL STEROID INJECTION INTO LUMBAR SPINE N/A 4/26/2023    Procedure: Injection-steroid-epidural-lumbar L5/S1;  Surgeon: Yobani Nieves MD;  Location: [facility] OR;  Service: Pain Management;  Laterality: N/A;    EPIDURAL STEROID INJECTION INTO LUMBAR SPINE N/A 6/5/2023    Procedure: Injection-steroid-epidural-lumbar L4/L5;  Surgeon: Yobani Nieves MD;  Location: [facility] OR;  Service: Pain Management;  Laterality: N/A;    EPIDURAL STEROID INJECTION INTO LUMBAR SPINE N/A 9/5/2023    Procedure: Injection-steroid-epidural-lumbar L4/5;  Surgeon: Yobani Nieves MD;  Location: [facility] OR;  Service: Pain Management;  Laterality: N/A;  L4/5    HYSTERECTOMY         Family History   Problem Relation Age of Onset    Uterine cancer Mother     Breast cancer Paternal Aunt        Social History     Socioeconomic History    Marital status:    Tobacco Use    Smoking status: Never     Smokeless tobacco: Never   Substance and Sexual Activity    Alcohol use: Not Currently    Sexual activity: Not Currently       Review of patient's allergies indicates:   Allergen Reactions    Bactrim [sulfamethoxazole-trimethoprim] Itching       Review of Systems:  General ROS: negative for - fever  Psychological ROS: negative for - hostility  Hematological and Lymphatic ROS: negative for - bleeding problems  Endocrine ROS: negative for - unexpected weight changes  Respiratory ROS: no cough, shortness of breath, or wheezing  Cardiovascular ROS: no chest pain or dyspnea on exertion  Gastrointestinal ROS: no abdominal pain, change in bowel habits, or black or bloody stools  Musculoskeletal ROS: negative for - muscular weakness  Neurological ROS: positive for - numbness/tingling  Dermatological ROS: negative for rash    Physical Exam:  Vitals:    11/17/23 0957   BP: (!) 156/74   Pulse: 83   Weight: (!) 138.3 kg (305 lb)   Height: 5' (1.524 m)     Body mass index is 59.57 kg/m².     Gen: NAD  Gait:  Presents in wheelchair.  Can ambulate on own.  Psych:  Mood appropriate for given condition  HEENT: eyes anicteric   GI: Abd soft  CV: RRR  Lungs: breathing unlabored   ROM: limited AROM of the L spine in all planes, full ROM at ankles, knees and hips    Sensation: intact to light touch in all dermatomes tested from L2-S1 bilaterally  Reflexes:   Palpation: Diffusely tender over lumbar paraspinals   Tone: normal in the b/l knees and hips   Skin: intact  Extremities: No edema in b/l ankles or hands  Provacative tests:  Minimal tenderness to palpation over the lower lumbar spine       Right Left   L2/3 Iliacus Hip flexion  5  5   L3/4 Qudratus Femoris Knee Extension  5  5   L4/5 Tib Anterior Ankle Dorsiflexion   5  5   L5/S1 Extensor Hallicus Longus Great toe extension  5  5                 S1/S2 Gastroc/Soleus Plantar Flexion  5  5       Imaging:  MRI lumbar spine 05/13/2023  FINDINGS:  Alignment: Some rightward convexity  of the lumbar spine noted.  There is slight grade 1 anterolisthesis of L4 on L5.     Vertebrae: Multiple small Schmorl's nodes and mild multilevel degenerative endplate changes are present.  Vertebral body heights are otherwise within normal limits.  No evidence of fracture or marrow replacement process.     Discs: There is generalized disc desiccation throughout the lumbar spine with moderate disc height loss at L2-3 and L3-4.  Suspect mild disc height loss at L1-2.     Cord: Normal.  Conus terminates at L2.     Degenerative findings:     T12-L1: Minimal posterior disc bulge. No spinal canal or neuroforaminal stenosis.  L1-L2: Generalized disc bulge, slightly asymmetric to left.  Thickening of the ligamentum flavum.  Mild spinal canal stenosis with severe left and moderate right neuroforaminal narrowing.  L2-L3: Generalized disc bulge, asymmetric to the right.  Thickening of the ligamentum flavum.  Mild-to-moderate spinal canal stenosis with severe right and moderate left neuroforaminal narrowing.  L3-L4: Right paracentral disc extrusion superimposed upon more generalized disc bulge.  Mild bilateral facet arthropathy and ligamentum flavum thickening.  Moderate spinal canal stenosis with severe right and moderate left neuroforaminal narrowing.  L4-L5: Severe bilateral facet arthropathy.  Broad-based disc bulge/uncovering of the intervertebral disc and thickening of the ligamentum flavum.  Moderate spinal canal stenosis with moderate right and mild-to-moderate left neural foraminal narrowing.  L5-S1: Severe bilateral facet arthropathy.  Generalized disc bulge.  Mild crowding of the right lateral recess, otherwise no spinal canal stenosis.  Moderate left and mild right neuroforaminal narrowing.     Paraspinal muscles & soft tissues: Unremarkable.    Xray lumbar spine 6/20/23  FINDINGS:  Rotatory levoconvex curvature of the lumbar spine.  Osseous structures demonstrate diffuse demineralization.  Mild L5 superior  endplate compression deformity with approximately 20% height loss, which appears new in comparison to the prior MRI on 05/13/2023.  Remainder of the vertebral bodies appear to maintain normal height.  Multilevel degenerative endplate change, marginal osteophyte formation and intervertebral disc space narrowing, most pronounced at L1-2, L2-3, and L3-4.  Lower lumbar facet arthropathy.    Xray lumbar spine 8/4/23  FINDINGS:  Stable mild superior endplate compression fracture deformity of L5 compared to 06/20/2023.  Diffuse bony demineralization without evidence of any new or progressive fractures.  Lower lumbar predominant facet arthrosis and trace anterolisthesis of L4 on L5 redemonstrated.  Degenerative changes most notable at the level of the visualized lower thoracic spine and disc height loss throughout the lumbar spine which is moderate at L3-L4 and L2-L3.  Broad levocurvature.    MRI lumbar spine 08/21/2023  FINDINGS:  In the interval since the prior examination there appears to have been the development of compression deformity in the L5 vertebral body with approximately 25-50% vertebral body height loss.  The relative rapid development since the prior favors a posttraumatic process to a neoplastic process.  Please clinically correlate.  This appears to predominantly involve the superior endplate of the L5 vertebral body.  There does not appear to be cortical disruption to suggest an infectious osteomyelitis process which can sometimes appear similar.     The spinal cord appears to terminate at the L1-L2 level.     Collateral vessels are partially visualized in the left upper abdomen, these collateral vessels can be from multiple etiologies including splenorenal shunting associated with liver disease and or portal hypertension.  Please clinically correlate.     At the T11-T12 level, minimal disc bulge is noted without significant central canal stenosis or neural foraminal stenosis.  A similar appearance is  noted on the prior.  At the T12-L1 level, no significant disc bulge, central canal stenosis, or neural foraminal stenosis is noted.  At the L1-L2 level, facet arthropathy and ligamentous hypertrophy is noted.  Broad-based bulging is noted towards the left neural foramen greater than right of 6 mm.  Mild right neural foraminal narrowing is noted.  Moderate left neural foraminal stenosis appears to be present with probable contact of the exiting nerve root on the left likely without significant change since the prior.  Facet arthropathy and ligamentous hypertrophy is noted.  Moderate thecal sac narrowing is noted with the thecal sac narrowed to 9 mm.  Very little fluid surrounds the cord at this level  At the L2-L3 level, facet arthropathy and ligamentous hypertrophy appears to be present.  Disc protrusion into the right lateral recess and neural foramen is noted of 5 mm.  Epidural fat is noted.  Bunching of the nerve roots is noted with very little fluid surrounding the nerve roots and moderate thecal sac stenosis to 8 mm with a similar appearance on the prior.  Broad-based uncovertebral spurring is noted bilaterally with moderate to severe right neural foraminal stenosis and probable contact of the exiting nerve root.  Mild to moderate left neural foraminal stenosis is noted.  The exiting nerve root probably exit unimpinged.  A similar appearance is suspected from prior  At the L3-L4 level, disc protrusion appears to be present into the right and left lateral recesses extending 10 mm posterior to the disc plane.  Facet arthropathy and ligamentous hypertrophy is noted.  Some epidural fat is noted.  There is bunching of the nerve roots and severe thecal sac narrowing of 5-6 mm.  Moderate to severe right neural foraminal stenosis is noted with moderate left neural foraminal stenosis.  The exiting nerve root particularly on the right may be affected.  Mild progression of central canal narrowing may be present since the  prior.  At the L4-L5 level, facet arthropathy and ligamentous hypertrophy appears to be present.  Mild broad-based bulge is noted.  Moderate bilateral neural foraminal stenosis is noted.  Contact of the exiting nerve roots may be present.  Some bunching of the nerve roots is noted with epidural fat.  Moderate to severe thecal sac narrowing is noted the 6 mm.  Moderate bilateral neural foraminal stenosis is noted with probable contact of the exiting nerve roots bilaterally.  The central canal narrowing at this level appears progressed since the prior  At the L5-S1 level, broad-based bulging is noted towards each neural foramen with mild to moderate neural foraminal narrowing similar to on the prior.  Minimal central canal narrowing is noted with thecal sac measuring approximately 10 mm unchanged since prior.     Impression:     1. Interval compression fracture at the L5 level is suspected since the MRI of 05/13/2023.  Please clinically correlate.  Infectious or neoplastic processes would seem much less likely but can appear similar under the proper clinical scenario  2. Central canal stenosis particularly at the L3-L4 level where a large protrusion is present with slight progression suspected since the prior.  Central canal narrowing also is favored to be progressed at the L4-L5 level since the prior.  3. collateral vessels are noted in the left upper quadrant of the abdomen possibly relating to splenorenal shunting and portal hypertension.  Please clinically correlate for possible liver disease    Labs:  BMP  Lab Results   Component Value Date     10/10/2023    K 4.4 10/10/2023     10/10/2023    CO2 33 (H) 10/10/2023    BUN 20 (H) 10/10/2023    CREATININE 1.04 10/10/2023    CALCIUM 8.9 10/10/2023    ANIONGAP 2 (L) 10/10/2023     Lab Results   Component Value Date    ALT 28 10/10/2023    AST 54 (H) 10/10/2023    ALKPHOS 178 (H) 10/10/2023    BILITOT 1.7 (H) 10/10/2023       Assessment:   Problem List  Items Addressed This Visit    None  Visit Diagnoses       Spinal stenosis of lumbar region with neurogenic claudication    -  Primary    Dorsalgia, unspecified        Lumbar radiculopathy        Lumbar spondylosis        DDD (degenerative disc disease), lumbar                68 y.o. year old female presents to the office with back pain, constant, 6/10, radiating down the back of both legs.  + numbness, no weakness.    11/17/2023: Safia Ibarra returns to the office for follow up.  Today she is reporting return of her lower back pain, 5/10, across her lower back, with radiation down right leg.  Pain is worsened with physical activities such as standing and walking, cooking, cleaning, somewhat improved with rest.  She denies any numbness, liliana weakness or any new changes to her bowel or bladder function.  She reports previous epidural lasted roughly 1 month and then she fell and her pain returned.      - she presents in wheelchair.  On exam she is full strength in her lower extremities.  - we reviewed her lumbar MRI in office today and is consistent with severe central canal stenosis particularly at the L3-L4 level where a large protrusion is present with slight progression suspected since the prior.  Central canal narrowing also is favored to be progressed at the L4-L5 level since the prior.  - I believe her lumbar radicular pain and pain from her severe central canal stenosis has returned.  - this pain is limiting her mobility interfering with her ADLs and quality of life.  - at this time, she is failed to find significant lasting relief with now 3 epidurals this year.  I do not believe she would find lasting relief with repeat epidural.  - we will have her follow-up with Neurosurgery for further evaluation.  If they think it is reasonable to try 1 more epidural can consider.  - follow up as needed.  If indicated can potentially consider repeat epidural.  If she is not a surgical candidate she may benefit  from vertiflex procedure  - she is requesting we fill out disability paperwork.      :  Reviewed    The above note was completed, in part, with the aid of Dragon dictation software/hardware. Translation errors may be present.    The total time spent for evaluation and management on 11/17/2023 including reviewing separately obtained history, performing a medically appropriate exam and evaluation, documenting clinical information in the health record, independently interpreting results and communicating them to the patient/family/caregiver, and ordering medications/tests/procedures was between 30-39 minutes.

## 2023-11-27 ENCOUNTER — HOSPITAL ENCOUNTER (OUTPATIENT)
Dept: RADIOLOGY | Facility: HOSPITAL | Age: 68
Discharge: HOME | End: 2023-11-27
Attending: NURSE PRACTITIONER
Payer: COMMERCIAL

## 2023-11-27 ENCOUNTER — OFFICE VISIT (OUTPATIENT)
Dept: NEUROSURGERY | Facility: CLINIC | Age: 68
End: 2023-11-27
Payer: COMMERCIAL

## 2023-11-27 VITALS — DIASTOLIC BLOOD PRESSURE: 81 MMHG | HEART RATE: 80 BPM | TEMPERATURE: 98 F | SYSTOLIC BLOOD PRESSURE: 142 MMHG

## 2023-11-27 DIAGNOSIS — M54.16 LUMBAR RADICULOPATHY: ICD-10-CM

## 2023-11-27 DIAGNOSIS — M51.36 DDD (DEGENERATIVE DISC DISEASE), LUMBAR: ICD-10-CM

## 2023-11-27 DIAGNOSIS — R93.89 ABNORMAL MRI: ICD-10-CM

## 2023-11-27 DIAGNOSIS — M41.9 SCOLIOSIS, UNSPECIFIED SCOLIOSIS TYPE, UNSPECIFIED SPINAL REGION: ICD-10-CM

## 2023-11-27 DIAGNOSIS — R93.89 ABNORMAL MRI: Primary | ICD-10-CM

## 2023-11-27 PROCEDURE — 1159F MED LIST DOCD IN RCRD: CPT | Mod: CPTII,S$GLB,, | Performed by: NURSE PRACTITIONER

## 2023-11-27 PROCEDURE — 72082 XR SCOLIOSIS COMPLETE: ICD-10-PCS | Mod: 26,,, | Performed by: RADIOLOGY

## 2023-11-27 PROCEDURE — 72120 X-RAY BEND ONLY L-S SPINE: CPT | Mod: 26,,, | Performed by: RADIOLOGY

## 2023-11-27 PROCEDURE — 1100F PTFALLS ASSESS-DOCD GE2>/YR: CPT | Mod: CPTII,S$GLB,, | Performed by: NURSE PRACTITIONER

## 2023-11-27 PROCEDURE — 1125F PR PAIN SEVERITY QUANTIFIED, PAIN PRESENT: ICD-10-PCS | Mod: CPTII,S$GLB,, | Performed by: NURSE PRACTITIONER

## 2023-11-27 PROCEDURE — 1160F RVW MEDS BY RX/DR IN RCRD: CPT | Mod: CPTII,S$GLB,, | Performed by: NURSE PRACTITIONER

## 2023-11-27 PROCEDURE — 3288F FALL RISK ASSESSMENT DOCD: CPT | Mod: CPTII,S$GLB,, | Performed by: NURSE PRACTITIONER

## 2023-11-27 PROCEDURE — 1125F AMNT PAIN NOTED PAIN PRSNT: CPT | Mod: CPTII,S$GLB,, | Performed by: NURSE PRACTITIONER

## 2023-11-27 PROCEDURE — 3079F PR MOST RECENT DIASTOLIC BLOOD PRESSURE 80-89 MM HG: ICD-10-PCS | Mod: CPTII,S$GLB,, | Performed by: NURSE PRACTITIONER

## 2023-11-27 PROCEDURE — 72120 X-RAY BEND ONLY L-S SPINE: CPT | Mod: 59,TC

## 2023-11-27 PROCEDURE — 3079F DIAST BP 80-89 MM HG: CPT | Mod: CPTII,S$GLB,, | Performed by: NURSE PRACTITIONER

## 2023-11-27 PROCEDURE — 3288F PR FALLS RISK ASSESSMENT DOCUMENTED: ICD-10-PCS | Mod: CPTII,S$GLB,, | Performed by: NURSE PRACTITIONER

## 2023-11-27 PROCEDURE — 99214 PR OFFICE/OUTPT VISIT, EST, LEVL IV, 30-39 MIN: ICD-10-PCS | Mod: S$GLB,,, | Performed by: NURSE PRACTITIONER

## 2023-11-27 PROCEDURE — 99214 OFFICE O/P EST MOD 30 MIN: CPT | Mod: S$GLB,,, | Performed by: NURSE PRACTITIONER

## 2023-11-27 PROCEDURE — 99999 PR PBB SHADOW E&M-EST. PATIENT-LVL IV: ICD-10-PCS | Mod: PBBFAC,,, | Performed by: NURSE PRACTITIONER

## 2023-11-27 PROCEDURE — 99999 PR PBB SHADOW E&M-EST. PATIENT-LVL IV: CPT | Mod: PBBFAC,,, | Performed by: NURSE PRACTITIONER

## 2023-11-27 PROCEDURE — 1160F PR REVIEW ALL MEDS BY PRESCRIBER/CLIN PHARMACIST DOCUMENTED: ICD-10-PCS | Mod: CPTII,S$GLB,, | Performed by: NURSE PRACTITIONER

## 2023-11-27 PROCEDURE — 72082 X-RAY EXAM ENTIRE SPI 2/3 VW: CPT | Mod: TC

## 2023-11-27 PROCEDURE — 3077F SYST BP >= 140 MM HG: CPT | Mod: CPTII,S$GLB,, | Performed by: NURSE PRACTITIONER

## 2023-11-27 PROCEDURE — 3077F PR MOST RECENT SYSTOLIC BLOOD PRESSURE >= 140 MM HG: ICD-10-PCS | Mod: CPTII,S$GLB,, | Performed by: NURSE PRACTITIONER

## 2023-11-27 PROCEDURE — 1159F PR MEDICATION LIST DOCUMENTED IN MEDICAL RECORD: ICD-10-PCS | Mod: CPTII,S$GLB,, | Performed by: NURSE PRACTITIONER

## 2023-11-27 PROCEDURE — 72120 XR LUMBAR SPINE FLEXION AND EXTENSION ONLY: ICD-10-PCS | Mod: 26,,, | Performed by: RADIOLOGY

## 2023-11-27 PROCEDURE — 1100F PR PT FALLS ASSESS DOC 2+ FALLS/FALL W/INJURY/YR: ICD-10-PCS | Mod: CPTII,S$GLB,, | Performed by: NURSE PRACTITIONER

## 2023-11-27 PROCEDURE — 72082 X-RAY EXAM ENTIRE SPI 2/3 VW: CPT | Mod: 26,,, | Performed by: RADIOLOGY

## 2023-11-28 ENCOUNTER — TELEPHONE (OUTPATIENT)
Dept: ENDOCRINOLOGY | Facility: CLINIC | Age: 68
End: 2023-11-28
Payer: MEDICARE

## 2023-11-28 DIAGNOSIS — S32.050A COMPRESSION FRACTURE OF L5 VERTEBRA, INITIAL ENCOUNTER: Primary | ICD-10-CM

## 2023-11-28 DIAGNOSIS — M85.80 OSTEOPENIA, UNSPECIFIED LOCATION: ICD-10-CM

## 2023-11-28 NOTE — TELEPHONE ENCOUNTER
S/w pt. Scheduled new pt appt w/ Radha Mane PA-C for osteopenia. Verbalized understanding of date/time/location.

## 2023-11-28 NOTE — TELEPHONE ENCOUNTER
----- Message from Diamone Speed sent at 11/28/2023 12:31 PM CST -----  Regarding: self  Type: Patient Call Back       Who called: self        What is the request in detail: pt needs to get appt schedule and need a a call back        Can the clinic reply by [facility]? Yes       Would the patient rather a call back or a response via [facility]? Call back       Best call back number: [phone]

## 2023-12-12 ENCOUNTER — TELEPHONE (OUTPATIENT)
Dept: NEUROSURGERY | Facility: CLINIC | Age: 68
End: 2023-12-12
Payer: MEDICARE

## 2023-12-12 NOTE — TELEPHONE ENCOUNTER
Appt scheduled for 2/15. Pt thanked me for calling  Future Appointments   Date Time Provider Department Center   12/18/2023 10:00 AM [facility] MRI2 [facility] [place]   12/18/2023 10:30 AM [facility] MRI1 [facility] [place]   12/26/2023  2:00 PM [facility] DEXA1 [facility] BMD [place]   2/14/2024  2:45 PM Tre Celeste [facility]   2/15/2024 11:00 AM Jeb Tavera MD [facility] NEUROS8 [facility]   2/22/2024 11:00 AM Martine Hernandez NP [facility] HEPAT [place]   3/12/2024  3:00 PM CARTER Mane PA-C [facility] ENDOCRN [place]

## 2023-12-18 ENCOUNTER — HOSPITAL ENCOUNTER (OUTPATIENT)
Dept: RADIOLOGY | Facility: HOSPITAL | Age: 68
Discharge: HOME | End: 2023-12-18
Attending: NURSE PRACTITIONER
Payer: COMMERCIAL

## 2023-12-18 DIAGNOSIS — R93.89 ABNORMAL MRI: ICD-10-CM

## 2023-12-18 DIAGNOSIS — M41.9 SCOLIOSIS, UNSPECIFIED SCOLIOSIS TYPE, UNSPECIFIED SPINAL REGION: ICD-10-CM

## 2023-12-18 PROCEDURE — 72146 MRI CHEST SPINE W/O DYE: CPT | Mod: 26,,, | Performed by: RADIOLOGY

## 2023-12-18 PROCEDURE — 72146 MRI CHEST SPINE W/O DYE: CPT | Mod: TC,PO

## 2023-12-18 PROCEDURE — 72141 MRI NECK SPINE W/O DYE: CPT | Mod: 26,,, | Performed by: RADIOLOGY

## 2023-12-18 PROCEDURE — 72146 MRI THORACIC SPINE WITHOUT CONTRAST: ICD-10-PCS | Mod: 26,,, | Performed by: RADIOLOGY

## 2023-12-18 PROCEDURE — 72141 MRI NECK SPINE W/O DYE: CPT | Mod: TC,PO

## 2023-12-18 PROCEDURE — 72141 MRI CERVICAL SPINE WITHOUT CONTRAST: ICD-10-PCS | Mod: 26,,, | Performed by: RADIOLOGY

## 2023-12-26 ENCOUNTER — HOSPITAL ENCOUNTER (OUTPATIENT)
Dept: RADIOLOGY | Facility: HOSPITAL | Age: 68
Discharge: HOME | End: 2023-12-26
Attending: NURSE PRACTITIONER
Payer: COMMERCIAL

## 2023-12-26 DIAGNOSIS — S32.050A COMPRESSION FRACTURE OF L5 VERTEBRA, INITIAL ENCOUNTER: ICD-10-CM

## 2023-12-26 DIAGNOSIS — M85.80 OSTEOPENIA, UNSPECIFIED LOCATION: ICD-10-CM

## 2023-12-26 PROCEDURE — 77080 DXA BONE DENSITY AXIAL SKELETON 1 OR MORE SITES: ICD-10-PCS | Mod: 26,,, | Performed by: RADIOLOGY

## 2023-12-26 PROCEDURE — 77080 DXA BONE DENSITY AXIAL: CPT | Mod: 26,,, | Performed by: RADIOLOGY

## 2023-12-26 PROCEDURE — 77080 DXA BONE DENSITY AXIAL: CPT | Mod: TC,PO

## 2024-01-11 DIAGNOSIS — M54.16 LUMBAR RADICULOPATHY: ICD-10-CM

## 2024-01-16 RX ORDER — TRAMADOL HYDROCHLORIDE 50 MG/1
50 TABLET ORAL EVERY 8 HOURS PRN
Qty: 21 TABLET | Refills: 0 | Status: ON HOLD | OUTPATIENT
Start: 2024-01-16 | End: 2024-03-05

## 2024-01-18 ENCOUNTER — PATIENT MESSAGE (OUTPATIENT)
Dept: PAIN MEDICINE | Facility: CLINIC | Age: 69
End: 2024-01-18
Payer: MEDICARE

## 2024-01-26 NOTE — TELEPHONE ENCOUNTER
We need to updated her documentation.  Please offer her a follow up with me at her earliest convenience.  Ok to double book on my schedule.

## 2024-02-05 ENCOUNTER — PATIENT MESSAGE (OUTPATIENT)
Dept: NEUROSURGERY | Facility: CLINIC | Age: 69
End: 2024-02-05
Payer: MEDICARE

## 2024-02-14 ENCOUNTER — OFFICE VISIT (OUTPATIENT)
Dept: CARDIOLOGY | Facility: CLINIC | Age: 69
End: 2024-02-14
Payer: COMMERCIAL

## 2024-02-14 VITALS
BODY MASS INDEX: 55.15 KG/M2 | WEIGHT: 282.44 LBS | DIASTOLIC BLOOD PRESSURE: 65 MMHG | SYSTOLIC BLOOD PRESSURE: 136 MMHG | RESPIRATION RATE: 18 BRPM | HEART RATE: 73 BPM

## 2024-02-14 DIAGNOSIS — I10 PRIMARY HYPERTENSION: Chronic | ICD-10-CM

## 2024-02-14 DIAGNOSIS — E66.01 CLASS 3 SEVERE OBESITY DUE TO EXCESS CALORIES WITH SERIOUS COMORBIDITY AND BODY MASS INDEX (BMI) OF 50.0 TO 59.9 IN ADULT: Chronic | ICD-10-CM

## 2024-02-14 DIAGNOSIS — I27.20 MILD PULMONARY HYPERTENSION: ICD-10-CM

## 2024-02-14 DIAGNOSIS — I08.0 MITRAL REGURGITATION AND AORTIC STENOSIS: Primary | Chronic | ICD-10-CM

## 2024-02-14 DIAGNOSIS — I49.49 PREMATURE BEATS: ICD-10-CM

## 2024-02-14 DIAGNOSIS — E78.00 HYPERCHOLESTEROLEMIA: Chronic | ICD-10-CM

## 2024-02-14 PROCEDURE — 99214 OFFICE O/P EST MOD 30 MIN: CPT | Mod: S$GLB,,, | Performed by: INTERNAL MEDICINE

## 2024-02-14 PROCEDURE — 3288F FALL RISK ASSESSMENT DOCD: CPT | Mod: CPTII,S$GLB,, | Performed by: INTERNAL MEDICINE

## 2024-02-14 PROCEDURE — 1101F PT FALLS ASSESS-DOCD LE1/YR: CPT | Mod: CPTII,S$GLB,, | Performed by: INTERNAL MEDICINE

## 2024-02-14 PROCEDURE — 3008F BODY MASS INDEX DOCD: CPT | Mod: CPTII,S$GLB,, | Performed by: INTERNAL MEDICINE

## 2024-02-14 PROCEDURE — 4010F ACE/ARB THERAPY RXD/TAKEN: CPT | Mod: CPTII,S$GLB,, | Performed by: INTERNAL MEDICINE

## 2024-02-14 PROCEDURE — 3075F SYST BP GE 130 - 139MM HG: CPT | Mod: CPTII,S$GLB,, | Performed by: INTERNAL MEDICINE

## 2024-02-14 PROCEDURE — 3078F DIAST BP <80 MM HG: CPT | Mod: CPTII,S$GLB,, | Performed by: INTERNAL MEDICINE

## 2024-02-14 PROCEDURE — 1159F MED LIST DOCD IN RCRD: CPT | Mod: CPTII,S$GLB,, | Performed by: INTERNAL MEDICINE

## 2024-02-14 PROCEDURE — 1126F AMNT PAIN NOTED NONE PRSNT: CPT | Mod: CPTII,S$GLB,, | Performed by: INTERNAL MEDICINE

## 2024-02-14 RX ORDER — GABAPENTIN 300 MG/1
300 CAPSULE ORAL 2 TIMES DAILY
COMMUNITY
Start: 2024-01-24

## 2024-02-14 RX ORDER — CIPROFLOXACIN 500 MG/1
500 TABLET ORAL 2 TIMES DAILY
Status: ON HOLD | COMMUNITY
Start: 2023-11-06 | End: 2024-05-01

## 2024-02-14 RX ORDER — PANTOPRAZOLE SODIUM 40 MG/1
40 TABLET, DELAYED RELEASE ORAL EVERY MORNING
COMMUNITY

## 2024-02-14 RX ORDER — MELOXICAM 15 MG/1
15 TABLET ORAL DAILY PRN
COMMUNITY
Start: 2023-10-31 | End: 2024-05-29 | Stop reason: SDUPTHER

## 2024-02-14 RX ORDER — LIDOCAINE 50 MG/G
1 PATCH TOPICAL DAILY
COMMUNITY
Start: 2024-01-24

## 2024-02-14 RX ORDER — LOSARTAN POTASSIUM 25 MG/1
12.5 TABLET ORAL NIGHTLY
Qty: 45 TABLET | Refills: 2 | Status: SHIPPED | OUTPATIENT
Start: 2024-02-14 | End: 2025-02-13

## 2024-02-14 NOTE — PROGRESS NOTES
Subjective:    Patient ID:  Safia Ibarra is a 68 y.o. female who presents for Mitral regurgitation and aortic stenosis 01/04/2023 SOB (sh        HPI  RECENT LABS NOTED,CMP  OK, HB 11.7, LDL 31  ,HDL 53 , TG 88, WORSENING BACK PAIN TO SEE NEURO SURGERY, DOING OK, CARDIAC WISE,NOT TAKING BP MEDS MOST DAYS, WAS TAKING LYRICA, WHICH LOWERED HER BLOOD PRESSURE,  SEE ROS  Past Medical History:   Diagnosis Date    HTN (hypertension)     Hyperlipidemia     Hypothyroidism     Osteoporosis      Past Surgical History:   Procedure Laterality Date    BREAST BIOPSY Left     core Dr. Hannah benign over 5 yrs ago    EPIDURAL STEROID INJECTION INTO LUMBAR SPINE N/A 1/3/2022    Procedure: Injection-steroid-epidural-lumbar L5/S1;  Surgeon: Yobani Nieves MD;  Location: [facility] OR;  Service: Pain Management;  Laterality: N/A;    EPIDURAL STEROID INJECTION INTO LUMBAR SPINE N/A 9/21/2022    Procedure: Injection-steroid-epidural-lumbar L5/S1;  Surgeon: Yobani Nieves MD;  Location: [facility] OR;  Service: Pain Management;  Laterality: N/A;    EPIDURAL STEROID INJECTION INTO LUMBAR SPINE N/A 4/26/2023    Procedure: Injection-steroid-epidural-lumbar L5/S1;  Surgeon: Yobani Nieves MD;  Location: [facility] OR;  Service: Pain Management;  Laterality: N/A;    EPIDURAL STEROID INJECTION INTO LUMBAR SPINE N/A 6/5/2023    Procedure: Injection-steroid-epidural-lumbar L4/L5;  Surgeon: Yobani Nieves MD;  Location: [facility] OR;  Service: Pain Management;  Laterality: N/A;    EPIDURAL STEROID INJECTION INTO LUMBAR SPINE N/A 9/5/2023    Procedure: Injection-steroid-epidural-lumbar L4/5;  Surgeon: Yobani Nieves MD;  Location: [facility] OR;  Service: Pain Management;  Laterality: N/A;  L4/5    HYSTERECTOMY       Family History   Problem Relation Age of Onset    Uterine cancer Mother     Breast cancer Paternal Aunt      Social History     Socioeconomic History    Marital status:    Tobacco Use    Smoking status: Never    Smokeless tobacco: Never    Substance and Sexual Activity    Alcohol use: Not Currently    Drug use: Never    Sexual activity: Not Currently       Review of patient's allergies indicates:   Allergen Reactions    Bactrim [sulfamethoxazole-trimethoprim] Itching       Current Outpatient Medications:     ciprofloxacin HCl (CIPRO) 500 MG tablet, Take 500 mg by mouth 2 (two) times daily., Disp: , Rfl:     gabapentin (NEURONTIN) 300 MG capsule, Take 300 mg by mouth 2 (two) times daily., Disp: , Rfl:     levothyroxine (SYNTHROID, LEVOTHROID) 175 MCG tablet, , Disp: , Rfl:     LIDOcaine (LIDODERM) 5 %, Place 1 patch onto the skin once daily., Disp: , Rfl:     meloxicam (MOBIC) 15 MG tablet, Take 15 mg by mouth daily as needed for Pain., Disp: , Rfl:     nystatin (MYCOSTATIN) cream, 3 (three) times daily. Apply to affected area, Disp: , Rfl:     ondansetron (ZOFRAN-ODT) 4 MG TbDL, 4 mg every 6 (six) hours as needed., Disp: , Rfl:     pantoprazole (PROTONIX) 40 MG tablet, Take 40 mg by mouth every morning., Disp: , Rfl:     rosuvastatin (CRESTOR) 10 MG tablet, Take 10 mg by mouth nightly., Disp: , Rfl:     traMADoL (ULTRAM) 50 mg tablet, Take 1 tablet (50 mg total) by mouth every 8 (eight) hours as needed for Pain., Disp: 21 tablet, Rfl: 0    amLODIPine (NORVASC) 5 MG tablet, Take 5 mg by mouth once daily., Disp: , Rfl:     losartan (COZAAR) 25 MG tablet, Take 0.5 tablets (12.5 mg total) by mouth every evening., Disp: 45 tablet, Rfl: 2    Review of Systems   Constitutional: Negative for chills, diaphoresis, fever, malaise/fatigue and night sweats.   HENT:  Negative for congestion and nosebleeds.    Eyes:  Negative for blurred vision and redness.   Cardiovascular:  Positive for dyspnea on exertion (WEIGHT). Negative for chest pain, claudication, cyanosis, irregular heartbeat, leg swelling, near-syncope, orthopnea, palpitations, paroxysmal nocturnal dyspnea and syncope.   Respiratory:  Negative for cough, hemoptysis, shortness of breath and wheezing.     Endocrine: Negative for polyuria.   Hematologic/Lymphatic: Negative for adenopathy. Does not bruise/bleed easily.   Skin:  Negative for color change and itching.   Musculoskeletal:  Positive for back pain and muscle weakness (LEGS). Negative for falls.   Gastrointestinal:  Negative for abdominal pain, change in bowel habit, dysphagia, jaundice, melena and nausea.   Genitourinary:  Negative for dysuria and flank pain.   Neurological:  Negative for brief paralysis, dizziness, focal weakness, loss of balance and numbness.   Psychiatric/Behavioral:  Negative for altered mental status and depression.    Allergic/Immunologic: Negative for persistent infections.        Objective:      Vitals:    02/14/24 1450   BP: 136/65   Pulse: 73   Resp: 18   Weight: 128.1 kg (282 lb 6.6 oz)   PainSc: 0-No pain     Body mass index is 55.15 kg/m².    Physical Exam  Constitutional:       Appearance: She is morbidly obese.   Cardiovascular:      Rate and Rhythm: Normal rate and regular rhythm. No extrasystoles are present.     Pulses:           Carotid pulses are 2+ on the right side and 2+ on the left side.       Radial pulses are 2+ on the right side and 2+ on the left side.        Posterior tibial pulses are 2+ on the right side and 2+ on the left side.      Heart sounds: Murmur heard.      Systolic murmur is present with a grade of 1/6 at the upper right sternal border.   Pulmonary:      Effort: Pulmonary effort is normal.      Breath sounds: Normal breath sounds.   Abdominal:      Palpations: Abdomen is soft.      Tenderness: There is no abdominal tenderness.   Musculoskeletal:      Right lower leg: No edema (TRACE).      Left lower leg: No edema (TRACE).   Neurological:      Mental Status: She is alert and oriented to person, place, and time.      Cranial Nerves: Cranial nerves 2-12 are intact.   Psychiatric:         Mood and Affect: Mood normal.         Speech: Speech normal.         Behavior: Behavior normal.               ..     Chemistry        Component Value Date/Time     10/10/2023 1108    K 4.4 10/10/2023 1108     10/10/2023 1108    CO2 33 (H) 10/10/2023 1108    BUN 20 (H) 10/10/2023 1108    CREATININE 1.04 10/10/2023 1108    GLU 79 10/10/2023 1108        Component Value Date/Time    CALCIUM 8.9 10/10/2023 1108    ALKPHOS 178 (H) 10/10/2023 1108    AST 54 (H) 10/10/2023 1108    ALT 28 10/10/2023 1108    BILITOT 1.7 (H) 10/10/2023 1108            ..  Lab Results   Component Value Date    CHOL 102 (L) 10/10/2023     Lab Results   Component Value Date    HDL 53 10/10/2023     Lab Results   Component Value Date    LDLCALC 31.4 (L) 10/10/2023     Lab Results   Component Value Date    TRIG 88 10/10/2023     Lab Results   Component Value Date    CHOLHDL 52.0 (H) 10/10/2023     ..  Lab Results   Component Value Date    WBC 5.90 10/10/2023    HGB 11.2 (L) 10/10/2023    HCT 35.5 (L) 10/10/2023    MCV 92 10/10/2023     (L) 10/10/2023       Test(s) Reviewed  I have reviewed the following in detail:  [] Stress test   [] Angiography   [] Echocardiogram   [x] Labs   [] Other:       Assessment:         ICD-10-CM ICD-9-CM   1. Mitral regurgitation and aortic stenosis  I08.0 396.2   2. Mild pulmonary hypertension  I27.20 416.8   3. Premature beats  I49.49 427.60   4. Class 3 severe obesity due to excess calories with serious comorbidity and body mass index (BMI) of 50.0 to 59.9 in adult  E66.01 278.01    Z68.43 V85.43   5. Hypercholesterolemia  E78.00 272.0   6. Primary hypertension  I10 401.9     Problem List Items Addressed This Visit          Cardiac/Vascular    Hypercholesterolemia    Primary hypertension    Hypertensive pulmonary vascular disease    Premature beats    Mitral regurgitation and aortic stenosis - Primary       Endocrine    Class 3 severe obesity due to excess calories with serious comorbidity and body mass index (BMI) of 50.0 to 59.9 in adult        Plan:         LOSARTAN LOW-DOSE COMPLIANCE, WATCH BLOOD  PRESSURE,ALL CV CLINICALLY STABLE, NO ANGINA, NO HF, NO TIA, NO CLINICAL ARRHYTHMIA,CONTINUE CURRENT MEDS, EDUCATION, DIET, EXERCISE AS MUCH AS POSSIBLE, NEEDS SIGNIFICANT WEIGHT LOSS, RETURN TO CLINIC IN, 9 MO  Mitral regurgitation and aortic stenosis  Comments:  MILD    Mild pulmonary hypertension    Premature beats    Class 3 severe obesity due to excess calories with serious comorbidity and body mass index (BMI) of 50.0 to 59.9 in adult    Hypercholesterolemia    Primary hypertension    Other orders  -     losartan (COZAAR) 25 MG tablet; Take 0.5 tablets (12.5 mg total) by mouth every evening.  Dispense: 45 tablet; Refill: 2    RTC Low level/low impact aerobic exercise 5x's/wk. Heart healthy diet and risk factor modification.    See labs and med orders.    Aerobic exercise 5x's/wk. Heart healthy diet and risk factor modification.    See labs and med orders.

## 2024-02-15 ENCOUNTER — OFFICE VISIT (OUTPATIENT)
Dept: NEUROSURGERY | Facility: CLINIC | Age: 69
End: 2024-02-15
Payer: COMMERCIAL

## 2024-02-15 VITALS — SYSTOLIC BLOOD PRESSURE: 139 MMHG | HEART RATE: 70 BPM | DIASTOLIC BLOOD PRESSURE: 81 MMHG

## 2024-02-15 DIAGNOSIS — M48.062 SPINAL STENOSIS OF LUMBAR REGION WITH NEUROGENIC CLAUDICATION: Primary | ICD-10-CM

## 2024-02-15 DIAGNOSIS — M54.42 CHRONIC BILATERAL LOW BACK PAIN WITH BILATERAL SCIATICA: ICD-10-CM

## 2024-02-15 DIAGNOSIS — M54.41 CHRONIC BILATERAL LOW BACK PAIN WITH BILATERAL SCIATICA: ICD-10-CM

## 2024-02-15 DIAGNOSIS — M54.16 LUMBAR RADICULOPATHY: ICD-10-CM

## 2024-02-15 DIAGNOSIS — G89.29 CHRONIC BILATERAL LOW BACK PAIN WITH BILATERAL SCIATICA: ICD-10-CM

## 2024-02-15 DIAGNOSIS — E66.01 MORBID OBESITY: ICD-10-CM

## 2024-02-15 PROCEDURE — 3075F SYST BP GE 130 - 139MM HG: CPT | Mod: CPTII,S$GLB,, | Performed by: NEUROLOGICAL SURGERY

## 2024-02-15 PROCEDURE — 1100F PTFALLS ASSESS-DOCD GE2>/YR: CPT | Mod: CPTII,S$GLB,, | Performed by: NEUROLOGICAL SURGERY

## 2024-02-15 PROCEDURE — 4010F ACE/ARB THERAPY RXD/TAKEN: CPT | Mod: CPTII,S$GLB,, | Performed by: NEUROLOGICAL SURGERY

## 2024-02-15 PROCEDURE — 99999 PR PBB SHADOW E&M-EST. PATIENT-LVL III: CPT | Mod: PBBFAC,,, | Performed by: NEUROLOGICAL SURGERY

## 2024-02-15 PROCEDURE — 99213 OFFICE O/P EST LOW 20 MIN: CPT | Mod: S$GLB,,, | Performed by: NEUROLOGICAL SURGERY

## 2024-02-15 PROCEDURE — 3288F FALL RISK ASSESSMENT DOCD: CPT | Mod: CPTII,S$GLB,, | Performed by: NEUROLOGICAL SURGERY

## 2024-02-15 PROCEDURE — 1125F AMNT PAIN NOTED PAIN PRSNT: CPT | Mod: CPTII,S$GLB,, | Performed by: NEUROLOGICAL SURGERY

## 2024-02-15 PROCEDURE — 3079F DIAST BP 80-89 MM HG: CPT | Mod: CPTII,S$GLB,, | Performed by: NEUROLOGICAL SURGERY

## 2024-02-15 NOTE — PROGRESS NOTES
"CHIEF COMPLAINT:  Back pain    I, VIOLET ESTRELLA, attest that this documentation has been prepared under the direction and in the presence of Jeb Tavera MD.    HPI from :  Safia Ibarra is a 68 y.o. female  with HTN, HLD, hypothyroidism, and obesity. She is being seen in clinic today as a referral from Dr. Singletary for Dr. Tavera to discuss surgical options for long standing chronic lumbar radiculopathy with neurogenic claudication despite conservative treatment. The pain is worse with going from sitting to standing. Describes the pain as constant and aching across the low back with radiation into the legs. Rates the pain as a 4/10. Aggravating factors include standing and walking. Alleviating factors include sitting and leaning forward. Denies b/b dysfunction, saddle anesthesia, or gait instability. She is utilizing a wheelchair for her appointment today. Reports tingling in the legs and weakness with prolonged standing.      Interval Hx:   Today pt reports that she has intermittent chronic back pain since June 2023 secondary to a fall and right leg pain that worsens with laying down at night. The pain has been about the same since the fall and starts at her right hip and radiates down to her lower leg. She states that her right leg feels like "it will give out" with standing for long periods with some numbness. She is able to walk about 30-40 yards before she starts having back pain. She uses a walker to ambulate PRN. Denies any weakness or b/b symptoms. She notes that she has done physical therapy. She had injections done with Dr. Yobani Nieves ([facility]) in 2023 with 50% back pain relief.     Review of patient's allergies indicates:   Allergen Reactions    Bactrim [sulfamethoxazole-trimethoprim] Itching       Past Medical History:   Diagnosis Date    HTN (hypertension)     Hyperlipidemia     Hypothyroidism     Osteoporosis      Past Surgical History:   Procedure Laterality Date    BREAST BIOPSY Left     core " Dr. Hannah benign over 5 yrs ago    EPIDURAL STEROID INJECTION INTO LUMBAR SPINE N/A 1/3/2022    Procedure: Injection-steroid-epidural-lumbar L5/S1;  Surgeon: Yobani Nieves MD;  Location: [facility] OR;  Service: Pain Management;  Laterality: N/A;    EPIDURAL STEROID INJECTION INTO LUMBAR SPINE N/A 9/21/2022    Procedure: Injection-steroid-epidural-lumbar L5/S1;  Surgeon: Yobani Nieves MD;  Location: [facility] OR;  Service: Pain Management;  Laterality: N/A;    EPIDURAL STEROID INJECTION INTO LUMBAR SPINE N/A 4/26/2023    Procedure: Injection-steroid-epidural-lumbar L5/S1;  Surgeon: Yobani Nieves MD;  Location: [facility] OR;  Service: Pain Management;  Laterality: N/A;    EPIDURAL STEROID INJECTION INTO LUMBAR SPINE N/A 6/5/2023    Procedure: Injection-steroid-epidural-lumbar L4/L5;  Surgeon: Yobani Nieves MD;  Location: [facility] OR;  Service: Pain Management;  Laterality: N/A;    EPIDURAL STEROID INJECTION INTO LUMBAR SPINE N/A 9/5/2023    Procedure: Injection-steroid-epidural-lumbar L4/5;  Surgeon: Yobani Nieves MD;  Location: [facility] OR;  Service: Pain Management;  Laterality: N/A;  L4/5    HYSTERECTOMY       Family History   Problem Relation Age of Onset    Uterine cancer Mother     Breast cancer Paternal Aunt      Social History     Tobacco Use    Smoking status: Never    Smokeless tobacco: Never   Substance Use Topics    Alcohol use: Not Currently    Drug use: Never        Review of Systems   Musculoskeletal:  Positive for back pain and myalgias.   Neurological:  Positive for numbness.   All other systems reviewed and are negative.      OBJECTIVE:   Vital Signs:  Pulse: 70 (02/15/24 1410)  BP: 139/81 (02/15/24 1410)    Physical Exam:    Vital signs: All nursing notes and vital signs reviewed -- afebrile, vital signs stable.  Constitutional: Patient sitting comfortably in chair. Appears well developed and well nourished.  Skin: Exposed areas are intact without abnormal markings, rashes or other lesions.  HEENT:  Normocephalic. Normal conjunctivae.  Cardiovascular: Normal rate and regular rhythm.  Respiratory: Chest wall rises and falls symmetrically, without signs of respiratory distress.  Abdomen: Soft and non-tender.  Extremities: Warm and without edema. Calves supple, non-tender.  Psych/Behavior: Normal affect.    Neurological:    Mental status: Alert and oriented. Conversational and appropriate.       Cranial Nerves: VFF to confrontation. PERRL. EOMI without nystagmus. Facial STLT normal and symmetric. Strong, symmetric muscles of mastication. Facial strength full and symmetric. Hearing equal bilaterally to finger rub. Palate and uvula rise and fall normally in midline. Shoulder shrug 5/5 strength. Tongue midline.     Motor:    Upper:  Deltoids Triceps Biceps WE WF     R 5/5 5/5 5/5 5/5 5/5 5/5    L 5/5 5/5 5/5 5/5 5/5 5/5      Lower:  HF KE KF DF PF EHL    R 5/5 5/5 5/5 5/5 5/5 5/5    L 5/5 5/5 5/5 5/5 5/5 5/5     Sensory: Intact sensation to light touch in all extremities. Romberg negative.    Reflexes:          DTR: 2+ symmetrically throughout.     Hernandez's: Negative.     Babinski's: Negative.     Clonus: Negative.    Cerebellar: Finger-to-nose and rapid alternating movements normal. Gait stable, fluid.    Spine:    Posture: Head well aligned over pelvis in front and side views.  No focal or global spinal deformity visible on inspection. Shoulders and hips even. No obvious leg length discrepancy. No scapula winging.    Bending: Full ROM with forward, back and lateral bending. No rib prominence with forward bend.    Cervical:      ROM: Full with flexion, extension, lateral rotation and ear-to-shoulder bend.      Midline TTP: Negative.     Spurling's test: Negative.     Lhermitte's: Negative.    Thoracic:     Midline TTP: Negative    Lumbar:     Midline TTP: Negative     Straight Leg Test: Negative     Crossed Straight Leg Test: Negative     Sciatic notch tenderness: Negative.    Other:     SI joint TTP:  Negative.     Greater trochanter TTP: Negative.     Tenderness with external/internal hip rotation: Negative.    Diagnostic Results:  All imaging was independently reviewed by me.    MRI lumbar spine dated 8/21/23, which shows compression fracture at the L5. Multilevel degenerative changes most pronounced L2-5 with central and neural foraminal narrowing.      2.   X-ray lumbar spine dated 6/20/23, which shows rotatory levoconvex curvature of the lumbar spine and multilevel lumbar spondylosis.     3. X-ray C-spine flexion-extension dated 11/27/2023 without abnormal movement    4. X-rays lumbar spine flexion-extension dated 11/27/2023 without abnormal movement    5. Scoliosis standing AP and Lateral X-ray, dated 11/20 07/2023 which shows left lumbar curve apex at L3-4 and right thoracolumbar curve apex at T12-T11.  Sagittally imbalanced.  Coronally balanced    6. C-spine and T-spine MRI dated 12/18/2023 without significant stenosis    7. DEXA scan dated 12/18/2023 showed osteopenia    ASSESSMENT/PLAN:     Safia Ibarra is a 68-year-old female with known L5 compression fracture and multilevel degenerative disc disease from L1 down to L5 causing moderate-to-severe stenosis worst being at L3-4 and L4-5.  She tried conservative management with good relief.  We had long discussion about all options surgical and medical.     The patient understands and agrees with the plan of care. All questions were answered.     1. Continue weight loss program and follow up with PCP  2. Continue conservative management with physical therapy and NATASHA  3. Follow up in 6 months with repeat MRI of the L-spine in August 2024  4. Under consideration thoracolumbar decompression and fusion pending weight loss    I, Dr. Jeb Tavera personally performed the services described in this documentation. All medical record entries made by the scribe, VIOLET ESTRELLA, were at my direction and in my presence.  I have reviewed the chart and agree that  the record reflects my personal performance and is accurate and complete.      Jeb Tavera M.D.  Department of Neurosurgery  [facility]

## 2024-02-19 ENCOUNTER — TELEPHONE (OUTPATIENT)
Dept: NEUROSURGERY | Facility: CLINIC | Age: 69
End: 2024-02-19
Payer: MEDICARE

## 2024-02-19 DIAGNOSIS — M48.062 SPINAL STENOSIS OF LUMBAR REGION WITH NEUROGENIC CLAUDICATION: Primary | ICD-10-CM

## 2024-02-23 ENCOUNTER — OFFICE VISIT (OUTPATIENT)
Dept: PAIN MEDICINE | Facility: CLINIC | Age: 69
End: 2024-02-23
Payer: COMMERCIAL

## 2024-02-23 ENCOUNTER — TELEPHONE (OUTPATIENT)
Dept: PAIN MEDICINE | Facility: CLINIC | Age: 69
End: 2024-02-23

## 2024-02-23 VITALS
DIASTOLIC BLOOD PRESSURE: 65 MMHG | SYSTOLIC BLOOD PRESSURE: 136 MMHG | HEART RATE: 73 BPM | BODY MASS INDEX: 55.45 KG/M2 | WEIGHT: 282.44 LBS | HEIGHT: 60 IN

## 2024-02-23 DIAGNOSIS — M47.816 LUMBAR SPONDYLOSIS: Primary | ICD-10-CM

## 2024-02-23 DIAGNOSIS — M48.062 SPINAL STENOSIS OF LUMBAR REGION WITH NEUROGENIC CLAUDICATION: ICD-10-CM

## 2024-02-23 DIAGNOSIS — M54.16 LUMBAR RADICULOPATHY: ICD-10-CM

## 2024-02-23 DIAGNOSIS — G89.29 CHRONIC BILATERAL LOW BACK PAIN WITH BILATERAL SCIATICA: ICD-10-CM

## 2024-02-23 DIAGNOSIS — M54.41 CHRONIC BILATERAL LOW BACK PAIN WITH BILATERAL SCIATICA: ICD-10-CM

## 2024-02-23 DIAGNOSIS — M54.42 CHRONIC BILATERAL LOW BACK PAIN WITH BILATERAL SCIATICA: ICD-10-CM

## 2024-02-23 PROCEDURE — 99214 OFFICE O/P EST MOD 30 MIN: CPT | Mod: S$GLB,,,

## 2024-02-23 PROCEDURE — 3008F BODY MASS INDEX DOCD: CPT | Mod: CPTII,S$GLB,,

## 2024-02-23 PROCEDURE — 3078F DIAST BP <80 MM HG: CPT | Mod: CPTII,S$GLB,,

## 2024-02-23 PROCEDURE — 1100F PTFALLS ASSESS-DOCD GE2>/YR: CPT | Mod: CPTII,S$GLB,,

## 2024-02-23 PROCEDURE — 1125F AMNT PAIN NOTED PAIN PRSNT: CPT | Mod: CPTII,S$GLB,,

## 2024-02-23 PROCEDURE — 1159F MED LIST DOCD IN RCRD: CPT | Mod: CPTII,S$GLB,,

## 2024-02-23 PROCEDURE — 4010F ACE/ARB THERAPY RXD/TAKEN: CPT | Mod: CPTII,S$GLB,,

## 2024-02-23 PROCEDURE — 3288F FALL RISK ASSESSMENT DOCD: CPT | Mod: CPTII,S$GLB,,

## 2024-02-23 PROCEDURE — 99999 PR PBB SHADOW E&M-EST. PATIENT-LVL III: CPT | Mod: PBBFAC,,,

## 2024-02-23 PROCEDURE — 3075F SYST BP GE 130 - 139MM HG: CPT | Mod: CPTII,S$GLB,,

## 2024-02-23 RX ORDER — SODIUM CHLORIDE, SODIUM LACTATE, POTASSIUM CHLORIDE, CALCIUM CHLORIDE 600; 310; 30; 20 MG/100ML; MG/100ML; MG/100ML; MG/100ML
INJECTION, SOLUTION INTRAVENOUS CONTINUOUS
Status: CANCELLED | OUTPATIENT
Start: 2024-02-23

## 2024-02-23 NOTE — TELEPHONE ENCOUNTER
Please schedule patient for the following procedure:    Physician - Dr Nieves    Type of Procedure/Injection - Lumbar Medial Branch Block  L4/5 and L5/S1           Laterality - Bilateral      Anxiolysis- RNIV      Need to hold medication - No      N/A    Dulaglutide (Trulicity)7 days and Tirzepatide (Mounjaro) 7 days she reports planning on starting this.  She is unsure which 1.  Please express to her this needs to be stopped 1 week prior to the procedure depending on which medicine she takes.        Clearance needed - No      Follow up - phone call next day

## 2024-02-23 NOTE — H&P (VIEW-ONLY)
[facility] Follow Up Evaluation    Referred by: Dr. Singletary  Reason for referral: back pain    CC:   Chief Complaint   Patient presents with    Low-back Pain          2/23/2024     2:40 PM 11/17/2023     9:59 AM 9/12/2023     9:45 AM   Last 3 PDI Scores   Pain Disability Index (PDI) 41 21 33     Interval HPI 2/23/2024: Safia Ibarra returns to the office for follow up.  She returns for follow-up with continued lower back pain with radiation down right leg, 9/10, constant, worsened with standing and walking and other physical activities.  She denies any new numbness, liliana weakness or any new changes to her bowel or bladder function.  She has been evaluated by Neurosurgery and they do not recommend any surgical interventions at this time.  They are recommending continued formal physical therapy and considering repeat interventional procedures.      Pain intervention history:  - s/p L5-S1 NATASHA on 1/3/22 with 80% relief   - s/p L5-S1 NATASHA on 9/21/22 with 70% relief lasting for over 5 months  - s/p L5/S1 NATASHA on 4/26/23 without relief  - s/p L4/5 NATASHA on 06/05/2023 with initially 80% relief.  - s/p L4/5 NATASHA on 09/05/2023 with greater than 50% relief.    HPI:   Safia Ibarra is a 68 y.o. female who complains of back pain    Onset: 6-7 months  Inciting Event: none  Progression: since onset, pain is gradually worsening  Current Pain Score: 6/10  Typical Range: 2-10/10  Timing: constant  Quality: shooting  Radiation: yes, down the back both legs  Associated numbness or weakness: yes numbness   Exacerbated by: walking  Allievated by: laying down  Is Pain Level Acceptable?: No    Previous Therapies:  PT/OT: yes  HEP: yes  Interventions:   Surgery:  Medications:   - NSAIDS:   - MSK Relaxants:   - TCAs:   - SNRIs:   - Topicals:   - Anticonvulsants:  - Opioids:     History:    Current Outpatient Medications:     ciprofloxacin HCl (CIPRO) 500 MG tablet, Take 500 mg by mouth 2 (two) times daily., Disp: , Rfl:      gabapentin (NEURONTIN) 300 MG capsule, Take 300 mg by mouth 2 (two) times daily., Disp: , Rfl:     levothyroxine (SYNTHROID, LEVOTHROID) 175 MCG tablet, , Disp: , Rfl:     LIDOcaine (LIDODERM) 5 %, Place 1 patch onto the skin once daily., Disp: , Rfl:     losartan (COZAAR) 25 MG tablet, Take 0.5 tablets (12.5 mg total) by mouth every evening., Disp: 45 tablet, Rfl: 2    meloxicam (MOBIC) 15 MG tablet, Take 15 mg by mouth daily as needed for Pain., Disp: , Rfl:     nystatin (MYCOSTATIN) cream, 3 (three) times daily. Apply to affected area, Disp: , Rfl:     ondansetron (ZOFRAN-ODT) 4 MG TbDL, 4 mg every 6 (six) hours as needed., Disp: , Rfl:     pantoprazole (PROTONIX) 40 MG tablet, Take 40 mg by mouth every morning., Disp: , Rfl:     rosuvastatin (CRESTOR) 10 MG tablet, Take 10 mg by mouth nightly., Disp: , Rfl:     traMADoL (ULTRAM) 50 mg tablet, Take 1 tablet (50 mg total) by mouth every 8 (eight) hours as needed for Pain., Disp: 21 tablet, Rfl: 0    amLODIPine (NORVASC) 5 MG tablet, Take 5 mg by mouth once daily., Disp: , Rfl:     Past Medical History:   Diagnosis Date    HTN (hypertension)     Hyperlipidemia     Hypothyroidism     Osteoporosis        Past Surgical History:   Procedure Laterality Date    BREAST BIOPSY Left     core Dr. Hannah benign over 5 yrs ago    EPIDURAL STEROID INJECTION INTO LUMBAR SPINE N/A 1/3/2022    Procedure: Injection-steroid-epidural-lumbar L5/S1;  Surgeon: Yobani Nieves MD;  Location: [facility] OR;  Service: Pain Management;  Laterality: N/A;    EPIDURAL STEROID INJECTION INTO LUMBAR SPINE N/A 9/21/2022    Procedure: Injection-steroid-epidural-lumbar L5/S1;  Surgeon: Yobani Nieves MD;  Location: [facility] OR;  Service: Pain Management;  Laterality: N/A;    EPIDURAL STEROID INJECTION INTO LUMBAR SPINE N/A 4/26/2023    Procedure: Injection-steroid-epidural-lumbar L5/S1;  Surgeon: Yobani Nieves MD;  Location: [facility] OR;  Service: Pain Management;  Laterality: N/A;    EPIDURAL STEROID  INJECTION INTO LUMBAR SPINE N/A 6/5/2023    Procedure: Injection-steroid-epidural-lumbar L4/L5;  Surgeon: Yobani Nieves MD;  Location: [facility] OR;  Service: Pain Management;  Laterality: N/A;    EPIDURAL STEROID INJECTION INTO LUMBAR SPINE N/A 9/5/2023    Procedure: Injection-steroid-epidural-lumbar L4/5;  Surgeon: Yobani Nieves MD;  Location: [facility] OR;  Service: Pain Management;  Laterality: N/A;  L4/5    HYSTERECTOMY         Family History   Problem Relation Age of Onset    Uterine cancer Mother     Breast cancer Paternal Aunt        Social History     Socioeconomic History    Marital status:    Tobacco Use    Smoking status: Never    Smokeless tobacco: Never   Substance and Sexual Activity    Alcohol use: Not Currently    Drug use: Never    Sexual activity: Not Currently       Review of patient's allergies indicates:   Allergen Reactions    Bactrim [sulfamethoxazole-trimethoprim] Itching       Review of Systems:  General ROS: negative for - fever  Psychological ROS: negative for - hostility  Hematological and Lymphatic ROS: negative for - bleeding problems  Endocrine ROS: negative for - unexpected weight changes  Respiratory ROS: no cough, shortness of breath, or wheezing  Cardiovascular ROS: no chest pain or dyspnea on exertion  Gastrointestinal ROS: no abdominal pain, change in bowel habits, or black or bloody stools  Musculoskeletal ROS: negative for - muscular weakness  Neurological ROS: positive for - numbness/tingling  Dermatological ROS: negative for rash    Physical Exam:  Vitals:    02/23/24 1442   BP: 136/65   Pulse: 73   Weight: 128.1 kg (282 lb 6.6 oz)   Height: 5' (1.524 m)   PainSc:   9   PainLoc: Back     Body mass index is 55.15 kg/m².     Gen: NAD  Gait:  Presents in wheelchair.  Can ambulate on own.  Psych:  Mood appropriate for given condition  HEENT: eyes anicteric   GI: Abd soft  CV: RRR  Lungs: breathing unlabored   ROM: limited AROM of the L spine in all planes, full ROM at ankles,  knees and hips    Sensation: intact to light touch in all dermatomes tested from L2-S1 bilaterally  Reflexes:   Palpation: Diffusely tender over lumbar paraspinals   Tone: normal in the b/l knees and hips   Skin: intact  Extremities: No edema in b/l ankles or hands  Provacative tests:  Minimal tenderness to palpation over the lower lumbar spine       Right Left   L2/3 Iliacus Hip flexion  5  5   L3/4 Qudratus Femoris Knee Extension  5  5   L4/5 Tib Anterior Ankle Dorsiflexion   5  5   L5/S1 Extensor Hallicus Longus Great toe extension  5  5                 S1/S2 Gastroc/Soleus Plantar Flexion  5  5       Imaging:  MRI lumbar spine 05/13/2023  FINDINGS:  Alignment: Some rightward convexity of the lumbar spine noted.  There is slight grade 1 anterolisthesis of L4 on L5.     Vertebrae: Multiple small Schmorl's nodes and mild multilevel degenerative endplate changes are present.  Vertebral body heights are otherwise within normal limits.  No evidence of fracture or marrow replacement process.     Discs: There is generalized disc desiccation throughout the lumbar spine with moderate disc height loss at L2-3 and L3-4.  Suspect mild disc height loss at L1-2.     Cord: Normal.  Conus terminates at L2.     Degenerative findings:     T12-L1: Minimal posterior disc bulge. No spinal canal or neuroforaminal stenosis.  L1-L2: Generalized disc bulge, slightly asymmetric to left.  Thickening of the ligamentum flavum.  Mild spinal canal stenosis with severe left and moderate right neuroforaminal narrowing.  L2-L3: Generalized disc bulge, asymmetric to the right.  Thickening of the ligamentum flavum.  Mild-to-moderate spinal canal stenosis with severe right and moderate left neuroforaminal narrowing.  L3-L4: Right paracentral disc extrusion superimposed upon more generalized disc bulge.  Mild bilateral facet arthropathy and ligamentum flavum thickening.  Moderate spinal canal stenosis with severe right and moderate left  neuroforaminal narrowing.  L4-L5: Severe bilateral facet arthropathy.  Broad-based disc bulge/uncovering of the intervertebral disc and thickening of the ligamentum flavum.  Moderate spinal canal stenosis with moderate right and mild-to-moderate left neural foraminal narrowing.  L5-S1: Severe bilateral facet arthropathy.  Generalized disc bulge.  Mild crowding of the right lateral recess, otherwise no spinal canal stenosis.  Moderate left and mild right neuroforaminal narrowing.     Paraspinal muscles & soft tissues: Unremarkable.    Xray lumbar spine 6/20/23  FINDINGS:  Rotatory levoconvex curvature of the lumbar spine.  Osseous structures demonstrate diffuse demineralization.  Mild L5 superior endplate compression deformity with approximately 20% height loss, which appears new in comparison to the prior MRI on 05/13/2023.  Remainder of the vertebral bodies appear to maintain normal height.  Multilevel degenerative endplate change, marginal osteophyte formation and intervertebral disc space narrowing, most pronounced at L1-2, L2-3, and L3-4.  Lower lumbar facet arthropathy.    Xray lumbar spine 8/4/23  FINDINGS:  Stable mild superior endplate compression fracture deformity of L5 compared to 06/20/2023.  Diffuse bony demineralization without evidence of any new or progressive fractures.  Lower lumbar predominant facet arthrosis and trace anterolisthesis of L4 on L5 redemonstrated.  Degenerative changes most notable at the level of the visualized lower thoracic spine and disc height loss throughout the lumbar spine which is moderate at L3-L4 and L2-L3.  Broad levocurvature.    MRI lumbar spine 08/21/2023  FINDINGS:  In the interval since the prior examination there appears to have been the development of compression deformity in the L5 vertebral body with approximately 25-50% vertebral body height loss.  The relative rapid development since the prior favors a posttraumatic process to a neoplastic process.  Please  clinically correlate.  This appears to predominantly involve the superior endplate of the L5 vertebral body.  There does not appear to be cortical disruption to suggest an infectious osteomyelitis process which can sometimes appear similar.     The spinal cord appears to terminate at the L1-L2 level.     Collateral vessels are partially visualized in the left upper abdomen, these collateral vessels can be from multiple etiologies including splenorenal shunting associated with liver disease and or portal hypertension.  Please clinically correlate.     At the T11-T12 level, minimal disc bulge is noted without significant central canal stenosis or neural foraminal stenosis.  A similar appearance is noted on the prior.  At the T12-L1 level, no significant disc bulge, central canal stenosis, or neural foraminal stenosis is noted.  At the L1-L2 level, facet arthropathy and ligamentous hypertrophy is noted.  Broad-based bulging is noted towards the left neural foramen greater than right of 6 mm.  Mild right neural foraminal narrowing is noted.  Moderate left neural foraminal stenosis appears to be present with probable contact of the exiting nerve root on the left likely without significant change since the prior.  Facet arthropathy and ligamentous hypertrophy is noted.  Moderate thecal sac narrowing is noted with the thecal sac narrowed to 9 mm.  Very little fluid surrounds the cord at this level  At the L2-L3 level, facet arthropathy and ligamentous hypertrophy appears to be present.  Disc protrusion into the right lateral recess and neural foramen is noted of 5 mm.  Epidural fat is noted.  Bunching of the nerve roots is noted with very little fluid surrounding the nerve roots and moderate thecal sac stenosis to 8 mm with a similar appearance on the prior.  Broad-based uncovertebral spurring is noted bilaterally with moderate to severe right neural foraminal stenosis and probable contact of the exiting nerve root.  Mild  to moderate left neural foraminal stenosis is noted.  The exiting nerve root probably exit unimpinged.  A similar appearance is suspected from prior  At the L3-L4 level, disc protrusion appears to be present into the right and left lateral recesses extending 10 mm posterior to the disc plane.  Facet arthropathy and ligamentous hypertrophy is noted.  Some epidural fat is noted.  There is bunching of the nerve roots and severe thecal sac narrowing of 5-6 mm.  Moderate to severe right neural foraminal stenosis is noted with moderate left neural foraminal stenosis.  The exiting nerve root particularly on the right may be affected.  Mild progression of central canal narrowing may be present since the prior.  At the L4-L5 level, facet arthropathy and ligamentous hypertrophy appears to be present.  Mild broad-based bulge is noted.  Moderate bilateral neural foraminal stenosis is noted.  Contact of the exiting nerve roots may be present.  Some bunching of the nerve roots is noted with epidural fat.  Moderate to severe thecal sac narrowing is noted the 6 mm.  Moderate bilateral neural foraminal stenosis is noted with probable contact of the exiting nerve roots bilaterally.  The central canal narrowing at this level appears progressed since the prior  At the L5-S1 level, broad-based bulging is noted towards each neural foramen with mild to moderate neural foraminal narrowing similar to on the prior.  Minimal central canal narrowing is noted with thecal sac measuring approximately 10 mm unchanged since prior.     Impression:     1. Interval compression fracture at the L5 level is suspected since the MRI of 05/13/2023.  Please clinically correlate.  Infectious or neoplastic processes would seem much less likely but can appear similar under the proper clinical scenario  2. Central canal stenosis particularly at the L3-L4 level where a large protrusion is present with slight progression suspected since the prior.  Central canal  narrowing also is favored to be progressed at the L4-L5 level since the prior.  3. collateral vessels are noted in the left upper quadrant of the abdomen possibly relating to splenorenal shunting and portal hypertension.  Please clinically correlate for possible liver disease    Labs:  BMP  Lab Results   Component Value Date     10/10/2023    K 4.4 10/10/2023     10/10/2023    CO2 33 (H) 10/10/2023    BUN 20 (H) 10/10/2023    CREATININE 1.04 10/10/2023    CALCIUM 8.9 10/10/2023    ANIONGAP 2 (L) 10/10/2023     Lab Results   Component Value Date    ALT 28 10/10/2023    AST 54 (H) 10/10/2023    ALKPHOS 178 (H) 10/10/2023    BILITOT 1.7 (H) 10/10/2023       Assessment:   Problem List Items Addressed This Visit    None  Visit Diagnoses       Lumbar spondylosis    -  Primary    Spinal stenosis of lumbar region with neurogenic claudication        Lumbar radiculopathy        Chronic bilateral low back pain with bilateral sciatica                68 y.o. year old female presents to the office with back pain, constant, 6/10, radiating down the back of both legs.  + numbness, no weakness.    2/23/2024: Safia Ibarra returns to the office for follow up.  She returns for follow-up with continued lower back pain with radiation down right leg, 9/10, constant, worsened with standing and walking and other physical activities.  She denies any new numbness, liliana weakness or any new changes to her bowel or bladder function.  She has been evaluated by Neurosurgery and they do not recommend any surgical interventions at this time.  They are recommending continued formal physical therapy and considering repeat interventional procedures.    - she presents in wheelchair.  On exam she is full strength in her lower extremities.  - we reviewed her lumbar MRI in office today and is consistent with severe central canal stenosis particularly at the L3-L4 level where a large protrusion is present with slight progression  suspected since the prior.  Central canal narrowing also is favored to be progressed at the L4-L5 level since the prior.  She also has severe facet arthropathy worse at L4/5 and L5/S1.  - she likely continues to have a combination of multiple pain generators including the protrusion at L3/4, the central canal stenosis and likely axial facet mediated pain.  - she is found some relief with lumbar epidurals however not complete.  - at this time her lower back pain is most problematic for her  - to address her axial facet mediated pain into hopefully allow her some relief so she can better participate in formal physical therapy and weight loss I would like to schedule her for bilateral L4/5 and L5/S1 diagnostic medial branch blocks.  If successful we will repeat the blocks prior to proceeding with radiofrequency ablation.  - follow-up 4 weeks post procedure or sooner if needed.  If she fails to get relief with this can consider repeat lumbar epidural.      :  Reviewed    The above note was completed, in part, with the aid of Dragon dictation software/hardware. Translation errors may be present.

## 2024-02-23 NOTE — PROGRESS NOTES
[facility] Follow Up Evaluation    Referred by: Dr. Singletary  Reason for referral: back pain    CC:   Chief Complaint   Patient presents with    Low-back Pain          2/23/2024     2:40 PM 11/17/2023     9:59 AM 9/12/2023     9:45 AM   Last 3 PDI Scores   Pain Disability Index (PDI) 41 21 33     Interval HPI 2/23/2024: Safia Ibarra returns to the office for follow up.  She returns for follow-up with continued lower back pain with radiation down right leg, 9/10, constant, worsened with standing and walking and other physical activities.  She denies any new numbness, liliana weakness or any new changes to her bowel or bladder function.  She has been evaluated by Neurosurgery and they do not recommend any surgical interventions at this time.  They are recommending continued formal physical therapy and considering repeat interventional procedures.      Pain intervention history:  - s/p L5-S1 NATASHA on 1/3/22 with 80% relief   - s/p L5-S1 NATASHA on 9/21/22 with 70% relief lasting for over 5 months  - s/p L5/S1 NATASHA on 4/26/23 without relief  - s/p L4/5 NATASHA on 06/05/2023 with initially 80% relief.  - s/p L4/5 NATASHA on 09/05/2023 with greater than 50% relief.    HPI:   Safia Ibarra is a 68 y.o. female who complains of back pain    Onset: 6-7 months  Inciting Event: none  Progression: since onset, pain is gradually worsening  Current Pain Score: 6/10  Typical Range: 2-10/10  Timing: constant  Quality: shooting  Radiation: yes, down the back both legs  Associated numbness or weakness: yes numbness   Exacerbated by: walking  Allievated by: laying down  Is Pain Level Acceptable?: No    Previous Therapies:  PT/OT: yes  HEP: yes  Interventions:   Surgery:  Medications:   - NSAIDS:   - MSK Relaxants:   - TCAs:   - SNRIs:   - Topicals:   - Anticonvulsants:  - Opioids:     History:    Current Outpatient Medications:     ciprofloxacin HCl (CIPRO) 500 MG tablet, Take 500 mg by mouth 2 (two) times daily., Disp: , Rfl:      gabapentin (NEURONTIN) 300 MG capsule, Take 300 mg by mouth 2 (two) times daily., Disp: , Rfl:     levothyroxine (SYNTHROID, LEVOTHROID) 175 MCG tablet, , Disp: , Rfl:     LIDOcaine (LIDODERM) 5 %, Place 1 patch onto the skin once daily., Disp: , Rfl:     losartan (COZAAR) 25 MG tablet, Take 0.5 tablets (12.5 mg total) by mouth every evening., Disp: 45 tablet, Rfl: 2    meloxicam (MOBIC) 15 MG tablet, Take 15 mg by mouth daily as needed for Pain., Disp: , Rfl:     nystatin (MYCOSTATIN) cream, 3 (three) times daily. Apply to affected area, Disp: , Rfl:     ondansetron (ZOFRAN-ODT) 4 MG TbDL, 4 mg every 6 (six) hours as needed., Disp: , Rfl:     pantoprazole (PROTONIX) 40 MG tablet, Take 40 mg by mouth every morning., Disp: , Rfl:     rosuvastatin (CRESTOR) 10 MG tablet, Take 10 mg by mouth nightly., Disp: , Rfl:     traMADoL (ULTRAM) 50 mg tablet, Take 1 tablet (50 mg total) by mouth every 8 (eight) hours as needed for Pain., Disp: 21 tablet, Rfl: 0    amLODIPine (NORVASC) 5 MG tablet, Take 5 mg by mouth once daily., Disp: , Rfl:     Past Medical History:   Diagnosis Date    HTN (hypertension)     Hyperlipidemia     Hypothyroidism     Osteoporosis        Past Surgical History:   Procedure Laterality Date    BREAST BIOPSY Left     core Dr. Hannah benign over 5 yrs ago    EPIDURAL STEROID INJECTION INTO LUMBAR SPINE N/A 1/3/2022    Procedure: Injection-steroid-epidural-lumbar L5/S1;  Surgeon: Yobani Nieves MD;  Location: [facility] OR;  Service: Pain Management;  Laterality: N/A;    EPIDURAL STEROID INJECTION INTO LUMBAR SPINE N/A 9/21/2022    Procedure: Injection-steroid-epidural-lumbar L5/S1;  Surgeon: Yobani Nieves MD;  Location: [facility] OR;  Service: Pain Management;  Laterality: N/A;    EPIDURAL STEROID INJECTION INTO LUMBAR SPINE N/A 4/26/2023    Procedure: Injection-steroid-epidural-lumbar L5/S1;  Surgeon: Yobani Nieves MD;  Location: [facility] OR;  Service: Pain Management;  Laterality: N/A;    EPIDURAL STEROID  INJECTION INTO LUMBAR SPINE N/A 6/5/2023    Procedure: Injection-steroid-epidural-lumbar L4/L5;  Surgeon: Yobani Nieves MD;  Location: [facility] OR;  Service: Pain Management;  Laterality: N/A;    EPIDURAL STEROID INJECTION INTO LUMBAR SPINE N/A 9/5/2023    Procedure: Injection-steroid-epidural-lumbar L4/5;  Surgeon: Yobani Nieves MD;  Location: [facility] OR;  Service: Pain Management;  Laterality: N/A;  L4/5    HYSTERECTOMY         Family History   Problem Relation Age of Onset    Uterine cancer Mother     Breast cancer Paternal Aunt        Social History     Socioeconomic History    Marital status:    Tobacco Use    Smoking status: Never    Smokeless tobacco: Never   Substance and Sexual Activity    Alcohol use: Not Currently    Drug use: Never    Sexual activity: Not Currently       Review of patient's allergies indicates:   Allergen Reactions    Bactrim [sulfamethoxazole-trimethoprim] Itching       Review of Systems:  General ROS: negative for - fever  Psychological ROS: negative for - hostility  Hematological and Lymphatic ROS: negative for - bleeding problems  Endocrine ROS: negative for - unexpected weight changes  Respiratory ROS: no cough, shortness of breath, or wheezing  Cardiovascular ROS: no chest pain or dyspnea on exertion  Gastrointestinal ROS: no abdominal pain, change in bowel habits, or black or bloody stools  Musculoskeletal ROS: negative for - muscular weakness  Neurological ROS: positive for - numbness/tingling  Dermatological ROS: negative for rash    Physical Exam:  Vitals:    02/23/24 1442   BP: 136/65   Pulse: 73   Weight: 128.1 kg (282 lb 6.6 oz)   Height: 5' (1.524 m)   PainSc:   9   PainLoc: Back     Body mass index is 55.15 kg/m².     Gen: NAD  Gait:  Presents in wheelchair.  Can ambulate on own.  Psych:  Mood appropriate for given condition  HEENT: eyes anicteric   GI: Abd soft  CV: RRR  Lungs: breathing unlabored   ROM: limited AROM of the L spine in all planes, full ROM at ankles,  knees and hips    Sensation: intact to light touch in all dermatomes tested from L2-S1 bilaterally  Reflexes:   Palpation: Diffusely tender over lumbar paraspinals   Tone: normal in the b/l knees and hips   Skin: intact  Extremities: No edema in b/l ankles or hands  Provacative tests:  Minimal tenderness to palpation over the lower lumbar spine       Right Left   L2/3 Iliacus Hip flexion  5  5   L3/4 Qudratus Femoris Knee Extension  5  5   L4/5 Tib Anterior Ankle Dorsiflexion   5  5   L5/S1 Extensor Hallicus Longus Great toe extension  5  5                 S1/S2 Gastroc/Soleus Plantar Flexion  5  5       Imaging:  MRI lumbar spine 05/13/2023  FINDINGS:  Alignment: Some rightward convexity of the lumbar spine noted.  There is slight grade 1 anterolisthesis of L4 on L5.     Vertebrae: Multiple small Schmorl's nodes and mild multilevel degenerative endplate changes are present.  Vertebral body heights are otherwise within normal limits.  No evidence of fracture or marrow replacement process.     Discs: There is generalized disc desiccation throughout the lumbar spine with moderate disc height loss at L2-3 and L3-4.  Suspect mild disc height loss at L1-2.     Cord: Normal.  Conus terminates at L2.     Degenerative findings:     T12-L1: Minimal posterior disc bulge. No spinal canal or neuroforaminal stenosis.  L1-L2: Generalized disc bulge, slightly asymmetric to left.  Thickening of the ligamentum flavum.  Mild spinal canal stenosis with severe left and moderate right neuroforaminal narrowing.  L2-L3: Generalized disc bulge, asymmetric to the right.  Thickening of the ligamentum flavum.  Mild-to-moderate spinal canal stenosis with severe right and moderate left neuroforaminal narrowing.  L3-L4: Right paracentral disc extrusion superimposed upon more generalized disc bulge.  Mild bilateral facet arthropathy and ligamentum flavum thickening.  Moderate spinal canal stenosis with severe right and moderate left  neuroforaminal narrowing.  L4-L5: Severe bilateral facet arthropathy.  Broad-based disc bulge/uncovering of the intervertebral disc and thickening of the ligamentum flavum.  Moderate spinal canal stenosis with moderate right and mild-to-moderate left neural foraminal narrowing.  L5-S1: Severe bilateral facet arthropathy.  Generalized disc bulge.  Mild crowding of the right lateral recess, otherwise no spinal canal stenosis.  Moderate left and mild right neuroforaminal narrowing.     Paraspinal muscles & soft tissues: Unremarkable.    Xray lumbar spine 6/20/23  FINDINGS:  Rotatory levoconvex curvature of the lumbar spine.  Osseous structures demonstrate diffuse demineralization.  Mild L5 superior endplate compression deformity with approximately 20% height loss, which appears new in comparison to the prior MRI on 05/13/2023.  Remainder of the vertebral bodies appear to maintain normal height.  Multilevel degenerative endplate change, marginal osteophyte formation and intervertebral disc space narrowing, most pronounced at L1-2, L2-3, and L3-4.  Lower lumbar facet arthropathy.    Xray lumbar spine 8/4/23  FINDINGS:  Stable mild superior endplate compression fracture deformity of L5 compared to 06/20/2023.  Diffuse bony demineralization without evidence of any new or progressive fractures.  Lower lumbar predominant facet arthrosis and trace anterolisthesis of L4 on L5 redemonstrated.  Degenerative changes most notable at the level of the visualized lower thoracic spine and disc height loss throughout the lumbar spine which is moderate at L3-L4 and L2-L3.  Broad levocurvature.    MRI lumbar spine 08/21/2023  FINDINGS:  In the interval since the prior examination there appears to have been the development of compression deformity in the L5 vertebral body with approximately 25-50% vertebral body height loss.  The relative rapid development since the prior favors a posttraumatic process to a neoplastic process.  Please  clinically correlate.  This appears to predominantly involve the superior endplate of the L5 vertebral body.  There does not appear to be cortical disruption to suggest an infectious osteomyelitis process which can sometimes appear similar.     The spinal cord appears to terminate at the L1-L2 level.     Collateral vessels are partially visualized in the left upper abdomen, these collateral vessels can be from multiple etiologies including splenorenal shunting associated with liver disease and or portal hypertension.  Please clinically correlate.     At the T11-T12 level, minimal disc bulge is noted without significant central canal stenosis or neural foraminal stenosis.  A similar appearance is noted on the prior.  At the T12-L1 level, no significant disc bulge, central canal stenosis, or neural foraminal stenosis is noted.  At the L1-L2 level, facet arthropathy and ligamentous hypertrophy is noted.  Broad-based bulging is noted towards the left neural foramen greater than right of 6 mm.  Mild right neural foraminal narrowing is noted.  Moderate left neural foraminal stenosis appears to be present with probable contact of the exiting nerve root on the left likely without significant change since the prior.  Facet arthropathy and ligamentous hypertrophy is noted.  Moderate thecal sac narrowing is noted with the thecal sac narrowed to 9 mm.  Very little fluid surrounds the cord at this level  At the L2-L3 level, facet arthropathy and ligamentous hypertrophy appears to be present.  Disc protrusion into the right lateral recess and neural foramen is noted of 5 mm.  Epidural fat is noted.  Bunching of the nerve roots is noted with very little fluid surrounding the nerve roots and moderate thecal sac stenosis to 8 mm with a similar appearance on the prior.  Broad-based uncovertebral spurring is noted bilaterally with moderate to severe right neural foraminal stenosis and probable contact of the exiting nerve root.  Mild  to moderate left neural foraminal stenosis is noted.  The exiting nerve root probably exit unimpinged.  A similar appearance is suspected from prior  At the L3-L4 level, disc protrusion appears to be present into the right and left lateral recesses extending 10 mm posterior to the disc plane.  Facet arthropathy and ligamentous hypertrophy is noted.  Some epidural fat is noted.  There is bunching of the nerve roots and severe thecal sac narrowing of 5-6 mm.  Moderate to severe right neural foraminal stenosis is noted with moderate left neural foraminal stenosis.  The exiting nerve root particularly on the right may be affected.  Mild progression of central canal narrowing may be present since the prior.  At the L4-L5 level, facet arthropathy and ligamentous hypertrophy appears to be present.  Mild broad-based bulge is noted.  Moderate bilateral neural foraminal stenosis is noted.  Contact of the exiting nerve roots may be present.  Some bunching of the nerve roots is noted with epidural fat.  Moderate to severe thecal sac narrowing is noted the 6 mm.  Moderate bilateral neural foraminal stenosis is noted with probable contact of the exiting nerve roots bilaterally.  The central canal narrowing at this level appears progressed since the prior  At the L5-S1 level, broad-based bulging is noted towards each neural foramen with mild to moderate neural foraminal narrowing similar to on the prior.  Minimal central canal narrowing is noted with thecal sac measuring approximately 10 mm unchanged since prior.     Impression:     1. Interval compression fracture at the L5 level is suspected since the MRI of 05/13/2023.  Please clinically correlate.  Infectious or neoplastic processes would seem much less likely but can appear similar under the proper clinical scenario  2. Central canal stenosis particularly at the L3-L4 level where a large protrusion is present with slight progression suspected since the prior.  Central canal  narrowing also is favored to be progressed at the L4-L5 level since the prior.  3. collateral vessels are noted in the left upper quadrant of the abdomen possibly relating to splenorenal shunting and portal hypertension.  Please clinically correlate for possible liver disease    Labs:  BMP  Lab Results   Component Value Date     10/10/2023    K 4.4 10/10/2023     10/10/2023    CO2 33 (H) 10/10/2023    BUN 20 (H) 10/10/2023    CREATININE 1.04 10/10/2023    CALCIUM 8.9 10/10/2023    ANIONGAP 2 (L) 10/10/2023     Lab Results   Component Value Date    ALT 28 10/10/2023    AST 54 (H) 10/10/2023    ALKPHOS 178 (H) 10/10/2023    BILITOT 1.7 (H) 10/10/2023       Assessment:   Problem List Items Addressed This Visit    None  Visit Diagnoses       Lumbar spondylosis    -  Primary    Spinal stenosis of lumbar region with neurogenic claudication        Lumbar radiculopathy        Chronic bilateral low back pain with bilateral sciatica                68 y.o. year old female presents to the office with back pain, constant, 6/10, radiating down the back of both legs.  + numbness, no weakness.    2/23/2024: Safia Ibarra returns to the office for follow up.  She returns for follow-up with continued lower back pain with radiation down right leg, 9/10, constant, worsened with standing and walking and other physical activities.  She denies any new numbness, liliana weakness or any new changes to her bowel or bladder function.  She has been evaluated by Neurosurgery and they do not recommend any surgical interventions at this time.  They are recommending continued formal physical therapy and considering repeat interventional procedures.    - she presents in wheelchair.  On exam she is full strength in her lower extremities.  - we reviewed her lumbar MRI in office today and is consistent with severe central canal stenosis particularly at the L3-L4 level where a large protrusion is present with slight progression  suspected since the prior.  Central canal narrowing also is favored to be progressed at the L4-L5 level since the prior.  She also has severe facet arthropathy worse at L4/5 and L5/S1.  - she likely continues to have a combination of multiple pain generators including the protrusion at L3/4, the central canal stenosis and likely axial facet mediated pain.  - she is found some relief with lumbar epidurals however not complete.  - at this time her lower back pain is most problematic for her  - to address her axial facet mediated pain into hopefully allow her some relief so she can better participate in formal physical therapy and weight loss I would like to schedule her for bilateral L4/5 and L5/S1 diagnostic medial branch blocks.  If successful we will repeat the blocks prior to proceeding with radiofrequency ablation.  - follow-up 4 weeks post procedure or sooner if needed.  If she fails to get relief with this can consider repeat lumbar epidural.      :  Reviewed    The above note was completed, in part, with the aid of Dragon dictation software/hardware. Translation errors may be present.

## 2024-02-26 NOTE — TELEPHONE ENCOUNTER
Spoke with patient to schedule. Advised to not start Mounjaro or Trulicity till after block. Patient states that she still has to talk to her PCP before starting and will wait till after the block. Pre op information given to patient.

## 2024-03-05 ENCOUNTER — TELEPHONE (OUTPATIENT)
Dept: PAIN MEDICINE | Facility: HOSPITAL | Age: 69
End: 2024-03-05
Payer: MEDICARE

## 2024-03-05 ENCOUNTER — HOSPITAL ENCOUNTER (OUTPATIENT)
Dept: RADIOLOGY | Facility: HOSPITAL | Age: 69
Discharge: HOME | End: 2024-03-05
Attending: ANESTHESIOLOGY | Admitting: ANESTHESIOLOGY
Payer: MEDICARE

## 2024-03-05 ENCOUNTER — HOSPITAL ENCOUNTER (OUTPATIENT)
Facility: HOSPITAL | Age: 69
Discharge: HOME | End: 2024-03-05
Attending: ANESTHESIOLOGY | Admitting: ANESTHESIOLOGY
Payer: COMMERCIAL

## 2024-03-05 VITALS
WEIGHT: 260 LBS | TEMPERATURE: 98 F | DIASTOLIC BLOOD PRESSURE: 60 MMHG | BODY MASS INDEX: 51.04 KG/M2 | SYSTOLIC BLOOD PRESSURE: 127 MMHG | OXYGEN SATURATION: 99 % | RESPIRATION RATE: 18 BRPM | HEART RATE: 69 BPM | HEIGHT: 60 IN

## 2024-03-05 DIAGNOSIS — M47.816 LUMBAR SPONDYLOSIS: Primary | ICD-10-CM

## 2024-03-05 DIAGNOSIS — M54.50 LOWER BACK PAIN: ICD-10-CM

## 2024-03-05 DIAGNOSIS — M54.16 LUMBAR RADICULOPATHY: ICD-10-CM

## 2024-03-05 PROCEDURE — 64493 INJ PARAVERT F JNT L/S 1 LEV: CPT | Mod: 50,,, | Performed by: ANESTHESIOLOGY

## 2024-03-05 PROCEDURE — 64493 INJ PARAVERT F JNT L/S 1 LEV: CPT | Mod: 50,PO | Performed by: ANESTHESIOLOGY

## 2024-03-05 PROCEDURE — 76000 FLUOROSCOPY <1 HR PHYS/QHP: CPT | Mod: TC,PO

## 2024-03-05 PROCEDURE — 64494 INJ PARAVERT F JNT L/S 2 LEV: CPT | Mod: 50,,, | Performed by: ANESTHESIOLOGY

## 2024-03-05 PROCEDURE — 63600175 PHARM REV CODE 636 W HCPCS: Mod: PO | Performed by: ANESTHESIOLOGY

## 2024-03-05 PROCEDURE — 25000003 PHARM REV CODE 250: Mod: PO | Performed by: ANESTHESIOLOGY

## 2024-03-05 PROCEDURE — 99152 MOD SED SAME PHYS/QHP 5/>YRS: CPT | Mod: ,,, | Performed by: ANESTHESIOLOGY

## 2024-03-05 PROCEDURE — 64494 INJ PARAVERT F JNT L/S 2 LEV: CPT | Mod: 50,PO | Performed by: ANESTHESIOLOGY

## 2024-03-05 RX ORDER — LIDOCAINE HYDROCHLORIDE 10 MG/ML
1 INJECTION, SOLUTION EPIDURAL; INFILTRATION; INTRACAUDAL; PERINEURAL ONCE
Status: COMPLETED | OUTPATIENT
Start: 2024-03-05 | End: 2024-03-05

## 2024-03-05 RX ORDER — MIDAZOLAM HYDROCHLORIDE 2 MG/2ML
INJECTION, SOLUTION INTRAMUSCULAR; INTRAVENOUS
Status: DISCONTINUED | OUTPATIENT
Start: 2024-03-05 | End: 2024-03-05 | Stop reason: HOSPADM

## 2024-03-05 RX ORDER — TRAMADOL HYDROCHLORIDE 50 MG/1
50 TABLET ORAL EVERY 12 HOURS PRN
Qty: 40 TABLET | Refills: 0 | Status: SHIPPED | OUTPATIENT
Start: 2024-03-05 | End: 2024-04-17 | Stop reason: SDUPTHER

## 2024-03-05 RX ORDER — SODIUM CHLORIDE, SODIUM LACTATE, POTASSIUM CHLORIDE, CALCIUM CHLORIDE 600; 310; 30; 20 MG/100ML; MG/100ML; MG/100ML; MG/100ML
INJECTION, SOLUTION INTRAVENOUS CONTINUOUS
Status: DISCONTINUED | OUTPATIENT
Start: 2024-03-05 | End: 2024-03-05 | Stop reason: HOSPADM

## 2024-03-05 RX ADMIN — SODIUM CHLORIDE, POTASSIUM CHLORIDE, SODIUM LACTATE AND CALCIUM CHLORIDE: 600; 310; 30; 20 INJECTION, SOLUTION INTRAVENOUS at 01:03

## 2024-03-05 RX ADMIN — LIDOCAINE HYDROCHLORIDE 10 MG: 10 INJECTION, SOLUTION EPIDURAL; INFILTRATION; INTRACAUDAL; PERINEURAL at 01:03

## 2024-03-05 NOTE — TELEPHONE ENCOUNTER
Please schedule this patient a follow up with me in the office next week.  Please double book her on my schedule.

## 2024-03-05 NOTE — OP NOTE
Procedure Note    Procedure Date: 3/5/2024    Procedure Performed:  bilateral Diagnostic Lumbar Medial Branch @ L4/5 and L5/S1, with Fluoroscopic Guidance    Indications:   Safia Ibarra has chronic, moderate to severe axial lower back pain present for over the past 3 months that has failed to respond to physical therapy and PT-directed home exercises. There is no untreated radiculopathy or claudication present.  The patient has clinical and radiologic findings suggestive of facet mediated pain.     Pre-op diagnosis: Lumbar Spondylosis    Post-op diagnosis: same    Physician: Yobani Nieves MD    IV Anxiolysis medications: versed 2mg    Medications injected: Bupivacaine 0.25%, 0.5 cc per level    Local anesthetic used: 1% Lidocaine, 4 ml    Estimated Blood Loss: none    Complications:  none    Technique:  The patient was interviewed in the holding area and Risks/Benefits were discussed, including, but not limited to, the possibility of new or different pain, bleeding or infection.   All questions were answered.  The patient understood and accepted risks.  Consent was reviewed.  A time out was taken to identify the patient, procedure and side of the procedure. The patient was placed in a prone position, then prepped and draped in the usual sterile fashion using ChloraPrep x2 and sterile towels.  The levels were determined under fluoroscopic guidance and then marked.  1% Lidocaine was given by raising a wheal at the skin over each site and then infiltrated approximately 2cm deeper.  A 25-gauge 3.5 inch needle was introduced to the anatomic location of the L3-5 medial branch nerves on the bilateral side.  Then, after negative aspiration, 0.5 cc of Bupivacaine 0.25% was injected @ each level.  The patient tolerated the procedure well.  The patient tolerated the procedure well and was transferred to the [facility] in stable condition.  The patient was monitored after the procedure.  Patient was given post procedure  and discharge instructions to follow at home.  The patient was discharged in a stable condition.    Event Time In   In Facility 1226   In Pre-Procedure 1320   Physician Available    Anesthesia Available    Pre-Op: Bedside Procedure Start    Pre-Op: Bedside Procedure Stop    Pre-Procedure Complete 1351   Out of Pre-Procedure    Anesthesia Start    Anesthesia Start Data Collection    Setup Start    Setup Complete    In Room 1448   Prep Start    Procedure Prep Complete    MD notified pt. ready    Procedure Start 1452   Procedure Closing    Emergence    Procedure Finish 1459   Sedation Start 1447   Scope In    Extent Reached    Scope Out    Sedation End 1459   Out of Room 1501   Cleanup Start    Cleanup Complete    Cosmetic Start    Cosmetic Stop    Pain Mgmt In Room    Pain Mgmt Out Room    In Recovery    Anesthesia Finish    Bedside Procedure Start    Bedside Procedure Stop    Recovery Care Complete    Out of Recovery    To Phase II    In Phase II    Pain Mgmt Recovery Start    Pain Mgmt Recovery Stop    Obs Rec Start    Obs Rec Stop    Phase II Care Complete    Out of Phase II    Procedural Care Complete    Discharge    Pain Follow Up Needed    Pain Follow Up Complete

## 2024-03-05 NOTE — INTERVAL H&P NOTE
The patient has been examined and the H&P has been reviewed:    I concur with the findings and no changes have occurred since H&P was written.  The risks and benefits of this intervention, and alternative therapies were discussed with the patient.  The discussion of risks included infection, bleeding, need for additional procedures or surgery, nerve damage.  Questions regarding the procedure, risks, expected outcome, and possible side effects were solicited and answered to the patient's satisfaction.  Safia Keen wishes to proceed with the injection or procedure.  Written consent was obtained.  ASA 2, MP II      There are no hospital problems to display for this patient.

## 2024-03-05 NOTE — DISCHARGE SUMMARY
[facility]  Discharge Note  Short Stay    Admit Date: 3/5/2024    Discharge Date: 3/5/2024    Attending Physician: Yobani Nieves     Discharge Provider: Yobani Nieves    Diagnoses:  There are no hospital problems to display for this patient.      Discharged Condition: Good    Final Diagnoses: Lumbar spondylosis [M47.816]    Disposition: Home or Self Care    Hospital Course: No complications, uneventful    Outcome of Hospitalization, Treatment, Procedure, or Surgery:  Patient was admitted for outpatient interventional pain management procedure. The patient tolerated the procedure well with no complications.    Follow up/Patient Instructions:  Follow up as scheduled in Pain Management office in 2-3 weeks.  Patient has received instructions and follow up date and time.    Medications:  Continue previous medications    Discharge Procedure Orders   Notify your health care provider if you experience any of the following:  temperature >100.4     Notify your health care provider if you experience any of the following:  persistent nausea and vomiting or diarrhea     Notify your health care provider if you experience any of the following:  severe uncontrolled pain     Notify your health care provider if you experience any of the following:  redness, tenderness, or signs of infection (pain, swelling, redness, odor or green/yellow discharge around incision site)     Notify your health care provider if you experience any of the following:  difficulty breathing or increased cough     Notify your health care provider if you experience any of the following:  severe persistent headache     Notify your health care provider if you experience any of the following:  worsening rash     Notify your health care provider if you experience any of the following:  persistent dizziness, light-headedness, or visual disturbances     Notify your health care provider if you experience any of the following:  increased confusion or  weakness     Activity as tolerated

## 2024-03-07 ENCOUNTER — TELEPHONE (OUTPATIENT)
Dept: PAIN MEDICINE | Facility: CLINIC | Age: 69
End: 2024-03-07
Payer: MEDICARE

## 2024-03-07 NOTE — TELEPHONE ENCOUNTER
----- Message from Shannon Major sent at 3/7/2024 12:38 PM CST -----  Regarding: advise  Contact: patient  Type: Needs Medical Advice  Who Called:  patient  Symptoms (please be specific):  follow up for procedure  How long has patient had these symptoms:    Pharmacy name and phone #:    Best Call Back Number: [phone]    Additional Information: call to be seen thanks

## 2024-03-12 ENCOUNTER — OFFICE VISIT (OUTPATIENT)
Dept: PAIN MEDICINE | Facility: CLINIC | Age: 69
End: 2024-03-12
Payer: COMMERCIAL

## 2024-03-12 ENCOUNTER — TELEPHONE (OUTPATIENT)
Dept: PAIN MEDICINE | Facility: CLINIC | Age: 69
End: 2024-03-12

## 2024-03-12 ENCOUNTER — LAB VISIT (OUTPATIENT)
Dept: LAB | Facility: HOSPITAL | Age: 69
End: 2024-03-12
Attending: ANESTHESIOLOGY
Payer: COMMERCIAL

## 2024-03-12 VITALS
BODY MASS INDEX: 51.03 KG/M2 | SYSTOLIC BLOOD PRESSURE: 138 MMHG | WEIGHT: 259.94 LBS | HEIGHT: 60 IN | HEART RATE: 72 BPM | DIASTOLIC BLOOD PRESSURE: 75 MMHG

## 2024-03-12 DIAGNOSIS — Z01.818 PRE-OP TESTING: ICD-10-CM

## 2024-03-12 DIAGNOSIS — M54.16 LUMBAR RADICULOPATHY: Primary | ICD-10-CM

## 2024-03-12 DIAGNOSIS — M47.816 LUMBAR SPONDYLOSIS: ICD-10-CM

## 2024-03-12 LAB
ERYTHROCYTE [DISTWIDTH] IN BLOOD BY AUTOMATED COUNT: 18.1 % (ref 11.5–14.5)
HCT VFR BLD AUTO: 34.4 % (ref 37–48.5)
HGB BLD-MCNC: 10.8 G/DL (ref 12–16)
INR PPP: 1.3 (ref 0.8–1.2)
MCH RBC QN AUTO: 28.1 PG (ref 27–31)
MCHC RBC AUTO-ENTMCNC: 31.4 G/DL (ref 32–36)
MCV RBC AUTO: 89 FL (ref 82–98)
PLATELET # BLD AUTO: 88 K/UL (ref 150–450)
PMV BLD AUTO: 10.4 FL (ref 9.2–12.9)
PROTHROMBIN TIME: 13.8 SEC (ref 9–12.5)
RBC # BLD AUTO: 3.85 M/UL (ref 4–5.4)
WBC # BLD AUTO: 4.64 K/UL (ref 3.9–12.7)

## 2024-03-12 PROCEDURE — 1125F AMNT PAIN NOTED PAIN PRSNT: CPT | Mod: CPTII,S$GLB,, | Performed by: ANESTHESIOLOGY

## 2024-03-12 PROCEDURE — 4010F ACE/ARB THERAPY RXD/TAKEN: CPT | Mod: CPTII,S$GLB,, | Performed by: ANESTHESIOLOGY

## 2024-03-12 PROCEDURE — 99999 PR PBB SHADOW E&M-EST. PATIENT-LVL III: CPT | Mod: PBBFAC,,, | Performed by: ANESTHESIOLOGY

## 2024-03-12 PROCEDURE — 1159F MED LIST DOCD IN RCRD: CPT | Mod: CPTII,S$GLB,, | Performed by: ANESTHESIOLOGY

## 2024-03-12 PROCEDURE — 85027 COMPLETE CBC AUTOMATED: CPT | Performed by: ANESTHESIOLOGY

## 2024-03-12 PROCEDURE — 3008F BODY MASS INDEX DOCD: CPT | Mod: CPTII,S$GLB,, | Performed by: ANESTHESIOLOGY

## 2024-03-12 PROCEDURE — 99214 OFFICE O/P EST MOD 30 MIN: CPT | Mod: S$GLB,,, | Performed by: ANESTHESIOLOGY

## 2024-03-12 PROCEDURE — 3078F DIAST BP <80 MM HG: CPT | Mod: CPTII,S$GLB,, | Performed by: ANESTHESIOLOGY

## 2024-03-12 PROCEDURE — 85610 PROTHROMBIN TIME: CPT | Mod: PO | Performed by: ANESTHESIOLOGY

## 2024-03-12 PROCEDURE — 1100F PTFALLS ASSESS-DOCD GE2>/YR: CPT | Mod: CPTII,S$GLB,, | Performed by: ANESTHESIOLOGY

## 2024-03-12 PROCEDURE — 3288F FALL RISK ASSESSMENT DOCD: CPT | Mod: CPTII,S$GLB,, | Performed by: ANESTHESIOLOGY

## 2024-03-12 PROCEDURE — 36415 COLL VENOUS BLD VENIPUNCTURE: CPT | Mod: PO | Performed by: ANESTHESIOLOGY

## 2024-03-12 PROCEDURE — 3075F SYST BP GE 130 - 139MM HG: CPT | Mod: CPTII,S$GLB,, | Performed by: ANESTHESIOLOGY

## 2024-03-12 RX ORDER — ALPRAZOLAM 1 MG/1
1 TABLET, ORALLY DISINTEGRATING ORAL ONCE AS NEEDED
Status: CANCELLED | OUTPATIENT
Start: 2024-03-12 | End: 2035-08-09

## 2024-03-12 NOTE — TELEPHONE ENCOUNTER
Physician - Dr Nieves    Type of Procedure/Injection - Lumbar Epidural  L5/S1           Laterality - NA      Anxiolysis- Local      Need to hold medication - No      Clearance needed - No      Follow up - 3 week

## 2024-03-12 NOTE — H&P (VIEW-ONLY)
[facility] Follow Up Evaluation    Referred by: Dr. Singletary  Reason for referral: back pain    CC:   Chief Complaint   Patient presents with    Low-back Pain     Right side       Leg Pain     Right side/ tingling/ numbness          3/12/2024    10:56 AM 2/23/2024     2:40 PM 11/17/2023     9:59 AM   Last 3 PDI Scores   Pain Disability Index (PDI) 22 41 21     Interval HPI 3/12/2024: Safia Ibarra returns to the office for follow up.  Today she reports she continues to have pain in her lower back that goes down her right leg into her right shin.  She rates her pain as 6/10, constant, sharp, stabbing.  She has not having any new numbness or weakness.    Pain intervention history:  - s/p L5-S1 NATASHA on 1/3/22 with 80% relief   - s/p L5-S1 NATASHA on 9/21/22 with 70% relief lasting for over 5 months  - s/p L5/S1 NATASHA on 4/26/23 without relief  - s/p L4/5 NATASHA on 06/05/2023 with initially 80% relief.  - s/p L4/5 NATASHA on 09/05/2023 with greater than 50% relief  - s/p 1st diagnostic bilateral L4/5 and L5/S1 diagnostic medial branch blocks on 3/5/24    HPI:   Safia Ibarra is a 68 y.o. female who complains of back pain    Onset: 6-7 months  Inciting Event: none  Progression: since onset, pain is gradually worsening  Current Pain Score: 6/10  Typical Range: 2-10/10  Timing: constant  Quality: shooting  Radiation: yes, down the back both legs  Associated numbness or weakness: yes numbness   Exacerbated by: walking  Allievated by: laying down  Is Pain Level Acceptable?: No    Previous Therapies:  PT/OT: yes  HEP: yes  Interventions:   Surgery:  Medications:   - NSAIDS:   - MSK Relaxants:   - TCAs:   - SNRIs:   - Topicals:   - Anticonvulsants:  - Opioids:     History:    Current Outpatient Medications:     ciprofloxacin HCl (CIPRO) 500 MG tablet, Take 500 mg by mouth 2 (two) times daily., Disp: , Rfl:     gabapentin (NEURONTIN) 300 MG capsule, Take 300 mg by mouth 2 (two) times daily., Disp: , Rfl:      levothyroxine (SYNTHROID, LEVOTHROID) 175 MCG tablet, , Disp: , Rfl:     LIDOcaine (LIDODERM) 5 %, Place 1 patch onto the skin once daily., Disp: , Rfl:     losartan (COZAAR) 25 MG tablet, Take 0.5 tablets (12.5 mg total) by mouth every evening., Disp: 45 tablet, Rfl: 2    meloxicam (MOBIC) 15 MG tablet, Take 15 mg by mouth daily as needed for Pain., Disp: , Rfl:     nystatin (MYCOSTATIN) cream, 3 (three) times daily. Apply to affected area, Disp: , Rfl:     ondansetron (ZOFRAN-ODT) 4 MG TbDL, 4 mg every 6 (six) hours as needed., Disp: , Rfl:     pantoprazole (PROTONIX) 40 MG tablet, Take 40 mg by mouth every morning., Disp: , Rfl:     rosuvastatin (CRESTOR) 10 MG tablet, Take 10 mg by mouth nightly., Disp: , Rfl:     traMADoL (ULTRAM) 50 mg tablet, Take 1 tablet (50 mg total) by mouth every 12 (twelve) hours as needed for Pain., Disp: 40 tablet, Rfl: 0    amLODIPine (NORVASC) 5 MG tablet, Take 5 mg by mouth once daily., Disp: , Rfl:     Past Medical History:   Diagnosis Date    HTN (hypertension)     Hyperlipidemia     Hypothyroidism     Osteoporosis        Past Surgical History:   Procedure Laterality Date    BREAST BIOPSY Left     core Dr. Hannah benign over 5 yrs ago    EPIDURAL STEROID INJECTION INTO LUMBAR SPINE N/A 1/3/2022    Procedure: Injection-steroid-epidural-lumbar L5/S1;  Surgeon: Yobani Nieves MD;  Location: [facility] OR;  Service: Pain Management;  Laterality: N/A;    EPIDURAL STEROID INJECTION INTO LUMBAR SPINE N/A 9/21/2022    Procedure: Injection-steroid-epidural-lumbar L5/S1;  Surgeon: Yoabni Nieves MD;  Location: [facility] OR;  Service: Pain Management;  Laterality: N/A;    EPIDURAL STEROID INJECTION INTO LUMBAR SPINE N/A 4/26/2023    Procedure: Injection-steroid-epidural-lumbar L5/S1;  Surgeon: Yobani Nieves MD;  Location: [facility] OR;  Service: Pain Management;  Laterality: N/A;    EPIDURAL STEROID INJECTION INTO LUMBAR SPINE N/A 6/5/2023    Procedure: Injection-steroid-epidural-lumbar L4/L5;   Surgeon: Yobani Nieves MD;  Location: [facility] OR;  Service: Pain Management;  Laterality: N/A;    EPIDURAL STEROID INJECTION INTO LUMBAR SPINE N/A 9/5/2023    Procedure: Injection-steroid-epidural-lumbar L4/5;  Surgeon: Yobani Nieves MD;  Location: [facility] OR;  Service: Pain Management;  Laterality: N/A;  L4/5    HYSTERECTOMY      INJECTION OF ANESTHETIC AGENT AROUND MEDIAL BRANCH NERVES INNERVATING LUMBAR FACET JOINT Bilateral 3/5/2024    Procedure: Block-nerve-medial branch-lumbar    L4/5, L5/S1;  Surgeon: Yobani Nieves MD;  Location: [facility] OR;  Service: Pain Management;  Laterality: Bilateral;       Family History   Problem Relation Age of Onset    Uterine cancer Mother     Breast cancer Paternal Aunt        Social History     Socioeconomic History    Marital status:    Tobacco Use    Smoking status: Never    Smokeless tobacco: Never   Substance and Sexual Activity    Alcohol use: Never    Drug use: Never    Sexual activity: Not Currently       Review of patient's allergies indicates:   Allergen Reactions    Bactrim [sulfamethoxazole-trimethoprim] Itching       Review of Systems:  General ROS: negative for - fever  Psychological ROS: negative for - hostility  Hematological and Lymphatic ROS: negative for - bleeding problems  Endocrine ROS: negative for - unexpected weight changes  Respiratory ROS: no cough, shortness of breath, or wheezing  Cardiovascular ROS: no chest pain or dyspnea on exertion  Gastrointestinal ROS: no abdominal pain, change in bowel habits, or black or bloody stools  Musculoskeletal ROS: negative for - muscular weakness  Neurological ROS: positive for - numbness/tingling  Dermatological ROS: negative for rash    Physical Exam:  Vitals:    03/12/24 1053   BP: 138/75   Pulse: 72   Weight: 117.9 kg (259 lb 14.8 oz)   Height: 5' (1.524 m)   PainSc:   6   PainLoc: Back       Body mass index is 50.76 kg/m².     Gen: NAD  Gait:  Presents in wheelchair.  Can ambulate on own.  Psych:  Mood  appropriate for given condition  HEENT: eyes anicteric   GI: Abd soft  CV: RRR  Lungs: breathing unlabored   ROM: limited AROM of the L spine in all planes, full ROM at ankles, knees and hips    Sensation: intact to light touch in all dermatomes tested from L2-S1 bilaterally  Reflexes:   Palpation: No tenderness to palpation over the midline lumbar spine  Tone: normal in the b/l knees and hips   Skin: intact  Extremities: No edema in b/l ankles or hands  Provacative tests:         Right Left   L2/3 Iliacus Hip flexion  5  5   L3/4 Qudratus Femoris Knee Extension  5  5   L4/5 Tib Anterior Ankle Dorsiflexion   5  5   L5/S1 Extensor Hallicus Longus Great toe extension  5  5                 S1/S2 Gastroc/Soleus Plantar Flexion  5  5       Imaging:  Xray lumbar spine 8/4/23  FINDINGS:  Stable mild superior endplate compression fracture deformity of L5 compared to 06/20/2023.  Diffuse bony demineralization without evidence of any new or progressive fractures.  Lower lumbar predominant facet arthrosis and trace anterolisthesis of L4 on L5 redemonstrated.  Degenerative changes most notable at the level of the visualized lower thoracic spine and disc height loss throughout the lumbar spine which is moderate at L3-L4 and L2-L3.  Broad levocurvature.    MRI thoracic spine 12/18/23  Impression:  1. Mild broad dextrocurvature of the thoracic spine.  Sagittal alignment is maintained noting mild exaggerated thoracic kyphosis.  2. Mild degenerative changes as discussed above without evidence of any significant spinal canal or foraminal narrowing.    MRI cervical spine 12/18/23  Impression:  1. Motion degraded exam limits evaluation.  No acute process.  2. Up to mild multifactorial spinal canal narrowing.  No cord impingement.  3. Uncovertebral spurring and facet arthrosis produce foraminal narrowing.  Limited evaluation suggests at least moderate to severe right and moderate left C3-C4, severe left/moderate right C4-C5, and  moderate bilateral C5-C6 narrowing.    MRI lumbar spine 08/21/2023  FINDINGS:  In the interval since the prior examination there appears to have been the development of compression deformity in the L5 vertebral body with approximately 25-50% vertebral body height loss.  The relative rapid development since the prior favors a posttraumatic process to a neoplastic process.  Please clinically correlate.  This appears to predominantly involve the superior endplate of the L5 vertebral body.  There does not appear to be cortical disruption to suggest an infectious osteomyelitis process which can sometimes appear similar.     The spinal cord appears to terminate at the L1-L2 level.     Collateral vessels are partially visualized in the left upper abdomen, these collateral vessels can be from multiple etiologies including splenorenal shunting associated with liver disease and or portal hypertension.  Please clinically correlate.     At the T11-T12 level, minimal disc bulge is noted without significant central canal stenosis or neural foraminal stenosis.  A similar appearance is noted on the prior.  At the T12-L1 level, no significant disc bulge, central canal stenosis, or neural foraminal stenosis is noted.  At the L1-L2 level, facet arthropathy and ligamentous hypertrophy is noted.  Broad-based bulging is noted towards the left neural foramen greater than right of 6 mm.  Mild right neural foraminal narrowing is noted.  Moderate left neural foraminal stenosis appears to be present with probable contact of the exiting nerve root on the left likely without significant change since the prior.  Facet arthropathy and ligamentous hypertrophy is noted.  Moderate thecal sac narrowing is noted with the thecal sac narrowed to 9 mm.  Very little fluid surrounds the cord at this level  At the L2-L3 level, facet arthropathy and ligamentous hypertrophy appears to be present.  Disc protrusion into the right lateral recess and neural  foramen is noted of 5 mm.  Epidural fat is noted.  Bunching of the nerve roots is noted with very little fluid surrounding the nerve roots and moderate thecal sac stenosis to 8 mm with a similar appearance on the prior.  Broad-based uncovertebral spurring is noted bilaterally with moderate to severe right neural foraminal stenosis and probable contact of the exiting nerve root.  Mild to moderate left neural foraminal stenosis is noted.  The exiting nerve root probably exit unimpinged.  A similar appearance is suspected from prior  At the L3-L4 level, disc protrusion appears to be present into the right and left lateral recesses extending 10 mm posterior to the disc plane.  Facet arthropathy and ligamentous hypertrophy is noted.  Some epidural fat is noted.  There is bunching of the nerve roots and severe thecal sac narrowing of 5-6 mm.  Moderate to severe right neural foraminal stenosis is noted with moderate left neural foraminal stenosis.  The exiting nerve root particularly on the right may be affected.  Mild progression of central canal narrowing may be present since the prior.  At the L4-L5 level, facet arthropathy and ligamentous hypertrophy appears to be present.  Mild broad-based bulge is noted.  Moderate bilateral neural foraminal stenosis is noted.  Contact of the exiting nerve roots may be present.  Some bunching of the nerve roots is noted with epidural fat.  Moderate to severe thecal sac narrowing is noted the 6 mm.  Moderate bilateral neural foraminal stenosis is noted with probable contact of the exiting nerve roots bilaterally.  The central canal narrowing at this level appears progressed since the prior  At the L5-S1 level, broad-based bulging is noted towards each neural foramen with mild to moderate neural foraminal narrowing similar to on the prior.  Minimal central canal narrowing is noted with thecal sac measuring approximately 10 mm unchanged since prior.       Labs:  BMP  Lab Results    Component Value Date     10/10/2023    K 4.4 10/10/2023     10/10/2023    CO2 33 (H) 10/10/2023    BUN 20 (H) 10/10/2023    CREATININE 1.04 10/10/2023    CALCIUM 8.9 10/10/2023    ANIONGAP 2 (L) 10/10/2023     Lab Results   Component Value Date    ALT 28 10/10/2023    AST 54 (H) 10/10/2023    ALKPHOS 178 (H) 10/10/2023    BILITOT 1.7 (H) 10/10/2023       Assessment:   Problem List Items Addressed This Visit    None  Visit Diagnoses       Pre-op testing    -  Primary    Relevant Orders    CBC Without Differential    Protime-INR    Lumbar radiculopathy        Lumbar spondylosis                  68 y.o. year old female presents to the office with back pain, constant, 6/10, radiating down the back of both legs.  + numbness, no weakness.        3/12/24 - Safia Ibarra returns to the office for follow up.  Today she reports she continues to have pain in her lower back that goes down her right leg into her right shin.  She rates her pain as 6/10, constant, sharp, stabbing.  She has not having any new numbness or weakness.    - on exam she has in a wheelchair.  She uses this when it is available to her but otherwise can ambulate on her own.  She has full strength in his lower extremities and intact sensation to light touch bilateral L2-S1  - I independently reviewed her lumbar MRI with her today.  She has a right paracentral disc protrusion at L3-4 with severe central canal narrowing at L3-4.  At L4-5 she has severe central canal narrowing.  She has multilevel bilateral facet arthropathy  - over the past 6 weeks she is currently been participating in physical therapy and PT directed home exercises without significant improvement in her symptoms  - she has seen Neurosurgery and reports that she is not a surgical candidate at this time.  She is working on lifestyle modifications including weight loss  - her pain is limiting her mobility and interfering with the quality of her life  - think she has lower  back pain secondary to lumbar spondylosis however she has significant narrowing in her lumbar spine that I think is causing lumbar radicular pain into her right  - I am going to schedule for L5-S1 NATASHA.  The risks and benefits of this intervention, and alternative therapies were discussed with the patient.  The discussion of risks included infection, bleeding, need for additional procedures or surgery, nerve damage.  Questions regarding the procedure, risks, expected outcome, and possible side effects were solicited and answered to the patient's satisfaction.  Safia Keen wishes to proceed with the injection or procedure.  Written consent was obtained.  - of note she reports some easy bruising.  Platelets in October or 100 K.  I have ordered updated CBC and INR.  - follow up 2-3 weeks post injection    Addendum:    She is status post 1st diagnostic bilateral L4-5 and L5-S1 medial branch blocks with over 80% relief lasting for 8 hours.  Pre procedure pain score was 8, post procedure pain score was 2.  We we were going to try a lumbar NATASHA however the patient is thrombocytopenic on her most recent labs that I ordered and at lumbar epidural is not indicated at this time.  We are going to try and maximize conservative treatment for her axial lower back pain.  We are going to schedule for 2nd diagnostic bilateral L4-5 and L5-S1 medial branch blocks.      :  Reviewed    The above note was completed, in part, with the aid of Dragon dictation software/hardware. Translation errors may be present.

## 2024-03-12 NOTE — PROGRESS NOTES
[facility] Follow Up Evaluation    Referred by: Dr. Singletary  Reason for referral: back pain    CC:   Chief Complaint   Patient presents with    Low-back Pain     Right side       Leg Pain     Right side/ tingling/ numbness          3/12/2024    10:56 AM 2/23/2024     2:40 PM 11/17/2023     9:59 AM   Last 3 PDI Scores   Pain Disability Index (PDI) 22 41 21     Interval HPI 3/12/2024: Safia Ibarra returns to the office for follow up.  Today she reports she continues to have pain in her lower back that goes down her right leg into her right shin.  She rates her pain as 6/10, constant, sharp, stabbing.  She has not having any new numbness or weakness.    Pain intervention history:  - s/p L5-S1 NATASHA on 1/3/22 with 80% relief   - s/p L5-S1 NATASHA on 9/21/22 with 70% relief lasting for over 5 months  - s/p L5/S1 NATASHA on 4/26/23 without relief  - s/p L4/5 NATASHA on 06/05/2023 with initially 80% relief.  - s/p L4/5 NATASHA on 09/05/2023 with greater than 50% relief  - s/p 1st diagnostic bilateral L4/5 and L5/S1 diagnostic medial branch blocks on 3/5/24    HPI:   Safia Ibarra is a 68 y.o. female who complains of back pain    Onset: 6-7 months  Inciting Event: none  Progression: since onset, pain is gradually worsening  Current Pain Score: 6/10  Typical Range: 2-10/10  Timing: constant  Quality: shooting  Radiation: yes, down the back both legs  Associated numbness or weakness: yes numbness   Exacerbated by: walking  Allievated by: laying down  Is Pain Level Acceptable?: No    Previous Therapies:  PT/OT: yes  HEP: yes  Interventions:   Surgery:  Medications:   - NSAIDS:   - MSK Relaxants:   - TCAs:   - SNRIs:   - Topicals:   - Anticonvulsants:  - Opioids:     History:    Current Outpatient Medications:     ciprofloxacin HCl (CIPRO) 500 MG tablet, Take 500 mg by mouth 2 (two) times daily., Disp: , Rfl:     gabapentin (NEURONTIN) 300 MG capsule, Take 300 mg by mouth 2 (two) times daily., Disp: , Rfl:      levothyroxine (SYNTHROID, LEVOTHROID) 175 MCG tablet, , Disp: , Rfl:     LIDOcaine (LIDODERM) 5 %, Place 1 patch onto the skin once daily., Disp: , Rfl:     losartan (COZAAR) 25 MG tablet, Take 0.5 tablets (12.5 mg total) by mouth every evening., Disp: 45 tablet, Rfl: 2    meloxicam (MOBIC) 15 MG tablet, Take 15 mg by mouth daily as needed for Pain., Disp: , Rfl:     nystatin (MYCOSTATIN) cream, 3 (three) times daily. Apply to affected area, Disp: , Rfl:     ondansetron (ZOFRAN-ODT) 4 MG TbDL, 4 mg every 6 (six) hours as needed., Disp: , Rfl:     pantoprazole (PROTONIX) 40 MG tablet, Take 40 mg by mouth every morning., Disp: , Rfl:     rosuvastatin (CRESTOR) 10 MG tablet, Take 10 mg by mouth nightly., Disp: , Rfl:     traMADoL (ULTRAM) 50 mg tablet, Take 1 tablet (50 mg total) by mouth every 12 (twelve) hours as needed for Pain., Disp: 40 tablet, Rfl: 0    amLODIPine (NORVASC) 5 MG tablet, Take 5 mg by mouth once daily., Disp: , Rfl:     Past Medical History:   Diagnosis Date    HTN (hypertension)     Hyperlipidemia     Hypothyroidism     Osteoporosis        Past Surgical History:   Procedure Laterality Date    BREAST BIOPSY Left     core Dr. Hannah benign over 5 yrs ago    EPIDURAL STEROID INJECTION INTO LUMBAR SPINE N/A 1/3/2022    Procedure: Injection-steroid-epidural-lumbar L5/S1;  Surgeon: Yobani Nieves MD;  Location: [facility] OR;  Service: Pain Management;  Laterality: N/A;    EPIDURAL STEROID INJECTION INTO LUMBAR SPINE N/A 9/21/2022    Procedure: Injection-steroid-epidural-lumbar L5/S1;  Surgeon: Yobani Nieves MD;  Location: [facility] OR;  Service: Pain Management;  Laterality: N/A;    EPIDURAL STEROID INJECTION INTO LUMBAR SPINE N/A 4/26/2023    Procedure: Injection-steroid-epidural-lumbar L5/S1;  Surgeon: Yobani Nieves MD;  Location: [facility] OR;  Service: Pain Management;  Laterality: N/A;    EPIDURAL STEROID INJECTION INTO LUMBAR SPINE N/A 6/5/2023    Procedure: Injection-steroid-epidural-lumbar L4/L5;   Surgeon: Yobani Nieves MD;  Location: [facility] OR;  Service: Pain Management;  Laterality: N/A;    EPIDURAL STEROID INJECTION INTO LUMBAR SPINE N/A 9/5/2023    Procedure: Injection-steroid-epidural-lumbar L4/5;  Surgeon: Yobani Nieves MD;  Location: [facility] OR;  Service: Pain Management;  Laterality: N/A;  L4/5    HYSTERECTOMY      INJECTION OF ANESTHETIC AGENT AROUND MEDIAL BRANCH NERVES INNERVATING LUMBAR FACET JOINT Bilateral 3/5/2024    Procedure: Block-nerve-medial branch-lumbar    L4/5, L5/S1;  Surgeon: Yobani Nieves MD;  Location: [facility] OR;  Service: Pain Management;  Laterality: Bilateral;       Family History   Problem Relation Age of Onset    Uterine cancer Mother     Breast cancer Paternal Aunt        Social History     Socioeconomic History    Marital status:    Tobacco Use    Smoking status: Never    Smokeless tobacco: Never   Substance and Sexual Activity    Alcohol use: Never    Drug use: Never    Sexual activity: Not Currently       Review of patient's allergies indicates:   Allergen Reactions    Bactrim [sulfamethoxazole-trimethoprim] Itching       Review of Systems:  General ROS: negative for - fever  Psychological ROS: negative for - hostility  Hematological and Lymphatic ROS: negative for - bleeding problems  Endocrine ROS: negative for - unexpected weight changes  Respiratory ROS: no cough, shortness of breath, or wheezing  Cardiovascular ROS: no chest pain or dyspnea on exertion  Gastrointestinal ROS: no abdominal pain, change in bowel habits, or black or bloody stools  Musculoskeletal ROS: negative for - muscular weakness  Neurological ROS: positive for - numbness/tingling  Dermatological ROS: negative for rash    Physical Exam:  Vitals:    03/12/24 1053   BP: 138/75   Pulse: 72   Weight: 117.9 kg (259 lb 14.8 oz)   Height: 5' (1.524 m)   PainSc:   6   PainLoc: Back       Body mass index is 50.76 kg/m².     Gen: NAD  Gait:  Presents in wheelchair.  Can ambulate on own.  Psych:  Mood  appropriate for given condition  HEENT: eyes anicteric   GI: Abd soft  CV: RRR  Lungs: breathing unlabored   ROM: limited AROM of the L spine in all planes, full ROM at ankles, knees and hips    Sensation: intact to light touch in all dermatomes tested from L2-S1 bilaterally  Reflexes:   Palpation: No tenderness to palpation over the midline lumbar spine  Tone: normal in the b/l knees and hips   Skin: intact  Extremities: No edema in b/l ankles or hands  Provacative tests:         Right Left   L2/3 Iliacus Hip flexion  5  5   L3/4 Qudratus Femoris Knee Extension  5  5   L4/5 Tib Anterior Ankle Dorsiflexion   5  5   L5/S1 Extensor Hallicus Longus Great toe extension  5  5                 S1/S2 Gastroc/Soleus Plantar Flexion  5  5       Imaging:  Xray lumbar spine 8/4/23  FINDINGS:  Stable mild superior endplate compression fracture deformity of L5 compared to 06/20/2023.  Diffuse bony demineralization without evidence of any new or progressive fractures.  Lower lumbar predominant facet arthrosis and trace anterolisthesis of L4 on L5 redemonstrated.  Degenerative changes most notable at the level of the visualized lower thoracic spine and disc height loss throughout the lumbar spine which is moderate at L3-L4 and L2-L3.  Broad levocurvature.    MRI thoracic spine 12/18/23  Impression:  1. Mild broad dextrocurvature of the thoracic spine.  Sagittal alignment is maintained noting mild exaggerated thoracic kyphosis.  2. Mild degenerative changes as discussed above without evidence of any significant spinal canal or foraminal narrowing.    MRI cervical spine 12/18/23  Impression:  1. Motion degraded exam limits evaluation.  No acute process.  2. Up to mild multifactorial spinal canal narrowing.  No cord impingement.  3. Uncovertebral spurring and facet arthrosis produce foraminal narrowing.  Limited evaluation suggests at least moderate to severe right and moderate left C3-C4, severe left/moderate right C4-C5, and  moderate bilateral C5-C6 narrowing.    MRI lumbar spine 08/21/2023  FINDINGS:  In the interval since the prior examination there appears to have been the development of compression deformity in the L5 vertebral body with approximately 25-50% vertebral body height loss.  The relative rapid development since the prior favors a posttraumatic process to a neoplastic process.  Please clinically correlate.  This appears to predominantly involve the superior endplate of the L5 vertebral body.  There does not appear to be cortical disruption to suggest an infectious osteomyelitis process which can sometimes appear similar.     The spinal cord appears to terminate at the L1-L2 level.     Collateral vessels are partially visualized in the left upper abdomen, these collateral vessels can be from multiple etiologies including splenorenal shunting associated with liver disease and or portal hypertension.  Please clinically correlate.     At the T11-T12 level, minimal disc bulge is noted without significant central canal stenosis or neural foraminal stenosis.  A similar appearance is noted on the prior.  At the T12-L1 level, no significant disc bulge, central canal stenosis, or neural foraminal stenosis is noted.  At the L1-L2 level, facet arthropathy and ligamentous hypertrophy is noted.  Broad-based bulging is noted towards the left neural foramen greater than right of 6 mm.  Mild right neural foraminal narrowing is noted.  Moderate left neural foraminal stenosis appears to be present with probable contact of the exiting nerve root on the left likely without significant change since the prior.  Facet arthropathy and ligamentous hypertrophy is noted.  Moderate thecal sac narrowing is noted with the thecal sac narrowed to 9 mm.  Very little fluid surrounds the cord at this level  At the L2-L3 level, facet arthropathy and ligamentous hypertrophy appears to be present.  Disc protrusion into the right lateral recess and neural  foramen is noted of 5 mm.  Epidural fat is noted.  Bunching of the nerve roots is noted with very little fluid surrounding the nerve roots and moderate thecal sac stenosis to 8 mm with a similar appearance on the prior.  Broad-based uncovertebral spurring is noted bilaterally with moderate to severe right neural foraminal stenosis and probable contact of the exiting nerve root.  Mild to moderate left neural foraminal stenosis is noted.  The exiting nerve root probably exit unimpinged.  A similar appearance is suspected from prior  At the L3-L4 level, disc protrusion appears to be present into the right and left lateral recesses extending 10 mm posterior to the disc plane.  Facet arthropathy and ligamentous hypertrophy is noted.  Some epidural fat is noted.  There is bunching of the nerve roots and severe thecal sac narrowing of 5-6 mm.  Moderate to severe right neural foraminal stenosis is noted with moderate left neural foraminal stenosis.  The exiting nerve root particularly on the right may be affected.  Mild progression of central canal narrowing may be present since the prior.  At the L4-L5 level, facet arthropathy and ligamentous hypertrophy appears to be present.  Mild broad-based bulge is noted.  Moderate bilateral neural foraminal stenosis is noted.  Contact of the exiting nerve roots may be present.  Some bunching of the nerve roots is noted with epidural fat.  Moderate to severe thecal sac narrowing is noted the 6 mm.  Moderate bilateral neural foraminal stenosis is noted with probable contact of the exiting nerve roots bilaterally.  The central canal narrowing at this level appears progressed since the prior  At the L5-S1 level, broad-based bulging is noted towards each neural foramen with mild to moderate neural foraminal narrowing similar to on the prior.  Minimal central canal narrowing is noted with thecal sac measuring approximately 10 mm unchanged since prior.       Labs:  BMP  Lab Results    Component Value Date     10/10/2023    K 4.4 10/10/2023     10/10/2023    CO2 33 (H) 10/10/2023    BUN 20 (H) 10/10/2023    CREATININE 1.04 10/10/2023    CALCIUM 8.9 10/10/2023    ANIONGAP 2 (L) 10/10/2023     Lab Results   Component Value Date    ALT 28 10/10/2023    AST 54 (H) 10/10/2023    ALKPHOS 178 (H) 10/10/2023    BILITOT 1.7 (H) 10/10/2023       Assessment:   Problem List Items Addressed This Visit    None  Visit Diagnoses       Pre-op testing    -  Primary    Relevant Orders    CBC Without Differential    Protime-INR    Lumbar radiculopathy        Lumbar spondylosis                  68 y.o. year old female presents to the office with back pain, constant, 6/10, radiating down the back of both legs.  + numbness, no weakness.        3/12/24 - Safia Ibarra returns to the office for follow up.  Today she reports she continues to have pain in her lower back that goes down her right leg into her right shin.  She rates her pain as 6/10, constant, sharp, stabbing.  She has not having any new numbness or weakness.    - on exam she has in a wheelchair.  She uses this when it is available to her but otherwise can ambulate on her own.  She has full strength in his lower extremities and intact sensation to light touch bilateral L2-S1  - I independently reviewed her lumbar MRI with her today.  She has a right paracentral disc protrusion at L3-4 with severe central canal narrowing at L3-4.  At L4-5 she has severe central canal narrowing.  She has multilevel bilateral facet arthropathy  - over the past 6 weeks she is currently been participating in physical therapy and PT directed home exercises without significant improvement in her symptoms  - she has seen Neurosurgery and reports that she is not a surgical candidate at this time.  She is working on lifestyle modifications including weight loss  - her pain is limiting her mobility and interfering with the quality of her life  - think she has lower  back pain secondary to lumbar spondylosis however she has significant narrowing in her lumbar spine that I think is causing lumbar radicular pain into her right  - I am going to schedule for L5-S1 NATASHA.  The risks and benefits of this intervention, and alternative therapies were discussed with the patient.  The discussion of risks included infection, bleeding, need for additional procedures or surgery, nerve damage.  Questions regarding the procedure, risks, expected outcome, and possible side effects were solicited and answered to the patient's satisfaction.  Safia Keen wishes to proceed with the injection or procedure.  Written consent was obtained.  - of note she reports some easy bruising.  Platelets in October or 100 K.  I have ordered updated CBC and INR.  - follow up 2-3 weeks post injection    Addendum:    She is status post 1st diagnostic bilateral L4-5 and L5-S1 medial branch blocks with over 80% relief lasting for 8 hours.  Pre procedure pain score was 8, post procedure pain score was 2.  We we were going to try a lumbar NATASHA however the patient is thrombocytopenic on her most recent labs that I ordered and at lumbar epidural is not indicated at this time.  We are going to try and maximize conservative treatment for her axial lower back pain.  We are going to schedule for 2nd diagnostic bilateral L4-5 and L5-S1 medial branch blocks.      :  Reviewed    The above note was completed, in part, with the aid of Dragon dictation software/hardware. Translation errors may be present.

## 2024-03-18 ENCOUNTER — TELEPHONE (OUTPATIENT)
Dept: HEPATOLOGY | Facility: CLINIC | Age: 69
End: 2024-03-18
Payer: MEDICARE

## 2024-03-18 NOTE — TELEPHONE ENCOUNTER
Pt was called. LVM asking her to call back to get schld also pt informed that she can /will be placed on waiting list for an sooner appt.    Kiarra

## 2024-03-20 ENCOUNTER — TELEPHONE (OUTPATIENT)
Dept: PAIN MEDICINE | Facility: CLINIC | Age: 69
End: 2024-03-20
Payer: MEDICARE

## 2024-03-20 DIAGNOSIS — M47.816 LUMBAR SPONDYLOSIS: Primary | ICD-10-CM

## 2024-03-20 DIAGNOSIS — M47.816 SPONDYLOSIS OF LUMBAR JOINT: ICD-10-CM

## 2024-03-20 RX ORDER — SODIUM CHLORIDE, SODIUM LACTATE, POTASSIUM CHLORIDE, CALCIUM CHLORIDE 600; 310; 30; 20 MG/100ML; MG/100ML; MG/100ML; MG/100ML
INJECTION, SOLUTION INTRAVENOUS CONTINUOUS
Status: CANCELLED | OUTPATIENT
Start: 2024-03-20

## 2024-03-20 NOTE — TELEPHONE ENCOUNTER
Please let the patient know that her blood results came back and I will not be able to perform the epidural injection that we spoke of.    I would be happy to try the 2nd test injection for lower back pain that is similar to the injection we just did for her.  It is short lasting injection but if she does well with it then we can do an ablation on her lower back.    Below is the order if she would like to change      Physician - Dr Nieves    Type of Procedure/Injection - Lumbar Medial Branch Block  L4/5 and L5/S1           Laterality - Bilateral      Anxiolysis- RNIV      Need to hold medication - No      Dulaglutide (Trulicity)7 days and Tirzepatide (Mounjaro) 7 days      Clearance needed - No      Follow up - phone call next day

## 2024-03-20 NOTE — TELEPHONE ENCOUNTER
----- Message from Edna Lancaster MA sent at 3/19/2024 10:07 AM CDT -----  Regarding: APPOINTMENT  Good morning,    Can you please assist  this pt with an appointment? Pt wants to discuss blood work results ?      Thank you   Edna Brown

## 2024-03-22 ENCOUNTER — TELEPHONE (OUTPATIENT)
Dept: ENDOCRINOLOGY | Facility: CLINIC | Age: 69
End: 2024-03-22
Payer: MEDICARE

## 2024-03-22 ENCOUNTER — TELEPHONE (OUTPATIENT)
Dept: HEPATOLOGY | Facility: CLINIC | Age: 69
End: 2024-03-22
Payer: MEDICARE

## 2024-03-22 NOTE — TELEPHONE ENCOUNTER
Pt was Rs with Beka first at first available.  Kiarra  ----- Message from Martine Hernandez NP sent at 3/22/2024  3:39 PM CDT -----  Regarding: RE: appointment  Contact: patient  Unlikely that we have anything sooner in [place]. Beka's schedule is now open, we can schedule next available with her- either virtual or in-person. Can also try a virtual with another provider. She can always stay on the wait list to see me in [place] if there is a cancellation. Thanks.    ----- Message -----  From: Kiarra Boykin MA  Sent: 3/22/2024   2:53 PM CDT  To: Martine Hernandez NP  Subject: FW: appointment                                  Pt stated she was told by Dr Nieves she needed to be see earlier than June due to abnormal lab results. Please advise.  Kiarra    ----- Message -----  From: Verena Marie  Sent: 3/22/2024  11:13 AM CDT  To: [facility] Staff  Subject: appointment                                      Type:  Sooner Appointment Request    Caller is requesting a sooner appointment.  Caller declined first available appointment listed below.  Caller will not accept being placed on the waitlist and is requesting a message be sent to doctor.    Name of Caller:  Patient  When is the first available appointment?  06/27/24  Symptoms:  abnormal test results  Would the patient rather a call back or a response via [facility]? call  Best Call Back Number:  [phone] (home)     Additional Information:  Please call patient to advise.  Thanks!

## 2024-03-22 NOTE — TELEPHONE ENCOUNTER
----- Message from Verena Marie sent at 3/22/2024 11:14 AM CDT -----  Regarding: appointment  Contact: patient  Type:  Sooner Appointment Request    Caller is requesting a sooner appointment.  Caller declined first available appointment listed below.  Caller will not accept being placed on the waitlist and is requesting a message be sent to doctor.    Name of Caller:  patient  When is the first available appointment?    Symptoms:  see referral  Would the patient rather a call back or a response via [facility]? call  Best Call Back Number:  [phone] (home)     Additional Information:  please call patient to schedule.  Thanks!

## 2024-03-26 ENCOUNTER — TELEPHONE (OUTPATIENT)
Dept: PAIN MEDICINE | Facility: CLINIC | Age: 69
End: 2024-03-26
Payer: MEDICARE

## 2024-03-26 NOTE — TELEPHONE ENCOUNTER
Spoke with patient and informed that lumbar MBB has not been approved with ins and her case will need to be rescheduled. Procedure moved to 4/10. Patient verbalized understanding.

## 2024-04-04 RX ORDER — FESOTERODINE FUMARATE 4 MG/1
4 TABLET, FILM COATED, EXTENDED RELEASE ORAL DAILY
COMMUNITY

## 2024-04-04 RX ORDER — ACETAMINOPHEN 500 MG
500 TABLET ORAL EVERY 6 HOURS PRN
COMMUNITY

## 2024-04-10 ENCOUNTER — HOSPITAL ENCOUNTER (OUTPATIENT)
Dept: RADIOLOGY | Facility: HOSPITAL | Age: 69
Discharge: HOME | End: 2024-04-10
Attending: ANESTHESIOLOGY | Admitting: ANESTHESIOLOGY
Payer: MEDICARE

## 2024-04-10 ENCOUNTER — HOSPITAL ENCOUNTER (OUTPATIENT)
Facility: HOSPITAL | Age: 69
Discharge: HOME | End: 2024-04-10
Attending: ANESTHESIOLOGY | Admitting: ANESTHESIOLOGY
Payer: COMMERCIAL

## 2024-04-10 ENCOUNTER — TELEPHONE (OUTPATIENT)
Dept: PAIN MEDICINE | Facility: CLINIC | Age: 69
End: 2024-04-10
Payer: MEDICARE

## 2024-04-10 VITALS
HEART RATE: 68 BPM | SYSTOLIC BLOOD PRESSURE: 147 MMHG | HEIGHT: 60 IN | TEMPERATURE: 98 F | DIASTOLIC BLOOD PRESSURE: 85 MMHG | BODY MASS INDEX: 50.85 KG/M2 | OXYGEN SATURATION: 100 % | RESPIRATION RATE: 18 BRPM | WEIGHT: 259 LBS

## 2024-04-10 DIAGNOSIS — M47.816 SPONDYLOSIS OF LUMBAR JOINT: ICD-10-CM

## 2024-04-10 DIAGNOSIS — M47.816 LUMBAR SPONDYLOSIS: Primary | ICD-10-CM

## 2024-04-10 DIAGNOSIS — M54.50 LOWER BACK PAIN: ICD-10-CM

## 2024-04-10 PROCEDURE — 64494 INJ PARAVERT F JNT L/S 2 LEV: CPT | Mod: 50,,, | Performed by: ANESTHESIOLOGY

## 2024-04-10 PROCEDURE — 64494 INJ PARAVERT F JNT L/S 2 LEV: CPT | Mod: 50,PO | Performed by: ANESTHESIOLOGY

## 2024-04-10 PROCEDURE — 25000003 PHARM REV CODE 250: Mod: PO | Performed by: ANESTHESIOLOGY

## 2024-04-10 PROCEDURE — 99152 MOD SED SAME PHYS/QHP 5/>YRS: CPT | Mod: ,,, | Performed by: ANESTHESIOLOGY

## 2024-04-10 PROCEDURE — 64493 INJ PARAVERT F JNT L/S 1 LEV: CPT | Mod: 50,PO | Performed by: ANESTHESIOLOGY

## 2024-04-10 PROCEDURE — 64493 INJ PARAVERT F JNT L/S 1 LEV: CPT | Mod: 50,,, | Performed by: ANESTHESIOLOGY

## 2024-04-10 PROCEDURE — 76000 FLUOROSCOPY <1 HR PHYS/QHP: CPT | Mod: TC,PO

## 2024-04-10 PROCEDURE — 63600175 PHARM REV CODE 636 W HCPCS: Mod: PO | Performed by: ANESTHESIOLOGY

## 2024-04-10 RX ORDER — MIDAZOLAM HYDROCHLORIDE 1 MG/ML
INJECTION INTRAMUSCULAR; INTRAVENOUS
Status: DISCONTINUED | OUTPATIENT
Start: 2024-04-10 | End: 2024-04-10 | Stop reason: HOSPADM

## 2024-04-10 RX ORDER — BUPIVACAINE HYDROCHLORIDE 2.5 MG/ML
INJECTION, SOLUTION EPIDURAL; INFILTRATION; INTRACAUDAL
Status: DISCONTINUED | OUTPATIENT
Start: 2024-04-10 | End: 2024-04-10 | Stop reason: HOSPADM

## 2024-04-10 RX ORDER — SODIUM CHLORIDE, SODIUM LACTATE, POTASSIUM CHLORIDE, CALCIUM CHLORIDE 600; 310; 30; 20 MG/100ML; MG/100ML; MG/100ML; MG/100ML
INJECTION, SOLUTION INTRAVENOUS CONTINUOUS
Status: DISCONTINUED | OUTPATIENT
Start: 2024-04-10 | End: 2024-04-10 | Stop reason: HOSPADM

## 2024-04-10 RX ORDER — LIDOCAINE HYDROCHLORIDE 10 MG/ML
INJECTION, SOLUTION EPIDURAL; INFILTRATION; INTRACAUDAL; PERINEURAL
Status: DISCONTINUED | OUTPATIENT
Start: 2024-04-10 | End: 2024-04-10 | Stop reason: HOSPADM

## 2024-04-10 RX ADMIN — SODIUM CHLORIDE, POTASSIUM CHLORIDE, SODIUM LACTATE AND CALCIUM CHLORIDE: 600; 310; 30; 20 INJECTION, SOLUTION INTRAVENOUS at 01:04

## 2024-04-10 NOTE — OP NOTE
Procedure Note    Procedure Date: 4/10/2024    Procedure Performed:  bilateral Diagnostic Lumbar Medial Branch @ L4/5 and L5/S1, with Fluoroscopic Guidance    Indications: The patient has clinical and radiologic findings suggestive of facet mediated pain and is s/p 1st diagnostic bilateral lumbar L4/5 and L5/S1 medial branch blocks with at least 80% relief of their axial back pain lasting the duration of the local anesthetic utilized.     Pre-op diagnosis: Lumbar Spondylosis    Post-op diagnosis: same    Physician: Yobani Nieves MD    IV Anxiolysis medications: versed 2mg    Medications injected: Bupivacaine 0.25%, 0.5 cc per level    Local anesthetic used: 1% Lidocaine, 4 ml    Estimated Blood Loss: none    Complications:  none    Technique:  The patient was interviewed in the holding area and Risks/Benefits were discussed, including, but not limited to, the possibility of new or different pain, bleeding or infection.   All questions were answered.  The patient understood and accepted risks.  Consent was reviewed.  A time out was taken to identify the patient, procedure and side of the procedure. The patient was placed in a prone position, then prepped and draped in the usual sterile fashion using ChloraPrep x2 and sterile towels.  The levels were determined under fluoroscopic guidance and then marked.  1% Lidocaine was given by raising a wheal at the skin over each site and then infiltrated approximately 2cm deeper.  A 25-gauge 3.5 inch needle was introduced to the anatomic location of the L3-5 medial branch nerves on the bilateral side.  Then, after negative aspiration, 0.5 cc of Bupivacaine 0.25% was injected @ each level.  The patient tolerated the procedure well.  The patient tolerated the procedure well and was transferred to the [facility] in stable condition.  The patient was monitored after the procedure.  Patient was given post procedure and discharge instructions to follow at home.  The patient was  discharged in a stable condition.    Event Time In   In Facility 1201   In Pre-Procedure 1314   Physician Available    Anesthesia Available    Pre-Op: Bedside Procedure Start    Pre-Op: Bedside Procedure Stop    Pre-Procedure Complete 1332   Out of Pre-Procedure    Anesthesia Start    Anesthesia Start Data Collection    Setup Start    Setup Complete    In Room 1350   Prep Start    Procedure Prep Complete    MD notified pt. ready    Procedure Start 1357   Procedure Closing    Emergence    Procedure Finish 1401   Sedation Start 1349   Scope In    Extent Reached    Scope Out    Sedation End 1402   Out of Room 1403   Cleanup Start    Cleanup Complete    Cosmetic Start    Cosmetic Stop    Pain Mgmt In Room    Pain Mgmt Out Room    In Recovery    Anesthesia Finish    Bedside Procedure Start    Bedside Procedure Stop    Recovery Care Complete    Out of Recovery    To Phase II    In Phase II    Pain Mgmt Recovery Start 1403   Pain Mgmt Recovery Stop    Obs Rec Start    Obs Rec Stop    Phase II Care Complete    Out of Phase II    Procedural Care Complete    Discharge    Pain Follow Up Needed    Pain Follow Up Complete

## 2024-04-10 NOTE — INTERVAL H&P NOTE
The patient has been examined and the H&P has been reviewed:    I concur with the findings and no changes have occurred since H&P was written.  Cbc reviewed. We will proceed with 2nd diagnostic bilateral L4/5 and L5/S1 medial branch blocks. The risks and benefits of this intervention, and alternative therapies were discussed with the patient.  The discussion of risks included infection, bleeding, need for additional procedures or surgery, nerve damage.  Questions regarding the procedure, risks, expected outcome, and possible side effects were solicited and answered to the patient's satisfaction.  Safia Keen wishes to proceed with the injection or procedure.  Written consent was obtained.  ASA 3, MP II      There are no hospital problems to display for this patient.

## 2024-04-10 NOTE — DISCHARGE INSTRUCTIONS
PAIN MANAGEMENT    HOME CARE INSTRUCTIONS   Do not use heat (such as a heating pad) for 24 hours.  You may apply an ice pack to the injection site for 20 minutes at a time for the first 24 hours for soreness/discomfort at injection site   Keep site clean and dry for 24 hours. If bandaid is present, remove when desired.   Do not drive until tomorrow.  Take care when walking after a lumbar injection.   Resume home medication as prescribed today.  Resume Aspirin, Plavix, or Coumadin the day after the procedure unless other wise instructed.      BLOCKS  Resume regular activities today.  Pain office will call in next 2 days.      CALL PHYSICIAN FOR:  Severe increase in your usual pain or the appearance of new pain.  Prolonged or increasing weakness or numbness in the legs or arms.  Fever greater than 100 degrees F.  Drainage, redness, active bleeding, or increased swelling at the injection site.  Headache that increases when your head is upright and decreases when you lie flat.    FOR EMERGENCIES:   Go directly to the emergency department for any shortness of breath, chest pain, or problems breathing.     negative...

## 2024-04-10 NOTE — DISCHARGE SUMMARY
[facility]  Discharge Note  Short Stay    Admit Date: 4/10/2024    Discharge Date: 4/10/2024    Attending Physician: Yobani Nieves     Discharge Provider: Yobani Nieves    Diagnoses:  There are no hospital problems to display for this patient.      Discharged Condition: Good    Final Diagnoses: Lumbar spondylosis [M47.816]    Disposition: Home or Self Care    Hospital Course: No complications, uneventful    Outcome of Hospitalization, Treatment, Procedure, or Surgery:  Patient was admitted for outpatient interventional pain management procedure. The patient tolerated the procedure well with no complications.    Follow up/Patient Instructions:  Follow up as scheduled in Pain Management office in 2-3 weeks.  Patient has received instructions and follow up date and time.    Medications:  Continue previous medications, except restart aspirin in 24 hours    Discharge Procedure Orders   Notify your health care provider if you experience any of the following:  temperature >100.4     Notify your health care provider if you experience any of the following:  persistent nausea and vomiting or diarrhea     Notify your health care provider if you experience any of the following:  severe uncontrolled pain     Notify your health care provider if you experience any of the following:  redness, tenderness, or signs of infection (pain, swelling, redness, odor or green/yellow discharge around incision site)     Notify your health care provider if you experience any of the following:  difficulty breathing or increased cough     Notify your health care provider if you experience any of the following:  severe persistent headache     Notify your health care provider if you experience any of the following:  worsening rash     Notify your health care provider if you experience any of the following:  persistent dizziness, light-headedness, or visual disturbances     Notify your health care provider if you experience any of the  following:  increased confusion or weakness     Activity as tolerated

## 2024-04-12 ENCOUNTER — TELEPHONE (OUTPATIENT)
Dept: PAIN MEDICINE | Facility: CLINIC | Age: 69
End: 2024-04-12
Payer: MEDICARE

## 2024-04-16 ENCOUNTER — TELEPHONE (OUTPATIENT)
Dept: HEPATOLOGY | Facility: CLINIC | Age: 69
End: 2024-04-16
Payer: MEDICARE

## 2024-04-16 NOTE — TELEPHONE ENCOUNTER
Reached out to pt to discuss appt needing to be scheduled w/ an MD. Informed pt that she was scheduled incorrectly. Pt vu and appt w/ MD was scheduled as a VV. All questions answered

## 2024-04-16 NOTE — TELEPHONE ENCOUNTER
From: Safia Ibarra   Sent: 4/15/2024   4:29 PM CDT   To: [facility] Clinical Staff   Subject: Appointment Request                                Appointment Request From: Safia Ibarra      With Provider: Martine Hernandez [[place] - Hepatology]      Preferred Date Range: 4/18/2024 - 4/22/2024      Preferred Times: Monday Afternoon, Thursday Afternoon,   Friday Afternoon      Reason for visit: I need to come to [place] and not   [place]  doctor ordered for me to come cause my   bloodwork showed liver was  to low to get shot so I   need appointment as soon as possible. [phone]      Comments:   My liver       Spoke with patient and together schld an appt with provider Krystyna in [place] as requested by patient.    Kiarra

## 2024-04-16 NOTE — TELEPHONE ENCOUNTER
New patient  Pt booked with NP but did not show. Needs MD for evaluation of cirrhosis, anasarca on outside imaging. Please contact pt to schedule with any MD as a new patient  Please cancel any future ARA visits (luis johnson in June and August)    Thx!

## 2024-04-17 ENCOUNTER — OFFICE VISIT (OUTPATIENT)
Dept: PAIN MEDICINE | Facility: CLINIC | Age: 69
End: 2024-04-17
Payer: COMMERCIAL

## 2024-04-17 ENCOUNTER — TELEPHONE (OUTPATIENT)
Dept: PAIN MEDICINE | Facility: CLINIC | Age: 69
End: 2024-04-17

## 2024-04-17 VITALS
BODY MASS INDEX: 50.96 KG/M2 | WEIGHT: 269.94 LBS | HEART RATE: 78 BPM | SYSTOLIC BLOOD PRESSURE: 134 MMHG | DIASTOLIC BLOOD PRESSURE: 89 MMHG | HEIGHT: 61 IN

## 2024-04-17 DIAGNOSIS — M54.16 LUMBAR RADICULOPATHY: ICD-10-CM

## 2024-04-17 DIAGNOSIS — D69.6 THROMBOCYTOPENIA: ICD-10-CM

## 2024-04-17 DIAGNOSIS — G89.29 CHRONIC BILATERAL LOW BACK PAIN WITH BILATERAL SCIATICA: ICD-10-CM

## 2024-04-17 DIAGNOSIS — M54.42 CHRONIC BILATERAL LOW BACK PAIN WITH BILATERAL SCIATICA: ICD-10-CM

## 2024-04-17 DIAGNOSIS — M47.816 LUMBAR SPONDYLOSIS: Primary | ICD-10-CM

## 2024-04-17 DIAGNOSIS — M54.41 CHRONIC BILATERAL LOW BACK PAIN WITH BILATERAL SCIATICA: ICD-10-CM

## 2024-04-17 DIAGNOSIS — M48.062 SPINAL STENOSIS OF LUMBAR REGION WITH NEUROGENIC CLAUDICATION: ICD-10-CM

## 2024-04-17 PROCEDURE — 4010F ACE/ARB THERAPY RXD/TAKEN: CPT | Mod: CPTII,S$GLB,,

## 2024-04-17 PROCEDURE — 3075F SYST BP GE 130 - 139MM HG: CPT | Mod: CPTII,S$GLB,,

## 2024-04-17 PROCEDURE — 3008F BODY MASS INDEX DOCD: CPT | Mod: CPTII,S$GLB,,

## 2024-04-17 PROCEDURE — 1159F MED LIST DOCD IN RCRD: CPT | Mod: CPTII,S$GLB,,

## 2024-04-17 PROCEDURE — 1101F PT FALLS ASSESS-DOCD LE1/YR: CPT | Mod: CPTII,S$GLB,,

## 2024-04-17 PROCEDURE — 3288F FALL RISK ASSESSMENT DOCD: CPT | Mod: CPTII,S$GLB,,

## 2024-04-17 PROCEDURE — 99999 PR PBB SHADOW E&M-EST. PATIENT-LVL III: CPT | Mod: PBBFAC,,,

## 2024-04-17 PROCEDURE — 1125F AMNT PAIN NOTED PAIN PRSNT: CPT | Mod: CPTII,S$GLB,,

## 2024-04-17 PROCEDURE — 99214 OFFICE O/P EST MOD 30 MIN: CPT | Mod: S$GLB,,,

## 2024-04-17 PROCEDURE — 3079F DIAST BP 80-89 MM HG: CPT | Mod: CPTII,S$GLB,,

## 2024-04-17 RX ORDER — TRAMADOL HYDROCHLORIDE 50 MG/1
50 TABLET ORAL EVERY 12 HOURS PRN
Qty: 40 TABLET | Refills: 0 | Status: SHIPPED | OUTPATIENT
Start: 2024-04-17 | End: 2024-05-29 | Stop reason: SDUPTHER

## 2024-04-17 NOTE — TELEPHONE ENCOUNTER
Please schedule patient for the following procedure:    Physician - Dr Nieves    Type of Procedure/Injection - Lumbar Radiofrequency Ablation  L4/5 and L5/S1           Laterality - Bilateral      Anxiolysis- RNIV      Need to hold medication - No      N/A      Clearance needed - No      Follow up - 4 week

## 2024-04-17 NOTE — H&P (VIEW-ONLY)
[facility] Follow Up Evaluation    Referred by: Dr. Singletary  Reason for referral: back pain    CC:   Chief Complaint   Patient presents with    Low-back Pain    Leg Pain          4/17/2024    10:36 AM 3/12/2024    10:56 AM 2/23/2024     2:40 PM   Last 3 PDI Scores   Pain Disability Index (PDI) 26 22 41     Interval HPI 4/17/2024: Safia Ibarra returns to the office for follow up.  She is s/p 2nd  bilateral L4/5 and L5/S1 diagnostic medial branch block on 04/10/2024 with 80% relief lasting the duration of the anesthetic.  She reports improvement in her lower back pain during the block.  Today she is reporting continued lower back pain, 8/10, with some radiation into right leg, worse in her right lateral calf.  She denies any new numbness, weakness or any new changes to her bowel or bladder function.      Interval HPI 4/17/2024: Safia Ibarra returns to the office for follow up.  Today she reports she continues to have pain in her lower back that goes down her right leg into her right shin.  She rates her pain as 6/10, constant, sharp, stabbing.  She has not having any new numbness or weakness.    Pain intervention history:  - s/p L5-S1 NATASHA on 1/3/22 with 80% relief   - s/p L5-S1 NATASHA on 9/21/22 with 70% relief lasting for over 5 months  - s/p L5/S1 NATASHA on 4/26/23 without relief  - s/p L4/5 NATASHA on 06/05/2023 with initially 80% relief.  - s/p L4/5 NATASHA on 09/05/2023 with greater than 50% relief  - s/p 1st bilateral L4/5 and L5/S1 diagnostic medial branch blocks on 03/5/2024 with 80% relief lasting the duration of the anesthetic.  - s/p 2nd bilateral L4/5 and L5/S1 diagnostic medial branch block on 04/10/2024 with 80% relief lasting the duration of the anesthetic.    HPI:   Safia Ibarra is a 68 y.o. female who complains of back pain    Onset: 6-7 months  Inciting Event: none  Progression: since onset, pain is gradually worsening  Current Pain Score: 6/10  Typical Range: 2-10/10  Timing:  constant  Quality: shooting  Radiation: yes, down the back both legs  Associated numbness or weakness: yes numbness   Exacerbated by: walking  Allievated by: laying down  Is Pain Level Acceptable?: No    Previous Therapies:  PT/OT: yes  HEP: yes  Interventions:   Surgery:  Medications:   - NSAIDS:   - MSK Relaxants:   - TCAs:   - SNRIs:   - Topicals:   - Anticonvulsants:  - Opioids:     History:    Current Outpatient Medications:     acetaminophen (TYLENOL) 500 MG tablet, Take 500 mg by mouth every 6 (six) hours as needed for Pain., Disp: , Rfl:     ciprofloxacin HCl (CIPRO) 500 MG tablet, Take 500 mg by mouth 2 (two) times daily., Disp: , Rfl:     fesoterodine (TOVIAZ) 4 mg Tb24, Take 4 mg by mouth once daily., Disp: , Rfl:     gabapentin (NEURONTIN) 300 MG capsule, Take 300 mg by mouth 2 (two) times daily., Disp: , Rfl:     levothyroxine (SYNTHROID, LEVOTHROID) 175 MCG tablet, , Disp: , Rfl:     LIDOcaine (LIDODERM) 5 %, Place 1 patch onto the skin once daily., Disp: , Rfl:     losartan (COZAAR) 25 MG tablet, Take 0.5 tablets (12.5 mg total) by mouth every evening., Disp: 45 tablet, Rfl: 2    meloxicam (MOBIC) 15 MG tablet, Take 15 mg by mouth daily as needed for Pain., Disp: , Rfl:     nystatin (MYCOSTATIN) cream, 3 (three) times daily. Apply to affected area, Disp: , Rfl:     ondansetron (ZOFRAN-ODT) 4 MG TbDL, 4 mg every 6 (six) hours as needed., Disp: , Rfl:     pantoprazole (PROTONIX) 40 MG tablet, Take 40 mg by mouth every morning., Disp: , Rfl:     rosuvastatin (CRESTOR) 10 MG tablet, Take 10 mg by mouth nightly., Disp: , Rfl:     Past Medical History:   Diagnosis Date    HTN (hypertension)     Hyperlipidemia     Hypothyroidism     Osteoporosis        Past Surgical History:   Procedure Laterality Date    BREAST BIOPSY Left     core Dr. Camden marroquin over 5 yrs ago    EPIDURAL STEROID INJECTION INTO LUMBAR SPINE N/A 1/3/2022    Procedure: Injection-steroid-epidural-lumbar L5/S1;  Surgeon: Yobani Nieves,  MD;  Location: [facility] OR;  Service: Pain Management;  Laterality: N/A;    EPIDURAL STEROID INJECTION INTO LUMBAR SPINE N/A 9/21/2022    Procedure: Injection-steroid-epidural-lumbar L5/S1;  Surgeon: Yobani Nieves MD;  Location: [facility] OR;  Service: Pain Management;  Laterality: N/A;    EPIDURAL STEROID INJECTION INTO LUMBAR SPINE N/A 4/26/2023    Procedure: Injection-steroid-epidural-lumbar L5/S1;  Surgeon: Yobani Nieves MD;  Location: [facility] OR;  Service: Pain Management;  Laterality: N/A;    EPIDURAL STEROID INJECTION INTO LUMBAR SPINE N/A 6/5/2023    Procedure: Injection-steroid-epidural-lumbar L4/L5;  Surgeon: Yobani Nieves MD;  Location: [facility] OR;  Service: Pain Management;  Laterality: N/A;    EPIDURAL STEROID INJECTION INTO LUMBAR SPINE N/A 9/5/2023    Procedure: Injection-steroid-epidural-lumbar L4/5;  Surgeon: Yobani Nieves MD;  Location: [facility] OR;  Service: Pain Management;  Laterality: N/A;  L4/5    HYSTERECTOMY      INJECTION OF ANESTHETIC AGENT AROUND MEDIAL BRANCH NERVES INNERVATING LUMBAR FACET JOINT Bilateral 3/5/2024    Procedure: Block-nerve-medial branch-lumbar    L4/5, L5/S1;  Surgeon: Yobani Nieves MD;  Location: [facility] OR;  Service: Pain Management;  Laterality: Bilateral;    INJECTION OF ANESTHETIC AGENT AROUND MEDIAL BRANCH NERVES INNERVATING LUMBAR FACET JOINT Bilateral 4/10/2024    Procedure: Block-nerve-medial branch-lumbar-L4/5&L5/S1;  Surgeon: Yobani Nieves MD;  Location: [facility] OR;  Service: Pain Management;  Laterality: Bilateral;       Family History   Problem Relation Name Age of Onset    Uterine cancer Mother      Breast cancer Paternal Aunt         Social History     Socioeconomic History    Marital status:    Tobacco Use    Smoking status: Never    Smokeless tobacco: Never   Substance and Sexual Activity    Alcohol use: Never    Drug use: Never    Sexual activity: Not Currently       Review of patient's allergies indicates:   Allergen Reactions    Bactrim  "[sulfamethoxazole-trimethoprim] Itching       Review of Systems:  General ROS: negative for - fever  Psychological ROS: negative for - hostility  Hematological and Lymphatic ROS: negative for - bleeding problems  Endocrine ROS: negative for - unexpected weight changes  Respiratory ROS: no cough, shortness of breath, or wheezing  Cardiovascular ROS: no chest pain or dyspnea on exertion  Gastrointestinal ROS: no abdominal pain, change in bowel habits, or black or bloody stools  Musculoskeletal ROS: negative for - muscular weakness  Neurological ROS: positive for - numbness/tingling  Dermatological ROS: negative for rash    Physical Exam:  Vitals:    04/17/24 1038   BP: 134/89   Pulse: 78   Weight: 122.4 kg (269 lb 15.3 oz)   Height: 5' 1" (1.549 m)   PainSc:   8   PainLoc: Back       Body mass index is 51.01 kg/m².     Gen: NAD  Gait:  Presents in wheelchair.  Can ambulate on own.  Psych:  Mood appropriate for given condition  HEENT: eyes anicteric   GI: Abd soft  CV: RRR  Lungs: breathing unlabored   ROM: limited AROM of the L spine in all planes, full ROM at ankles, knees and hips    Sensation: intact to light touch in all dermatomes tested from L2-S1 bilaterally  Reflexes:   Palpation: No tenderness to palpation over the midline lumbar spine  Tone: normal in the b/l knees and hips   Skin: intact  Extremities: No edema in b/l ankles or hands  Provacative tests:         Right Left   L2/3 Iliacus Hip flexion  5  5   L3/4 Qudratus Femoris Knee Extension  5  5   L4/5 Tib Anterior Ankle Dorsiflexion   5  5   L5/S1 Extensor Hallicus Longus Great toe extension  5  5                 S1/S2 Gastroc/Soleus Plantar Flexion  5  5       Imaging:  Xray lumbar spine 8/4/23  FINDINGS:  Stable mild superior endplate compression fracture deformity of L5 compared to 06/20/2023.  Diffuse bony demineralization without evidence of any new or progressive fractures.  Lower lumbar predominant facet arthrosis and trace anterolisthesis of L4 " on L5 redemonstrated.  Degenerative changes most notable at the level of the visualized lower thoracic spine and disc height loss throughout the lumbar spine which is moderate at L3-L4 and L2-L3.  Broad levocurvature.    MRI thoracic spine 12/18/23  Impression:  1. Mild broad dextrocurvature of the thoracic spine.  Sagittal alignment is maintained noting mild exaggerated thoracic kyphosis.  2. Mild degenerative changes as discussed above without evidence of any significant spinal canal or foraminal narrowing.    MRI cervical spine 12/18/23  Impression:  1. Motion degraded exam limits evaluation.  No acute process.  2. Up to mild multifactorial spinal canal narrowing.  No cord impingement.  3. Uncovertebral spurring and facet arthrosis produce foraminal narrowing.  Limited evaluation suggests at least moderate to severe right and moderate left C3-C4, severe left/moderate right C4-C5, and moderate bilateral C5-C6 narrowing.    MRI lumbar spine 08/21/2023  FINDINGS:  In the interval since the prior examination there appears to have been the development of compression deformity in the L5 vertebral body with approximately 25-50% vertebral body height loss.  The relative rapid development since the prior favors a posttraumatic process to a neoplastic process.  Please clinically correlate.  This appears to predominantly involve the superior endplate of the L5 vertebral body.  There does not appear to be cortical disruption to suggest an infectious osteomyelitis process which can sometimes appear similar.     The spinal cord appears to terminate at the L1-L2 level.     Collateral vessels are partially visualized in the left upper abdomen, these collateral vessels can be from multiple etiologies including splenorenal shunting associated with liver disease and or portal hypertension.  Please clinically correlate.     At the T11-T12 level, minimal disc bulge is noted without significant central canal stenosis or neural  foraminal stenosis.  A similar appearance is noted on the prior.  At the T12-L1 level, no significant disc bulge, central canal stenosis, or neural foraminal stenosis is noted.  At the L1-L2 level, facet arthropathy and ligamentous hypertrophy is noted.  Broad-based bulging is noted towards the left neural foramen greater than right of 6 mm.  Mild right neural foraminal narrowing is noted.  Moderate left neural foraminal stenosis appears to be present with probable contact of the exiting nerve root on the left likely without significant change since the prior.  Facet arthropathy and ligamentous hypertrophy is noted.  Moderate thecal sac narrowing is noted with the thecal sac narrowed to 9 mm.  Very little fluid surrounds the cord at this level  At the L2-L3 level, facet arthropathy and ligamentous hypertrophy appears to be present.  Disc protrusion into the right lateral recess and neural foramen is noted of 5 mm.  Epidural fat is noted.  Bunching of the nerve roots is noted with very little fluid surrounding the nerve roots and moderate thecal sac stenosis to 8 mm with a similar appearance on the prior.  Broad-based uncovertebral spurring is noted bilaterally with moderate to severe right neural foraminal stenosis and probable contact of the exiting nerve root.  Mild to moderate left neural foraminal stenosis is noted.  The exiting nerve root probably exit unimpinged.  A similar appearance is suspected from prior  At the L3-L4 level, disc protrusion appears to be present into the right and left lateral recesses extending 10 mm posterior to the disc plane.  Facet arthropathy and ligamentous hypertrophy is noted.  Some epidural fat is noted.  There is bunching of the nerve roots and severe thecal sac narrowing of 5-6 mm.  Moderate to severe right neural foraminal stenosis is noted with moderate left neural foraminal stenosis.  The exiting nerve root particularly on the right may be affected.  Mild progression of  central canal narrowing may be present since the prior.  At the L4-L5 level, facet arthropathy and ligamentous hypertrophy appears to be present.  Mild broad-based bulge is noted.  Moderate bilateral neural foraminal stenosis is noted.  Contact of the exiting nerve roots may be present.  Some bunching of the nerve roots is noted with epidural fat.  Moderate to severe thecal sac narrowing is noted the 6 mm.  Moderate bilateral neural foraminal stenosis is noted with probable contact of the exiting nerve roots bilaterally.  The central canal narrowing at this level appears progressed since the prior  At the L5-S1 level, broad-based bulging is noted towards each neural foramen with mild to moderate neural foraminal narrowing similar to on the prior.  Minimal central canal narrowing is noted with thecal sac measuring approximately 10 mm unchanged since prior.       Labs:  BMP  Lab Results   Component Value Date     10/10/2023    K 4.4 10/10/2023     10/10/2023    CO2 33 (H) 10/10/2023    BUN 20 (H) 10/10/2023    CREATININE 1.04 10/10/2023    CALCIUM 8.9 10/10/2023    ANIONGAP 2 (L) 10/10/2023     Lab Results   Component Value Date    ALT 28 10/10/2023    AST 54 (H) 10/10/2023    ALKPHOS 178 (H) 10/10/2023    BILITOT 1.7 (H) 10/10/2023       Assessment:   Problem List Items Addressed This Visit    None  Visit Diagnoses       Lumbar spondylosis    -  Primary    Lumbar radiculopathy        Spinal stenosis of lumbar region with neurogenic claudication        Chronic bilateral low back pain with bilateral sciatica        Thrombocytopenia                    68 y.o. year old female presents to the office with back pain, constant, 6/10, radiating down the back of both legs.  + numbness, no weakness.    4/17/2024: Safia Ibarra returns to the office for follow up.  She is s/p 2nd  bilateral L4/5 and L5/S1 diagnostic medial branch block on 04/10/2024 with 80% relief lasting the duration of the anesthetic.  She  reports improvement in her lower back pain during the block.  Today she is reporting continued lower back pain, 8/10, with some radiation into right leg, worse in her right lateral calf.  She denies any new numbness, weakness or any new changes to her bowel or bladder function.    - on exam she has in a wheelchair.  She uses this when it is available to her but otherwise can ambulate on her own.  She has full strength in his lower extremities and intact sensation to light touch bilateral L2-S1  - I independently reviewed her lumbar MRI, She has a right paracentral disc protrusion at L3-4 with severe central canal narrowing at L3-4.  At L4-5 she has severe central canal narrowing.  She has multilevel bilateral facet arthropathy  - s/p 1st bilateral L4/5 and L5/S1 diagnostic medial branch blocks on 03/5/2024 with 80% relief lasting the duration of the anesthetic.  - s/p 2nd bilateral L4/5 and L5/S1 diagnostic medial branch block on 04/10/2024 with 80% relief lasting the duration of the anesthetic.  - during both sets of diagnostic medial branch blocks her pain prior to the block was 8/10, during block 0-2/10.  She responded well to both  - she likely also has some continued lumbar radicular pain however at this time she is dealing with chronic thrombocytopenia in are unable to perform lumbar epidural.  This pain is limiting her mobility and interfering with her ADLs and quality of life.  - to address her facet mediated pain I would like to schedule her for bilateral L4/5 and L5/S1 radiofrequency ablation.  - she continues to take tramadol sparingly for severe pain.  Refill requested today.  Refill sent to Dr. Nieves today for approval.  - follow-up 4 weeks post procedure or sooner if needed.  At that time if she resolves her thrombocytopenia can consider lumbar epidural.      :  Reviewed    The above note was completed, in part, with the aid of Dragon dictation software/hardware. Translation errors may be  present.

## 2024-04-17 NOTE — PROGRESS NOTES
[facility] Follow Up Evaluation    Referred by: Dr. Singletary  Reason for referral: back pain    CC:   Chief Complaint   Patient presents with    Low-back Pain    Leg Pain          4/17/2024    10:36 AM 3/12/2024    10:56 AM 2/23/2024     2:40 PM   Last 3 PDI Scores   Pain Disability Index (PDI) 26 22 41     Interval HPI 4/17/2024: Safia Ibarra returns to the office for follow up.  She is s/p 2nd  bilateral L4/5 and L5/S1 diagnostic medial branch block on 04/10/2024 with 80% relief lasting the duration of the anesthetic.  She reports improvement in her lower back pain during the block.  Today she is reporting continued lower back pain, 8/10, with some radiation into right leg, worse in her right lateral calf.  She denies any new numbness, weakness or any new changes to her bowel or bladder function.      Interval HPI 4/17/2024: Safia Ibarra returns to the office for follow up.  Today she reports she continues to have pain in her lower back that goes down her right leg into her right shin.  She rates her pain as 6/10, constant, sharp, stabbing.  She has not having any new numbness or weakness.    Pain intervention history:  - s/p L5-S1 NATASHA on 1/3/22 with 80% relief   - s/p L5-S1 NATASHA on 9/21/22 with 70% relief lasting for over 5 months  - s/p L5/S1 NATASHA on 4/26/23 without relief  - s/p L4/5 NATASHA on 06/05/2023 with initially 80% relief.  - s/p L4/5 NATASHA on 09/05/2023 with greater than 50% relief  - s/p 1st bilateral L4/5 and L5/S1 diagnostic medial branch blocks on 03/5/2024 with 80% relief lasting the duration of the anesthetic.  - s/p 2nd bilateral L4/5 and L5/S1 diagnostic medial branch block on 04/10/2024 with 80% relief lasting the duration of the anesthetic.    HPI:   Safia Ibarra is a 68 y.o. female who complains of back pain    Onset: 6-7 months  Inciting Event: none  Progression: since onset, pain is gradually worsening  Current Pain Score: 6/10  Typical Range: 2-10/10  Timing:  constant  Quality: shooting  Radiation: yes, down the back both legs  Associated numbness or weakness: yes numbness   Exacerbated by: walking  Allievated by: laying down  Is Pain Level Acceptable?: No    Previous Therapies:  PT/OT: yes  HEP: yes  Interventions:   Surgery:  Medications:   - NSAIDS:   - MSK Relaxants:   - TCAs:   - SNRIs:   - Topicals:   - Anticonvulsants:  - Opioids:     History:    Current Outpatient Medications:     acetaminophen (TYLENOL) 500 MG tablet, Take 500 mg by mouth every 6 (six) hours as needed for Pain., Disp: , Rfl:     ciprofloxacin HCl (CIPRO) 500 MG tablet, Take 500 mg by mouth 2 (two) times daily., Disp: , Rfl:     fesoterodine (TOVIAZ) 4 mg Tb24, Take 4 mg by mouth once daily., Disp: , Rfl:     gabapentin (NEURONTIN) 300 MG capsule, Take 300 mg by mouth 2 (two) times daily., Disp: , Rfl:     levothyroxine (SYNTHROID, LEVOTHROID) 175 MCG tablet, , Disp: , Rfl:     LIDOcaine (LIDODERM) 5 %, Place 1 patch onto the skin once daily., Disp: , Rfl:     losartan (COZAAR) 25 MG tablet, Take 0.5 tablets (12.5 mg total) by mouth every evening., Disp: 45 tablet, Rfl: 2    meloxicam (MOBIC) 15 MG tablet, Take 15 mg by mouth daily as needed for Pain., Disp: , Rfl:     nystatin (MYCOSTATIN) cream, 3 (three) times daily. Apply to affected area, Disp: , Rfl:     ondansetron (ZOFRAN-ODT) 4 MG TbDL, 4 mg every 6 (six) hours as needed., Disp: , Rfl:     pantoprazole (PROTONIX) 40 MG tablet, Take 40 mg by mouth every morning., Disp: , Rfl:     rosuvastatin (CRESTOR) 10 MG tablet, Take 10 mg by mouth nightly., Disp: , Rfl:     Past Medical History:   Diagnosis Date    HTN (hypertension)     Hyperlipidemia     Hypothyroidism     Osteoporosis        Past Surgical History:   Procedure Laterality Date    BREAST BIOPSY Left     core Dr. Camden marroquin over 5 yrs ago    EPIDURAL STEROID INJECTION INTO LUMBAR SPINE N/A 1/3/2022    Procedure: Injection-steroid-epidural-lumbar L5/S1;  Surgeon: Yobani Nieves,  MD;  Location: [facility] OR;  Service: Pain Management;  Laterality: N/A;    EPIDURAL STEROID INJECTION INTO LUMBAR SPINE N/A 9/21/2022    Procedure: Injection-steroid-epidural-lumbar L5/S1;  Surgeon: Yobani Nieves MD;  Location: [facility] OR;  Service: Pain Management;  Laterality: N/A;    EPIDURAL STEROID INJECTION INTO LUMBAR SPINE N/A 4/26/2023    Procedure: Injection-steroid-epidural-lumbar L5/S1;  Surgeon: Yobani Nieves MD;  Location: [facility] OR;  Service: Pain Management;  Laterality: N/A;    EPIDURAL STEROID INJECTION INTO LUMBAR SPINE N/A 6/5/2023    Procedure: Injection-steroid-epidural-lumbar L4/L5;  Surgeon: Yobani Nieves MD;  Location: [facility] OR;  Service: Pain Management;  Laterality: N/A;    EPIDURAL STEROID INJECTION INTO LUMBAR SPINE N/A 9/5/2023    Procedure: Injection-steroid-epidural-lumbar L4/5;  Surgeon: Yobani Nieves MD;  Location: [facility] OR;  Service: Pain Management;  Laterality: N/A;  L4/5    HYSTERECTOMY      INJECTION OF ANESTHETIC AGENT AROUND MEDIAL BRANCH NERVES INNERVATING LUMBAR FACET JOINT Bilateral 3/5/2024    Procedure: Block-nerve-medial branch-lumbar    L4/5, L5/S1;  Surgeon: Yobani Nieves MD;  Location: [facility] OR;  Service: Pain Management;  Laterality: Bilateral;    INJECTION OF ANESTHETIC AGENT AROUND MEDIAL BRANCH NERVES INNERVATING LUMBAR FACET JOINT Bilateral 4/10/2024    Procedure: Block-nerve-medial branch-lumbar-L4/5&L5/S1;  Surgeon: Yobani Nieves MD;  Location: [facility] OR;  Service: Pain Management;  Laterality: Bilateral;       Family History   Problem Relation Name Age of Onset    Uterine cancer Mother      Breast cancer Paternal Aunt         Social History     Socioeconomic History    Marital status:    Tobacco Use    Smoking status: Never    Smokeless tobacco: Never   Substance and Sexual Activity    Alcohol use: Never    Drug use: Never    Sexual activity: Not Currently       Review of patient's allergies indicates:   Allergen Reactions    Bactrim  "[sulfamethoxazole-trimethoprim] Itching       Review of Systems:  General ROS: negative for - fever  Psychological ROS: negative for - hostility  Hematological and Lymphatic ROS: negative for - bleeding problems  Endocrine ROS: negative for - unexpected weight changes  Respiratory ROS: no cough, shortness of breath, or wheezing  Cardiovascular ROS: no chest pain or dyspnea on exertion  Gastrointestinal ROS: no abdominal pain, change in bowel habits, or black or bloody stools  Musculoskeletal ROS: negative for - muscular weakness  Neurological ROS: positive for - numbness/tingling  Dermatological ROS: negative for rash    Physical Exam:  Vitals:    04/17/24 1038   BP: 134/89   Pulse: 78   Weight: 122.4 kg (269 lb 15.3 oz)   Height: 5' 1" (1.549 m)   PainSc:   8   PainLoc: Back       Body mass index is 51.01 kg/m².     Gen: NAD  Gait:  Presents in wheelchair.  Can ambulate on own.  Psych:  Mood appropriate for given condition  HEENT: eyes anicteric   GI: Abd soft  CV: RRR  Lungs: breathing unlabored   ROM: limited AROM of the L spine in all planes, full ROM at ankles, knees and hips    Sensation: intact to light touch in all dermatomes tested from L2-S1 bilaterally  Reflexes:   Palpation: No tenderness to palpation over the midline lumbar spine  Tone: normal in the b/l knees and hips   Skin: intact  Extremities: No edema in b/l ankles or hands  Provacative tests:         Right Left   L2/3 Iliacus Hip flexion  5  5   L3/4 Qudratus Femoris Knee Extension  5  5   L4/5 Tib Anterior Ankle Dorsiflexion   5  5   L5/S1 Extensor Hallicus Longus Great toe extension  5  5                 S1/S2 Gastroc/Soleus Plantar Flexion  5  5       Imaging:  Xray lumbar spine 8/4/23  FINDINGS:  Stable mild superior endplate compression fracture deformity of L5 compared to 06/20/2023.  Diffuse bony demineralization without evidence of any new or progressive fractures.  Lower lumbar predominant facet arthrosis and trace anterolisthesis of L4 " on L5 redemonstrated.  Degenerative changes most notable at the level of the visualized lower thoracic spine and disc height loss throughout the lumbar spine which is moderate at L3-L4 and L2-L3.  Broad levocurvature.    MRI thoracic spine 12/18/23  Impression:  1. Mild broad dextrocurvature of the thoracic spine.  Sagittal alignment is maintained noting mild exaggerated thoracic kyphosis.  2. Mild degenerative changes as discussed above without evidence of any significant spinal canal or foraminal narrowing.    MRI cervical spine 12/18/23  Impression:  1. Motion degraded exam limits evaluation.  No acute process.  2. Up to mild multifactorial spinal canal narrowing.  No cord impingement.  3. Uncovertebral spurring and facet arthrosis produce foraminal narrowing.  Limited evaluation suggests at least moderate to severe right and moderate left C3-C4, severe left/moderate right C4-C5, and moderate bilateral C5-C6 narrowing.    MRI lumbar spine 08/21/2023  FINDINGS:  In the interval since the prior examination there appears to have been the development of compression deformity in the L5 vertebral body with approximately 25-50% vertebral body height loss.  The relative rapid development since the prior favors a posttraumatic process to a neoplastic process.  Please clinically correlate.  This appears to predominantly involve the superior endplate of the L5 vertebral body.  There does not appear to be cortical disruption to suggest an infectious osteomyelitis process which can sometimes appear similar.     The spinal cord appears to terminate at the L1-L2 level.     Collateral vessels are partially visualized in the left upper abdomen, these collateral vessels can be from multiple etiologies including splenorenal shunting associated with liver disease and or portal hypertension.  Please clinically correlate.     At the T11-T12 level, minimal disc bulge is noted without significant central canal stenosis or neural  foraminal stenosis.  A similar appearance is noted on the prior.  At the T12-L1 level, no significant disc bulge, central canal stenosis, or neural foraminal stenosis is noted.  At the L1-L2 level, facet arthropathy and ligamentous hypertrophy is noted.  Broad-based bulging is noted towards the left neural foramen greater than right of 6 mm.  Mild right neural foraminal narrowing is noted.  Moderate left neural foraminal stenosis appears to be present with probable contact of the exiting nerve root on the left likely without significant change since the prior.  Facet arthropathy and ligamentous hypertrophy is noted.  Moderate thecal sac narrowing is noted with the thecal sac narrowed to 9 mm.  Very little fluid surrounds the cord at this level  At the L2-L3 level, facet arthropathy and ligamentous hypertrophy appears to be present.  Disc protrusion into the right lateral recess and neural foramen is noted of 5 mm.  Epidural fat is noted.  Bunching of the nerve roots is noted with very little fluid surrounding the nerve roots and moderate thecal sac stenosis to 8 mm with a similar appearance on the prior.  Broad-based uncovertebral spurring is noted bilaterally with moderate to severe right neural foraminal stenosis and probable contact of the exiting nerve root.  Mild to moderate left neural foraminal stenosis is noted.  The exiting nerve root probably exit unimpinged.  A similar appearance is suspected from prior  At the L3-L4 level, disc protrusion appears to be present into the right and left lateral recesses extending 10 mm posterior to the disc plane.  Facet arthropathy and ligamentous hypertrophy is noted.  Some epidural fat is noted.  There is bunching of the nerve roots and severe thecal sac narrowing of 5-6 mm.  Moderate to severe right neural foraminal stenosis is noted with moderate left neural foraminal stenosis.  The exiting nerve root particularly on the right may be affected.  Mild progression of  central canal narrowing may be present since the prior.  At the L4-L5 level, facet arthropathy and ligamentous hypertrophy appears to be present.  Mild broad-based bulge is noted.  Moderate bilateral neural foraminal stenosis is noted.  Contact of the exiting nerve roots may be present.  Some bunching of the nerve roots is noted with epidural fat.  Moderate to severe thecal sac narrowing is noted the 6 mm.  Moderate bilateral neural foraminal stenosis is noted with probable contact of the exiting nerve roots bilaterally.  The central canal narrowing at this level appears progressed since the prior  At the L5-S1 level, broad-based bulging is noted towards each neural foramen with mild to moderate neural foraminal narrowing similar to on the prior.  Minimal central canal narrowing is noted with thecal sac measuring approximately 10 mm unchanged since prior.       Labs:  BMP  Lab Results   Component Value Date     10/10/2023    K 4.4 10/10/2023     10/10/2023    CO2 33 (H) 10/10/2023    BUN 20 (H) 10/10/2023    CREATININE 1.04 10/10/2023    CALCIUM 8.9 10/10/2023    ANIONGAP 2 (L) 10/10/2023     Lab Results   Component Value Date    ALT 28 10/10/2023    AST 54 (H) 10/10/2023    ALKPHOS 178 (H) 10/10/2023    BILITOT 1.7 (H) 10/10/2023       Assessment:   Problem List Items Addressed This Visit    None  Visit Diagnoses       Lumbar spondylosis    -  Primary    Lumbar radiculopathy        Spinal stenosis of lumbar region with neurogenic claudication        Chronic bilateral low back pain with bilateral sciatica        Thrombocytopenia                    68 y.o. year old female presents to the office with back pain, constant, 6/10, radiating down the back of both legs.  + numbness, no weakness.    4/17/2024: Safia Ibarra returns to the office for follow up.  She is s/p 2nd  bilateral L4/5 and L5/S1 diagnostic medial branch block on 04/10/2024 with 80% relief lasting the duration of the anesthetic.  She  reports improvement in her lower back pain during the block.  Today she is reporting continued lower back pain, 8/10, with some radiation into right leg, worse in her right lateral calf.  She denies any new numbness, weakness or any new changes to her bowel or bladder function.    - on exam she has in a wheelchair.  She uses this when it is available to her but otherwise can ambulate on her own.  She has full strength in his lower extremities and intact sensation to light touch bilateral L2-S1  - I independently reviewed her lumbar MRI, She has a right paracentral disc protrusion at L3-4 with severe central canal narrowing at L3-4.  At L4-5 she has severe central canal narrowing.  She has multilevel bilateral facet arthropathy  - s/p 1st bilateral L4/5 and L5/S1 diagnostic medial branch blocks on 03/5/2024 with 80% relief lasting the duration of the anesthetic.  - s/p 2nd bilateral L4/5 and L5/S1 diagnostic medial branch block on 04/10/2024 with 80% relief lasting the duration of the anesthetic.  - during both sets of diagnostic medial branch blocks her pain prior to the block was 8/10, during block 0-2/10.  She responded well to both  - she likely also has some continued lumbar radicular pain however at this time she is dealing with chronic thrombocytopenia in are unable to perform lumbar epidural.  This pain is limiting her mobility and interfering with her ADLs and quality of life.  - to address her facet mediated pain I would like to schedule her for bilateral L4/5 and L5/S1 radiofrequency ablation.  - she continues to take tramadol sparingly for severe pain.  Refill requested today.  Refill sent to Dr. Nieves today for approval.  - follow-up 4 weeks post procedure or sooner if needed.  At that time if she resolves her thrombocytopenia can consider lumbar epidural.      :  Reviewed    The above note was completed, in part, with the aid of Dragon dictation software/hardware. Translation errors may be  present.

## 2024-04-18 DIAGNOSIS — M47.816 LUMBAR SPONDYLOSIS: Primary | ICD-10-CM

## 2024-04-18 RX ORDER — SODIUM CHLORIDE, SODIUM LACTATE, POTASSIUM CHLORIDE, CALCIUM CHLORIDE 600; 310; 30; 20 MG/100ML; MG/100ML; MG/100ML; MG/100ML
INJECTION, SOLUTION INTRAVENOUS CONTINUOUS
Status: CANCELLED | OUTPATIENT
Start: 2024-04-18

## 2024-04-23 ENCOUNTER — OFFICE VISIT (OUTPATIENT)
Dept: HEPATOLOGY | Facility: CLINIC | Age: 69
End: 2024-04-23
Payer: COMMERCIAL

## 2024-04-23 DIAGNOSIS — K74.60 LIVER CIRRHOSIS SECONDARY TO NASH: Primary | ICD-10-CM

## 2024-04-23 DIAGNOSIS — K75.81 LIVER CIRRHOSIS SECONDARY TO NASH: Primary | ICD-10-CM

## 2024-04-23 PROCEDURE — 99203 OFFICE O/P NEW LOW 30 MIN: CPT | Mod: 95,,, | Performed by: STUDENT IN AN ORGANIZED HEALTH CARE EDUCATION/TRAINING PROGRAM

## 2024-04-23 NOTE — PROGRESS NOTES
Hepatology Consult Note    The patient location is: [place]  The chief complaint leading to consultation is: cirrhosis    Visit type: audiovisual    Face to Face time with patient: 15  30 minutes of total time spent on the encounter, which includes face to face time and non-face to face time preparing to see the patient (eg, review of tests), Obtaining and/or reviewing separately obtained history, Documenting clinical information in the electronic or other health record, Independently interpreting results (not separately reported) and communicating results to the patient/family/caregiver, or Care coordination (not separately reported).     Each patient to whom he or she provides medical services by telemedicine is:  (1) informed of the relationship between the physician and patient and the respective role of any other health care provider with respect to management of the patient; and (2) notified that he or she may decline to receive medical services by telemedicine and may withdraw from such care at any time.    Referring provider: Aaareferral Self  PCP: Marcelino Bullard MD    Chief complaint: cirrhosis    HPI:  Safia Ibarra is a 68 y.o. female who was referred to Hepatology Clinic for cirrhosis.    She says that 1 month ago she was told that she had low blood counts related to her liver but does not ever recall being told that she had any liver issues.  She did have an outside CT in November 2023 for abdominal pain that showed hepatic steatosis and cirrhosis.  There was also splenomegaly suggestive of portal hypertension.    She rarely drinks alcohol and has never been a heavy drinker. She has never received a blood transfusion and has no tattoos. She denies history of IV drug use. No known family history of liver disease. She denies signs of decompensated cirrhosis including no recent abdominal distension, encephalopathy, jaundice, GI bleeding.    Past Medical History:   Diagnosis Date    HTN  (hypertension)     Hyperlipidemia     Hypothyroidism     Osteoporosis        Past Surgical History:   Procedure Laterality Date    BREAST BIOPSY Left     core Dr. Hannah benign over 5 yrs ago    EPIDURAL STEROID INJECTION INTO LUMBAR SPINE N/A 1/3/2022    Procedure: Injection-steroid-epidural-lumbar L5/S1;  Surgeon: Yobani Nieves MD;  Location: [facility] OR;  Service: Pain Management;  Laterality: N/A;    EPIDURAL STEROID INJECTION INTO LUMBAR SPINE N/A 9/21/2022    Procedure: Injection-steroid-epidural-lumbar L5/S1;  Surgeon: Yobani Nieves MD;  Location: [facility] OR;  Service: Pain Management;  Laterality: N/A;    EPIDURAL STEROID INJECTION INTO LUMBAR SPINE N/A 4/26/2023    Procedure: Injection-steroid-epidural-lumbar L5/S1;  Surgeon: Yobani Nieves MD;  Location: [facility] OR;  Service: Pain Management;  Laterality: N/A;    EPIDURAL STEROID INJECTION INTO LUMBAR SPINE N/A 6/5/2023    Procedure: Injection-steroid-epidural-lumbar L4/L5;  Surgeon: Yobani Nieves MD;  Location: [facility] OR;  Service: Pain Management;  Laterality: N/A;    EPIDURAL STEROID INJECTION INTO LUMBAR SPINE N/A 9/5/2023    Procedure: Injection-steroid-epidural-lumbar L4/5;  Surgeon: Yobani Nieves MD;  Location: [facility] OR;  Service: Pain Management;  Laterality: N/A;  L4/5    HYSTERECTOMY      INJECTION OF ANESTHETIC AGENT AROUND MEDIAL BRANCH NERVES INNERVATING LUMBAR FACET JOINT Bilateral 3/5/2024    Procedure: Block-nerve-medial branch-lumbar    L4/5, L5/S1;  Surgeon: Yobani Nieves MD;  Location: [facility] OR;  Service: Pain Management;  Laterality: Bilateral;    INJECTION OF ANESTHETIC AGENT AROUND MEDIAL BRANCH NERVES INNERVATING LUMBAR FACET JOINT Bilateral 4/10/2024    Procedure: Block-nerve-medial branch-lumbar-L4/5&L5/S1;  Surgeon: Yobani Nieves MD;  Location: [facility] OR;  Service: Pain Management;  Laterality: Bilateral;       Family History   Problem Relation Name Age of Onset    Uterine cancer Mother      Breast cancer Paternal Aunt          Social History     Tobacco Use    Smoking status: Never    Smokeless tobacco: Never   Substance Use Topics    Alcohol use: Never    Drug use: Never       Current Outpatient Medications   Medication Sig Dispense Refill    acetaminophen (TYLENOL) 500 MG tablet Take 500 mg by mouth every 6 (six) hours as needed for Pain.      ciprofloxacin HCl (CIPRO) 500 MG tablet Take 500 mg by mouth 2 (two) times daily.      fesoterodine (TOVIAZ) 4 mg Tb24 Take 4 mg by mouth once daily.      gabapentin (NEURONTIN) 300 MG capsule Take 300 mg by mouth 2 (two) times daily.      levothyroxine (SYNTHROID, LEVOTHROID) 175 MCG tablet       LIDOcaine (LIDODERM) 5 % Place 1 patch onto the skin once daily.      losartan (COZAAR) 25 MG tablet Take 0.5 tablets (12.5 mg total) by mouth every evening. 45 tablet 2    meloxicam (MOBIC) 15 MG tablet Take 15 mg by mouth daily as needed for Pain.      nystatin (MYCOSTATIN) cream 3 (three) times daily. Apply to affected area      ondansetron (ZOFRAN-ODT) 4 MG TbDL 4 mg every 6 (six) hours as needed.      pantoprazole (PROTONIX) 40 MG tablet Take 40 mg by mouth every morning.      rosuvastatin (CRESTOR) 10 MG tablet Take 10 mg by mouth nightly.      traMADoL (ULTRAM) 50 mg tablet Take 1 tablet (50 mg total) by mouth every 12 (twelve) hours as needed for Pain. 40 tablet 0     No current facility-administered medications for this visit.       Review of patient's allergies indicates:   Allergen Reactions    Bactrim [sulfamethoxazole-trimethoprim] Itching       Review of Systems   Constitutional:  Negative for fever and weight loss.   Cardiovascular:  Negative for leg swelling.   Gastrointestinal:  Negative for abdominal pain, blood in stool, constipation, diarrhea, heartburn, melena, nausea and vomiting.       There were no vitals filed for this visit.    Physical Exam  Constitutional:       General: She is not in acute distress.     Appearance: She is not ill-appearing.   HENT:      Head:  "Normocephalic and atraumatic.   Eyes:      General: No scleral icterus.     Extraocular Movements: Extraocular movements intact.   Pulmonary:      Effort: No respiratory distress.   Skin:     Coloration: Skin is not jaundiced.   Neurological:      Mental Status: She is alert.         LABS: I personally reviewed pertinent laboratory findings.    Lab Results   Component Value Date    WBC 4.64 03/12/2024    HGB 10.8 (L) 03/12/2024    HCT 34.4 (L) 03/12/2024    MCV 89 03/12/2024    PLT 88 (L) 03/12/2024       Lab Results   Component Value Date     10/10/2023    K 4.4 10/10/2023     10/10/2023    CO2 33 (H) 10/10/2023    BUN 20 (H) 10/10/2023    CREATININE 1.04 10/10/2023    CALCIUM 8.9 10/10/2023    ANIONGAP 2 (L) 10/10/2023       Lab Results   Component Value Date    ALT 28 10/10/2023    AST 54 (H) 10/10/2023    ALKPHOS 178 (H) 10/10/2023    BILITOT 1.7 (H) 10/10/2023       No results found for: "HAV", "HEPAIGM", "HEPBIGM", "HEPBCAB", "HBEAG", "HEPCAB"    No results found for: "DANAY", "MITOAB", "SMOOTHMUSCAB", "IGG", "CERULOPLSM"     Computed MELD 3.0 unavailable. One or more values for this score either were not found within the given timeframe or did not fit some other criterion.  Computed MELD-Na unavailable. One or more values for this score either were not found within the given timeframe or did not fit some other criterion.       IMAGING: I personally reviewed imaging studies.      Assessment:  68 y.o. 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 She has no decompensating features.    1. 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 Counseled on diet, weight loss, and control of co-morbidities. Encouraged to lose 10% of her body weight over the next 12 months.    Ascites/Edema: Not an active issue    Encephalopathy: Not an active issue    Transplant candidacy:  Suspect MELD will be too low to " warrant transplant evaluation.    Pain management: She reports chronic back pain and is followed by pain medicine. She is ok to have local treatment and moderate sedation if needed from a liver standpoint. Would avoid general anesthesia for elective procedures given increased risk of hepatic decompensation and death.    Cirrhosis HM  - HCC screening: CT in 11/2023 without HCC. Check AFP. Repeat abdominal US and AFP every 6 months.    - Variceal screening: EGD ordered today.    - Immunizations: Recommend HAV and HBV vaccinations if not immune.    Return to clinic in 6 months.    This note includes dictation done using [employer] speech recognition program. Word recognition mistakes are occasionally missed on review.    Bradley Aquino MD  Staff Physician  Hepatology and Liver Transplant  [facility]  [facility]

## 2024-04-24 ENCOUNTER — TELEPHONE (OUTPATIENT)
Dept: GASTROENTEROLOGY | Facility: CLINIC | Age: 69
End: 2024-04-24
Payer: MEDICARE

## 2024-04-24 ENCOUNTER — TELEPHONE (OUTPATIENT)
Dept: HEPATOLOGY | Facility: CLINIC | Age: 69
End: 2024-04-24
Payer: MEDICARE

## 2024-04-24 NOTE — TELEPHONE ENCOUNTER
BMI: 51.01  I call Ms. Ibarra to schedule a colonoscopy/EGD as ordered by primary care physician. Patient doesn't answer. I leave patient a brief voicemail to call back. Provide pt with our callback number. [facility] message will be sent if active.  Schedule at [facility]

## 2024-04-24 NOTE — TELEPHONE ENCOUNTER
Reached out to pt in regards to scheduling. Pt vu and appts scheduled. Recall placed   ----- Message from Bradley Aquino MD sent at 4/24/2024 11:52 AM CDT -----  Labs and US 1-2 weeks. Return 6 months with repeat labs and US.

## 2024-05-01 ENCOUNTER — HOSPITAL ENCOUNTER (OUTPATIENT)
Facility: HOSPITAL | Age: 69
Discharge: HOME | End: 2024-05-01
Attending: ANESTHESIOLOGY | Admitting: ANESTHESIOLOGY
Payer: COMMERCIAL

## 2024-05-01 ENCOUNTER — HOSPITAL ENCOUNTER (OUTPATIENT)
Dept: RADIOLOGY | Facility: HOSPITAL | Age: 69
Discharge: HOME | End: 2024-05-01
Attending: ANESTHESIOLOGY | Admitting: ANESTHESIOLOGY
Payer: MEDICARE

## 2024-05-01 VITALS
TEMPERATURE: 97 F | SYSTOLIC BLOOD PRESSURE: 138 MMHG | HEART RATE: 70 BPM | OXYGEN SATURATION: 100 % | RESPIRATION RATE: 20 BRPM | BODY MASS INDEX: 50.79 KG/M2 | HEIGHT: 61 IN | WEIGHT: 269 LBS | DIASTOLIC BLOOD PRESSURE: 80 MMHG

## 2024-05-01 DIAGNOSIS — M54.50 LOWER BACK PAIN: ICD-10-CM

## 2024-05-01 DIAGNOSIS — M47.816 LUMBAR SPONDYLOSIS: Primary | ICD-10-CM

## 2024-05-01 PROCEDURE — 99152 MOD SED SAME PHYS/QHP 5/>YRS: CPT | Mod: ,,, | Performed by: ANESTHESIOLOGY

## 2024-05-01 PROCEDURE — 64635 DESTROY LUMB/SAC FACET JNT: CPT | Mod: 50,PO | Performed by: ANESTHESIOLOGY

## 2024-05-01 PROCEDURE — 63600175 PHARM REV CODE 636 W HCPCS: Mod: PO | Performed by: ANESTHESIOLOGY

## 2024-05-01 PROCEDURE — 76000 FLUOROSCOPY <1 HR PHYS/QHP: CPT | Mod: TC,PO

## 2024-05-01 PROCEDURE — 64636 DESTROY L/S FACET JNT ADDL: CPT | Mod: 50,,, | Performed by: ANESTHESIOLOGY

## 2024-05-01 PROCEDURE — 25000003 PHARM REV CODE 250: Mod: PO | Performed by: ANESTHESIOLOGY

## 2024-05-01 PROCEDURE — 64635 DESTROY LUMB/SAC FACET JNT: CPT | Mod: 50,,, | Performed by: ANESTHESIOLOGY

## 2024-05-01 PROCEDURE — 64636 DESTROY L/S FACET JNT ADDL: CPT | Mod: 50,PO | Performed by: ANESTHESIOLOGY

## 2024-05-01 RX ORDER — MIDAZOLAM HYDROCHLORIDE 1 MG/ML
INJECTION INTRAMUSCULAR; INTRAVENOUS
Status: DISCONTINUED | OUTPATIENT
Start: 2024-05-01 | End: 2024-05-01 | Stop reason: HOSPADM

## 2024-05-01 RX ORDER — SODIUM CHLORIDE, SODIUM LACTATE, POTASSIUM CHLORIDE, CALCIUM CHLORIDE 600; 310; 30; 20 MG/100ML; MG/100ML; MG/100ML; MG/100ML
INJECTION, SOLUTION INTRAVENOUS CONTINUOUS
Status: DISCONTINUED | OUTPATIENT
Start: 2024-05-01 | End: 2024-05-01 | Stop reason: HOSPADM

## 2024-05-01 RX ORDER — LIDOCAINE HYDROCHLORIDE 10 MG/ML
INJECTION, SOLUTION EPIDURAL; INFILTRATION; INTRACAUDAL; PERINEURAL
Status: DISCONTINUED | OUTPATIENT
Start: 2024-05-01 | End: 2024-05-01 | Stop reason: HOSPADM

## 2024-05-01 RX ORDER — TRIAMCINOLONE ACETONIDE 40 MG/ML
INJECTION, SUSPENSION INTRA-ARTICULAR; INTRAMUSCULAR
Status: DISCONTINUED | OUTPATIENT
Start: 2024-05-01 | End: 2024-05-01 | Stop reason: HOSPADM

## 2024-05-01 RX ORDER — FENTANYL CITRATE 50 UG/ML
INJECTION, SOLUTION INTRAMUSCULAR; INTRAVENOUS
Status: DISCONTINUED | OUTPATIENT
Start: 2024-05-01 | End: 2024-05-01 | Stop reason: HOSPADM

## 2024-05-01 RX ORDER — BUPIVACAINE HYDROCHLORIDE 2.5 MG/ML
INJECTION, SOLUTION EPIDURAL; INFILTRATION; INTRACAUDAL
Status: DISCONTINUED | OUTPATIENT
Start: 2024-05-01 | End: 2024-05-01 | Stop reason: HOSPADM

## 2024-05-01 RX ADMIN — SODIUM CHLORIDE, POTASSIUM CHLORIDE, SODIUM LACTATE AND CALCIUM CHLORIDE: 600; 310; 30; 20 INJECTION, SOLUTION INTRAVENOUS at 01:05

## 2024-05-01 NOTE — INTERVAL H&P NOTE
The patient has been examined and the H&P has been reviewed:    I concur with the findings and no changes have occurred since H&P was written.  The risks and benefits of this intervention, and alternative therapies were discussed with the patient.  The discussion of risks included infection, bleeding, need for additional procedures or surgery, nerve damage.  Questions regarding the procedure, risks, expected outcome, and possible side effects were solicited and answered to the patient's satisfaction.  Safia Keen wishes to proceed with the injection or procedure.  Written consent was obtained.  ASA 3, MP II        There are no hospital problems to display for this patient.

## 2024-05-01 NOTE — PLAN OF CARE
Pt VSS, denies recent fever or illness. Belongings placed under stretcher, cellphone sent with family. Consent to be signed by pt. Pt ready for procedure.

## 2024-05-01 NOTE — DISCHARGE SUMMARY
[facility]  Discharge Note  Short Stay    Admit Date: 5/1/2024    Discharge Date: 5/1/2024    Attending Physician: Yobani Nieves     Discharge Provider: Yobani Nieves    Diagnoses:  There are no hospital problems to display for this patient.      Discharged Condition: Good    Final Diagnoses: Lumbar spondylosis [M47.816]    Disposition: Home or Self Care    Hospital Course: No complications, uneventful    Outcome of Hospitalization, Treatment, Procedure, or Surgery:  Patient was admitted for outpatient interventional pain management procedure. The patient tolerated the procedure well with no complications.    Follow up/Patient Instructions:  Follow up as scheduled in Pain Management office in 2-3 weeks.  Patient has received instructions and follow up date and time.    Medications:  Continue previous medications    Discharge Procedure Orders   Notify your health care provider if you experience any of the following:  temperature >100.4     Notify your health care provider if you experience any of the following:  persistent nausea and vomiting or diarrhea     Notify your health care provider if you experience any of the following:  severe uncontrolled pain     Notify your health care provider if you experience any of the following:  redness, tenderness, or signs of infection (pain, swelling, redness, odor or green/yellow discharge around incision site)     Notify your health care provider if you experience any of the following:  difficulty breathing or increased cough     Notify your health care provider if you experience any of the following:  severe persistent headache     Notify your health care provider if you experience any of the following:  worsening rash     Notify your health care provider if you experience any of the following:  persistent dizziness, light-headedness, or visual disturbances     Notify your health care provider if you experience any of the following:  increased confusion or  weakness     Activity as tolerated

## 2024-05-01 NOTE — OP NOTE
Procedure Note    Procedure Date: 5/1/2024    Procedure Performed:  Radiofrequency ablation of the L4/5 and L5/S1 medial branch nerves on the  bilateral side utilizing fluoroscopy    Indication: The patient has clinical and radiologic findings suggestive of facet mediated pain and is s/p 2nd diagnostic bilateral lumbar L4/5 and L5/S1 medial branch blocks with at least 80% relief of their axial back pain lasting the duration of the local anesthetic utilized.     Pre-op diagnosis: Lumbar Spondylosis    Post-op diagnosis: same    Physician: Yobani Nieves MD    IV Sedation medications: Moderate sedation was achieved with midazolam 2 mg and fentanyl 50 mcg.  Continuous monitoring of EKG, blood pressure and pulse oximetry was provided by a registered nurse during the entire course of the procedure under my supervision and recorded in the patient's medical record.   Total time for sedation was 25 minutes.    Medications injected:  Pre-lesion, 1ml of 2% lidocaine at each level.  From a mixture of 5ml of 0.25% bupivacaine and 40mg of methylprednisolone, 1ml of this solution was injected at each level post-lesion.    Local anesthetic used: 1% Lidocaine, 4 ml    Estimated Blood Loss: none    Complications:  none    Technique:  The patient was interviewed in the holding area and Risks/Benefits were discussed, including, but not limited to, the possibility of new or different pain, bleeding or infection.   All questions were answered.  The patient understood and accepted risks.  Consent was reviewed.  A time out was taken to identify the patient, procedure and side of the procedure. The patient was placed in a prone position, then prepped and draped in the usual sterile fashion using ChloraPrep x2 and sterile towels.  The levels were determined under fluoroscopic guidance and then marked.  AP and oblique fluoroscopy were used to identify and vinh the junctions between the superior articular processes and transverse processes at  the L3-5 levels on the Bilateral side.  The Bilateral sacral ala was also identified and marked.  The skin and subcutaneous tissues in these identified areas were anesthetized with 1% lidocaine. A 20-gauge 100 mm TouchLocal RF needle was advanced towards each of these points under fluoroscopic guidance such that the tip of the needle was positioned posterior to the Neuro-foramen on lateral fluoroscopic view.  Then, each needle was positioned such that, on stimulation, the patient had an appropriate pain response between 0.3-0.5 V, with no motor response, other than Lumbar Paraspinal Muscles up to 2.0V.  One mL of 2% lidocaine was then injected at each level prior to lesioning, which was performed for 90 seconds at 80°C.  Once the lesion was complete, 1 mL of a solution consisting of 5 mL 0.25% bupivacaine and 40mg methylprednisolone was injected through each probe. The probes were removed with a 1% lidocaine flush.  The patient tolerated the procedure well.  The patient was monitored after the procedure.  Patient was given post procedure and discharge instructions to follow at home.  The patient was discharged in a stable condition.    Event Time In   In Facility 1218   In Pre-Procedure 1328   Physician Available    Anesthesia Available    Pre-Op: Bedside Procedure Start    Pre-Op: Bedside Procedure Stop    Pre-Procedure Complete 1352   Out of Pre-Procedure    Anesthesia Start    Anesthesia Start Data Collection    Setup Start    Setup Complete    In Room 1402   Prep Start    Procedure Prep Complete    MD notified pt. ready    Procedure Start 1409   Procedure Closing    Emergence    Procedure Finish 1426   Sedation Start 1401   Scope In    Extent Reached    Scope Out    Sedation End 1426   Out of Room 1428   Cleanup Start    Cleanup Complete    Cosmetic Start    Cosmetic Stop    Pain Mgmt In Room    Pain Mgmt Out Room    In Recovery    Anesthesia Finish    Bedside Procedure Start    Bedside Procedure Stop    Recovery  Care Complete    Out of Recovery    To Phase II    In Phase II    Pain Mgmt Recovery Start    Pain Mgmt Recovery Stop    Obs Rec Start    Obs Rec Stop    Phase II Care Complete    Out of Phase II    Procedural Care Complete    Discharge    Pain Follow Up Needed    Pain Follow Up Complete

## 2024-05-06 ENCOUNTER — HOSPITAL ENCOUNTER (OUTPATIENT)
Dept: RADIOLOGY | Facility: HOSPITAL | Age: 69
Discharge: HOME | End: 2024-05-06
Attending: STUDENT IN AN ORGANIZED HEALTH CARE EDUCATION/TRAINING PROGRAM
Payer: COMMERCIAL

## 2024-05-06 DIAGNOSIS — K75.81 LIVER CIRRHOSIS SECONDARY TO NASH: ICD-10-CM

## 2024-05-06 DIAGNOSIS — K74.60 LIVER CIRRHOSIS SECONDARY TO NASH: ICD-10-CM

## 2024-05-06 PROCEDURE — 76705 ECHO EXAM OF ABDOMEN: CPT | Mod: 26,,, | Performed by: RADIOLOGY

## 2024-05-06 PROCEDURE — 76705 ECHO EXAM OF ABDOMEN: CPT | Mod: TC,PO

## 2024-05-07 ENCOUNTER — TELEPHONE (OUTPATIENT)
Dept: GASTROENTEROLOGY | Facility: CLINIC | Age: 69
End: 2024-05-07
Payer: MEDICARE

## 2024-05-09 ENCOUNTER — TELEPHONE (OUTPATIENT)
Dept: HEPATOLOGY | Facility: CLINIC | Age: 69
End: 2024-05-09
Payer: MEDICARE

## 2024-05-09 NOTE — TELEPHONE ENCOUNTER
The patient contacted to schedule an appointment with provider.  An appointment has been scheduled with Dr. Bradley Aquino on Monday, May 20, 2024 at 11AM (Virtual).  A copy of the appointment has been mailed out.

## 2024-05-10 ENCOUNTER — TELEPHONE (OUTPATIENT)
Dept: HEPATOLOGY | Facility: CLINIC | Age: 69
End: 2024-05-10
Payer: MEDICARE

## 2024-05-10 NOTE — TELEPHONE ENCOUNTER
Call returned to the patient at [phone].  Message relayed from Dr Aquino.  Dr Aquino will send you result message on your Portal. As discussed in April visit, follow up in 6 months in Oct with Labs and US.  No need for appt in May. Appt cancelled.    Patient verbalized understanding.  US and Labs scheduled in [place] as requested on 10/11/24.  You will get a Recall letter in Aug to schedule your appt with Dr Aquino in Oct.  Voiced understanding.

## 2024-05-29 ENCOUNTER — OFFICE VISIT (OUTPATIENT)
Dept: PAIN MEDICINE | Facility: CLINIC | Age: 69
End: 2024-05-29
Payer: COMMERCIAL

## 2024-05-29 ENCOUNTER — TELEPHONE (OUTPATIENT)
Dept: PAIN MEDICINE | Facility: CLINIC | Age: 69
End: 2024-05-29

## 2024-05-29 VITALS
HEIGHT: 61 IN | DIASTOLIC BLOOD PRESSURE: 67 MMHG | BODY MASS INDEX: 50.78 KG/M2 | HEART RATE: 64 BPM | WEIGHT: 268.94 LBS | SYSTOLIC BLOOD PRESSURE: 158 MMHG

## 2024-05-29 DIAGNOSIS — M54.42 CHRONIC BILATERAL LOW BACK PAIN WITH BILATERAL SCIATICA: ICD-10-CM

## 2024-05-29 DIAGNOSIS — M54.16 LUMBAR RADICULOPATHY: Primary | ICD-10-CM

## 2024-05-29 DIAGNOSIS — G89.29 CHRONIC BILATERAL LOW BACK PAIN WITH BILATERAL SCIATICA: ICD-10-CM

## 2024-05-29 DIAGNOSIS — D69.6 THROMBOCYTOPENIA: ICD-10-CM

## 2024-05-29 DIAGNOSIS — M48.062 SPINAL STENOSIS OF LUMBAR REGION WITH NEUROGENIC CLAUDICATION: ICD-10-CM

## 2024-05-29 DIAGNOSIS — M54.41 CHRONIC BILATERAL LOW BACK PAIN WITH BILATERAL SCIATICA: ICD-10-CM

## 2024-05-29 DIAGNOSIS — M54.16 LUMBAR RADICULOPATHY: ICD-10-CM

## 2024-05-29 DIAGNOSIS — M47.816 LUMBAR SPONDYLOSIS: ICD-10-CM

## 2024-05-29 PROCEDURE — 99214 OFFICE O/P EST MOD 30 MIN: CPT | Mod: S$GLB,,,

## 2024-05-29 PROCEDURE — 3008F BODY MASS INDEX DOCD: CPT | Mod: CPTII,S$GLB,,

## 2024-05-29 PROCEDURE — 3078F DIAST BP <80 MM HG: CPT | Mod: CPTII,S$GLB,,

## 2024-05-29 PROCEDURE — 1101F PT FALLS ASSESS-DOCD LE1/YR: CPT | Mod: CPTII,S$GLB,,

## 2024-05-29 PROCEDURE — 99999 PR PBB SHADOW E&M-EST. PATIENT-LVL III: CPT | Mod: PBBFAC,,,

## 2024-05-29 PROCEDURE — 4010F ACE/ARB THERAPY RXD/TAKEN: CPT | Mod: CPTII,S$GLB,,

## 2024-05-29 PROCEDURE — 1159F MED LIST DOCD IN RCRD: CPT | Mod: CPTII,S$GLB,,

## 2024-05-29 PROCEDURE — 1125F AMNT PAIN NOTED PAIN PRSNT: CPT | Mod: CPTII,S$GLB,,

## 2024-05-29 PROCEDURE — 3077F SYST BP >= 140 MM HG: CPT | Mod: CPTII,S$GLB,,

## 2024-05-29 PROCEDURE — 3288F FALL RISK ASSESSMENT DOCD: CPT | Mod: CPTII,S$GLB,,

## 2024-05-29 RX ORDER — MELOXICAM 15 MG/1
15 TABLET ORAL DAILY PRN
Qty: 30 TABLET | Refills: 1 | Status: SHIPPED | OUTPATIENT
Start: 2024-05-29 | End: 2024-06-28

## 2024-05-29 RX ORDER — TRAMADOL HYDROCHLORIDE 50 MG/1
50 TABLET ORAL EVERY 12 HOURS PRN
Qty: 40 TABLET | Refills: 0 | Status: SHIPPED | OUTPATIENT
Start: 2024-05-29 | End: 2024-06-18

## 2024-05-29 NOTE — TELEPHONE ENCOUNTER
Please schedule patient for the following procedure:    Physician - Dr Nieves    Type of Procedure/Injection - Lumbar Epidural  L4/5           Laterality - NA      Anxiolysis- Local      Need to hold medication - Yes      NSAIDs for 2 days      Clearance needed - No      Follow up - 2 week

## 2024-05-29 NOTE — PROGRESS NOTES
[facility] Follow Up Evaluation    Referred by: Dr. Singletary  Reason for referral: back pain    CC:   Chief Complaint   Patient presents with    Low-back Pain          5/29/2024     9:27 AM 4/17/2024    10:36 AM 3/12/2024    10:56 AM   Last 3 PDI Scores   Pain Disability Index (PDI) 20 26 22     Interval HPI 5/29/2024: Safia Ibarra returns to the office for follow up.  She is s/p bilateral L4/5 and L5/S1 RFA on 05/01/2024 with 50% relief.  Previous pain across her lower back has significantly improved.  She is able to stand and walk for longer periods of time than previously.  She is also reporting continued right lower back pain with radiation into right buttock and into right leg, 7/10, constant, worsened with physical activities.  She continues to take gabapentin and tramadol without significant relief of her pain.  No relief with Mobic.        Pain intervention history:  - s/p L5-S1 NATASHA on 1/3/22 with 80% relief   - s/p L5-S1 NATASHA on 9/21/22 with 70% relief lasting for over 5 months  - s/p L5/S1 NATASHA on 4/26/23 without relief  - s/p L4/5 NATASHA on 06/05/2023 with initially 80% relief.  - s/p L4/5 NATASHA on 09/05/2023 with greater than 50% relief  - s/p 1st bilateral L4/5 and L5/S1 diagnostic medial branch blocks on 03/5/2024 with 80% relief lasting the duration of the anesthetic.  - s/p 2nd bilateral L4/5 and L5/S1 diagnostic medial branch block on 04/10/2024 with 80% relief lasting the duration of the anesthetic.  - s/p bilateral L4/5 and L5/S1 RFA on 05/01/2024 with 50% relief.    HPI:   Safia Ibarra is a 69 y.o. female who complains of back pain    Onset: 6-7 months  Inciting Event: none  Progression: since onset, pain is gradually worsening  Current Pain Score: 6/10  Typical Range: 2-10/10  Timing: constant  Quality: shooting  Radiation: yes, down the back both legs  Associated numbness or weakness: yes numbness   Exacerbated by: walking  Allievated by: laying down  Is Pain Level Acceptable?:  No    Previous Therapies:  PT/OT: yes  HEP: yes  Interventions:   Surgery:  Medications:   - NSAIDS:   - MSK Relaxants:   - TCAs:   - SNRIs:   - Topicals:   - Anticonvulsants:  - Opioids:     History:    Current Outpatient Medications:     acetaminophen (TYLENOL) 500 MG tablet, Take 500 mg by mouth every 6 (six) hours as needed for Pain., Disp: , Rfl:     fesoterodine (TOVIAZ) 4 mg Tb24, Take 4 mg by mouth once daily., Disp: , Rfl:     gabapentin (NEURONTIN) 300 MG capsule, Take 300 mg by mouth 2 (two) times daily., Disp: , Rfl:     levothyroxine (SYNTHROID, LEVOTHROID) 175 MCG tablet, , Disp: , Rfl:     LIDOcaine (LIDODERM) 5 %, Place 1 patch onto the skin once daily., Disp: , Rfl:     losartan (COZAAR) 25 MG tablet, Take 0.5 tablets (12.5 mg total) by mouth every evening., Disp: 45 tablet, Rfl: 2    nystatin (MYCOSTATIN) cream, 3 (three) times daily. Apply to affected area, Disp: , Rfl:     ondansetron (ZOFRAN-ODT) 4 MG TbDL, 4 mg every 6 (six) hours as needed., Disp: , Rfl:     pantoprazole (PROTONIX) 40 MG tablet, Take 40 mg by mouth every morning., Disp: , Rfl:     rosuvastatin (CRESTOR) 10 MG tablet, Take 10 mg by mouth nightly., Disp: , Rfl:     meloxicam (MOBIC) 15 MG tablet, Take 1 tablet (15 mg total) by mouth daily as needed for Pain., Disp: 30 tablet, Rfl: 01    Past Medical History:   Diagnosis Date    HTN (hypertension)     Hyperlipidemia     Hypothyroidism     Osteoporosis        Past Surgical History:   Procedure Laterality Date    BREAST BIOPSY Left     core Dr. Hannah benign over 5 yrs ago    EPIDURAL STEROID INJECTION INTO LUMBAR SPINE N/A 1/3/2022    Procedure: Injection-steroid-epidural-lumbar L5/S1;  Surgeon: Yobani Nieves MD;  Location: [facility] OR;  Service: Pain Management;  Laterality: N/A;    EPIDURAL STEROID INJECTION INTO LUMBAR SPINE N/A 9/21/2022    Procedure: Injection-steroid-epidural-lumbar L5/S1;  Surgeon: Yobani Nieves MD;  Location: [facility] OR;  Service: Pain Management;   Laterality: N/A;    EPIDURAL STEROID INJECTION INTO LUMBAR SPINE N/A 4/26/2023    Procedure: Injection-steroid-epidural-lumbar L5/S1;  Surgeon: Yobani Nieves MD;  Location: [facility] OR;  Service: Pain Management;  Laterality: N/A;    EPIDURAL STEROID INJECTION INTO LUMBAR SPINE N/A 6/5/2023    Procedure: Injection-steroid-epidural-lumbar L4/L5;  Surgeon: Yobani Nieves MD;  Location: [facility] OR;  Service: Pain Management;  Laterality: N/A;    EPIDURAL STEROID INJECTION INTO LUMBAR SPINE N/A 9/5/2023    Procedure: Injection-steroid-epidural-lumbar L4/5;  Surgeon: Yobani Nieves MD;  Location: [facility] OR;  Service: Pain Management;  Laterality: N/A;  L4/5    HYSTERECTOMY      INJECTION OF ANESTHETIC AGENT AROUND MEDIAL BRANCH NERVES INNERVATING LUMBAR FACET JOINT Bilateral 3/5/2024    Procedure: Block-nerve-medial branch-lumbar    L4/5, L5/S1;  Surgeon: Yobani Nieves MD;  Location: [facility] OR;  Service: Pain Management;  Laterality: Bilateral;    INJECTION OF ANESTHETIC AGENT AROUND MEDIAL BRANCH NERVES INNERVATING LUMBAR FACET JOINT Bilateral 4/10/2024    Procedure: Block-nerve-medial branch-lumbar-L4/5&L5/S1;  Surgeon: Yobani Nieves MD;  Location: [facility] OR;  Service: Pain Management;  Laterality: Bilateral;    RADIOFREQUENCY ABLATION OF LUMBAR MEDIAL BRANCH NERVE AT SINGLE LEVEL Bilateral 5/1/2024    Procedure: Radiofrequency Ablation, Nerve, Spinal, Lumbar, Medial Branch, L4/5, L5/S1;  Surgeon: Yobani Nieves MD;  Location: [facility] OR;  Service: Pain Management;  Laterality: Bilateral;       Family History   Problem Relation Name Age of Onset    Uterine cancer Mother      Breast cancer Paternal Aunt         Social History     Socioeconomic History    Marital status:    Tobacco Use    Smoking status: Never    Smokeless tobacco: Never   Substance and Sexual Activity    Alcohol use: Never    Drug use: Never    Sexual activity: Not Currently     Social Determinants of Health     Financial Resource Strain: Medium  "Risk (4/17/2024)    Overall Financial Resource Strain (CARDIA)     Difficulty of Paying Living Expenses: Somewhat hard   Food Insecurity: No Food Insecurity (4/17/2024)    Hunger Vital Sign     Worried About Running Out of Food in the Last Year: Never true     Ran Out of Food in the Last Year: Never true   Transportation Needs: Unmet Transportation Needs (4/17/2024)    PRAPARE - Transportation     Lack of Transportation (Medical): Yes     Lack of Transportation (Non-Medical): Yes   Physical Activity: Unknown (4/17/2024)    Exercise Vital Sign     Days of Exercise per Week: 2 days   Stress: Stress Concern Present (4/17/2024)    [employer] - Occupational Stress Questionnaire     Feeling of Stress : Very much   Housing Stability: High Risk (4/17/2024)    Housing Stability Vital Sign     Unable to Pay for Housing in the Last Year: Yes       Review of patient's allergies indicates:   Allergen Reactions    Bactrim [sulfamethoxazole-trimethoprim] Itching       Review of Systems:  General ROS: negative for - fever  Psychological ROS: negative for - hostility  Hematological and Lymphatic ROS: negative for - bleeding problems  Endocrine ROS: negative for - unexpected weight changes  Respiratory ROS: no cough, shortness of breath, or wheezing  Cardiovascular ROS: no chest pain or dyspnea on exertion  Gastrointestinal ROS: no abdominal pain, change in bowel habits, or black or bloody stools  Musculoskeletal ROS: negative for - muscular weakness  Neurological ROS: positive for - numbness/tingling  Dermatological ROS: negative for rash    Physical Exam:  Vitals:    05/29/24 0929   BP: (!) 158/67   Pulse: 64   Weight: 122 kg (268 lb 15.4 oz)   Height: 5' 1" (1.549 m)   PainSc:   7   PainLoc: Back         Body mass index is 50.82 kg/m².     Gen: NAD  Gait:  Presents in wheelchair.  Can ambulate on own.  Psych:  Mood appropriate for given condition  HEENT: eyes anicteric   GI: Abd soft  CV: RRR  Lungs: " breathing unlabored   ROM: limited AROM of the L spine in all planes, full ROM at ankles, knees and hips    Sensation: intact to light touch in all dermatomes tested from L2-S1 bilaterally  Reflexes:   Palpation: No tenderness to palpation over the midline lumbar spine  Tone: normal in the b/l knees and hips   Skin: intact  Extremities: No edema in b/l ankles or hands  Provacative tests:         Right Left   L2/3 Iliacus Hip flexion  5  5   L3/4 Qudratus Femoris Knee Extension  5  5   L4/5 Tib Anterior Ankle Dorsiflexion   5  5   L5/S1 Extensor Hallicus Longus Great toe extension  5  5                 S1/S2 Gastroc/Soleus Plantar Flexion  5  5       Imaging:  Xray lumbar spine 8/4/23  FINDINGS:  Stable mild superior endplate compression fracture deformity of L5 compared to 06/20/2023.  Diffuse bony demineralization without evidence of any new or progressive fractures.  Lower lumbar predominant facet arthrosis and trace anterolisthesis of L4 on L5 redemonstrated.  Degenerative changes most notable at the level of the visualized lower thoracic spine and disc height loss throughout the lumbar spine which is moderate at L3-L4 and L2-L3.  Broad levocurvature.    MRI thoracic spine 12/18/23  Impression:  1. Mild broad dextrocurvature of the thoracic spine.  Sagittal alignment is maintained noting mild exaggerated thoracic kyphosis.  2. Mild degenerative changes as discussed above without evidence of any significant spinal canal or foraminal narrowing.    MRI cervical spine 12/18/23  Impression:  1. Motion degraded exam limits evaluation.  No acute process.  2. Up to mild multifactorial spinal canal narrowing.  No cord impingement.  3. Uncovertebral spurring and facet arthrosis produce foraminal narrowing.  Limited evaluation suggests at least moderate to severe right and moderate left C3-C4, severe left/moderate right C4-C5, and moderate bilateral C5-C6 narrowing.    MRI lumbar spine 08/21/2023  FINDINGS:  In the interval  since the prior examination there appears to have been the development of compression deformity in the L5 vertebral body with approximately 25-50% vertebral body height loss.  The relative rapid development since the prior favors a posttraumatic process to a neoplastic process.  Please clinically correlate.  This appears to predominantly involve the superior endplate of the L5 vertebral body.  There does not appear to be cortical disruption to suggest an infectious osteomyelitis process which can sometimes appear similar.     The spinal cord appears to terminate at the L1-L2 level.     Collateral vessels are partially visualized in the left upper abdomen, these collateral vessels can be from multiple etiologies including splenorenal shunting associated with liver disease and or portal hypertension.  Please clinically correlate.     At the T11-T12 level, minimal disc bulge is noted without significant central canal stenosis or neural foraminal stenosis.  A similar appearance is noted on the prior.  At the T12-L1 level, no significant disc bulge, central canal stenosis, or neural foraminal stenosis is noted.  At the L1-L2 level, facet arthropathy and ligamentous hypertrophy is noted.  Broad-based bulging is noted towards the left neural foramen greater than right of 6 mm.  Mild right neural foraminal narrowing is noted.  Moderate left neural foraminal stenosis appears to be present with probable contact of the exiting nerve root on the left likely without significant change since the prior.  Facet arthropathy and ligamentous hypertrophy is noted.  Moderate thecal sac narrowing is noted with the thecal sac narrowed to 9 mm.  Very little fluid surrounds the cord at this level  At the L2-L3 level, facet arthropathy and ligamentous hypertrophy appears to be present.  Disc protrusion into the right lateral recess and neural foramen is noted of 5 mm.  Epidural fat is noted.  Bunching of the nerve roots is noted with very  little fluid surrounding the nerve roots and moderate thecal sac stenosis to 8 mm with a similar appearance on the prior.  Broad-based uncovertebral spurring is noted bilaterally with moderate to severe right neural foraminal stenosis and probable contact of the exiting nerve root.  Mild to moderate left neural foraminal stenosis is noted.  The exiting nerve root probably exit unimpinged.  A similar appearance is suspected from prior  At the L3-L4 level, disc protrusion appears to be present into the right and left lateral recesses extending 10 mm posterior to the disc plane.  Facet arthropathy and ligamentous hypertrophy is noted.  Some epidural fat is noted.  There is bunching of the nerve roots and severe thecal sac narrowing of 5-6 mm.  Moderate to severe right neural foraminal stenosis is noted with moderate left neural foraminal stenosis.  The exiting nerve root particularly on the right may be affected.  Mild progression of central canal narrowing may be present since the prior.  At the L4-L5 level, facet arthropathy and ligamentous hypertrophy appears to be present.  Mild broad-based bulge is noted.  Moderate bilateral neural foraminal stenosis is noted.  Contact of the exiting nerve roots may be present.  Some bunching of the nerve roots is noted with epidural fat.  Moderate to severe thecal sac narrowing is noted the 6 mm.  Moderate bilateral neural foraminal stenosis is noted with probable contact of the exiting nerve roots bilaterally.  The central canal narrowing at this level appears progressed since the prior  At the L5-S1 level, broad-based bulging is noted towards each neural foramen with mild to moderate neural foraminal narrowing similar to on the prior.  Minimal central canal narrowing is noted with thecal sac measuring approximately 10 mm unchanged since prior.       Labs:  BMP  Lab Results   Component Value Date     05/06/2024    K 4.9 05/06/2024     05/06/2024    CO2 25 05/06/2024     BUN 24 (H) 05/06/2024    CREATININE 0.8 05/06/2024    CALCIUM 8.8 05/06/2024    ANIONGAP 10 05/06/2024     Lab Results   Component Value Date    ALT 32 05/06/2024    AST 50 (H) 05/06/2024    ALKPHOS 215 (H) 05/06/2024    BILITOT 1.9 (H) 05/06/2024       Assessment:   Problem List Items Addressed This Visit    None  Visit Diagnoses       Lumbar radiculopathy    -  Primary    Lumbar spondylosis        Relevant Medications    meloxicam (MOBIC) 15 MG tablet    Spinal stenosis of lumbar region with neurogenic claudication        Chronic bilateral low back pain with bilateral sciatica        Thrombocytopenia        Relevant Orders    CBC Without Differential                  69 y.o. year old female presents to the office with back pain, constant, 6/10, radiating down the back of both legs.  + numbness, no weakness.    5/29/2024: Safia Ibarra returns to the office for follow up.  She is s/p bilateral L4/5 and L5/S1 RFA on 05/01/2024 with 50% relief.  Previous pain across her lower back has significantly improved.  She is able to stand and walk for longer periods of time than previously.  She is also reporting continued right lower back pain with radiation into right buttock and into right leg, 7/10, constant, worsened with physical activities.  She continues to take gabapentin and tramadol without significant relief of her pain.  No relief with Mobic.    - on exam she has in a wheelchair.  She uses this when it is available to her but otherwise can ambulate on her own.  She has full strength in his lower extremities and intact sensation to light touch bilateral L2-S1  - She is s/p bilateral L4/5 and L5/S1 RFA on 05/01/2024 with 50% relief.  - I independently reviewed her lumbar MRI, She has a right paracentral disc protrusion at L3-4 with severe central canal narrowing at L3-4.  At L4-5 she has severe central canal narrowing.  She has multilevel bilateral facet arthropathy  - she responded well to the lumbar RFA  in his found improvement in her previous lower back pain.  - I believe her continued pain is lumbar radicular pain and pain from her central canal narrowing.  This pain is limiting her mobility interfering with her ADLs and quality of life.  She has not finding significant relief with gabapentin, Mobic and tramadol.  - previously, we are unable to schedule her for lumbar epidural due to her chronic thrombocytopenia however recent labs showed platelets at 140 K.  - we will schedule her for repeat L4/5 NATASHA.  We will get labs prior to procedure.  Previous NATASHA gave her 50% relief lasting over 3 months.  - follow up 2 weeks post procedure or sooner if needed.  - refill for tramadol sent to Dr. Nieves today for approval.      :  Reviewed    The above note was completed, in part, with the aid of Dragon dictation software/hardware. Translation errors may be present.

## 2024-05-29 NOTE — H&P (VIEW-ONLY)
[facility] Follow Up Evaluation    Referred by: Dr. Singletary  Reason for referral: back pain    CC:   Chief Complaint   Patient presents with    Low-back Pain          5/29/2024     9:27 AM 4/17/2024    10:36 AM 3/12/2024    10:56 AM   Last 3 PDI Scores   Pain Disability Index (PDI) 20 26 22     Interval HPI 5/29/2024: Safia Ibarra returns to the office for follow up.  She is s/p bilateral L4/5 and L5/S1 RFA on 05/01/2024 with 50% relief.  Previous pain across her lower back has significantly improved.  She is able to stand and walk for longer periods of time than previously.  She is also reporting continued right lower back pain with radiation into right buttock and into right leg, 7/10, constant, worsened with physical activities.  She continues to take gabapentin and tramadol without significant relief of her pain.  No relief with Mobic.        Pain intervention history:  - s/p L5-S1 NATASHA on 1/3/22 with 80% relief   - s/p L5-S1 NATASHA on 9/21/22 with 70% relief lasting for over 5 months  - s/p L5/S1 NATASHA on 4/26/23 without relief  - s/p L4/5 NATASHA on 06/05/2023 with initially 80% relief.  - s/p L4/5 NATASHA on 09/05/2023 with greater than 50% relief  - s/p 1st bilateral L4/5 and L5/S1 diagnostic medial branch blocks on 03/5/2024 with 80% relief lasting the duration of the anesthetic.  - s/p 2nd bilateral L4/5 and L5/S1 diagnostic medial branch block on 04/10/2024 with 80% relief lasting the duration of the anesthetic.  - s/p bilateral L4/5 and L5/S1 RFA on 05/01/2024 with 50% relief.    HPI:   Safia Ibarra is a 69 y.o. female who complains of back pain    Onset: 6-7 months  Inciting Event: none  Progression: since onset, pain is gradually worsening  Current Pain Score: 6/10  Typical Range: 2-10/10  Timing: constant  Quality: shooting  Radiation: yes, down the back both legs  Associated numbness or weakness: yes numbness   Exacerbated by: walking  Allievated by: laying down  Is Pain Level Acceptable?:  No    Previous Therapies:  PT/OT: yes  HEP: yes  Interventions:   Surgery:  Medications:   - NSAIDS:   - MSK Relaxants:   - TCAs:   - SNRIs:   - Topicals:   - Anticonvulsants:  - Opioids:     History:    Current Outpatient Medications:     acetaminophen (TYLENOL) 500 MG tablet, Take 500 mg by mouth every 6 (six) hours as needed for Pain., Disp: , Rfl:     fesoterodine (TOVIAZ) 4 mg Tb24, Take 4 mg by mouth once daily., Disp: , Rfl:     gabapentin (NEURONTIN) 300 MG capsule, Take 300 mg by mouth 2 (two) times daily., Disp: , Rfl:     levothyroxine (SYNTHROID, LEVOTHROID) 175 MCG tablet, , Disp: , Rfl:     LIDOcaine (LIDODERM) 5 %, Place 1 patch onto the skin once daily., Disp: , Rfl:     losartan (COZAAR) 25 MG tablet, Take 0.5 tablets (12.5 mg total) by mouth every evening., Disp: 45 tablet, Rfl: 2    nystatin (MYCOSTATIN) cream, 3 (three) times daily. Apply to affected area, Disp: , Rfl:     ondansetron (ZOFRAN-ODT) 4 MG TbDL, 4 mg every 6 (six) hours as needed., Disp: , Rfl:     pantoprazole (PROTONIX) 40 MG tablet, Take 40 mg by mouth every morning., Disp: , Rfl:     rosuvastatin (CRESTOR) 10 MG tablet, Take 10 mg by mouth nightly., Disp: , Rfl:     meloxicam (MOBIC) 15 MG tablet, Take 1 tablet (15 mg total) by mouth daily as needed for Pain., Disp: 30 tablet, Rfl: 01    Past Medical History:   Diagnosis Date    HTN (hypertension)     Hyperlipidemia     Hypothyroidism     Osteoporosis        Past Surgical History:   Procedure Laterality Date    BREAST BIOPSY Left     core Dr. Hannah benign over 5 yrs ago    EPIDURAL STEROID INJECTION INTO LUMBAR SPINE N/A 1/3/2022    Procedure: Injection-steroid-epidural-lumbar L5/S1;  Surgeon: Yobani Nieves MD;  Location: [facility] OR;  Service: Pain Management;  Laterality: N/A;    EPIDURAL STEROID INJECTION INTO LUMBAR SPINE N/A 9/21/2022    Procedure: Injection-steroid-epidural-lumbar L5/S1;  Surgeon: Yobani Nieves MD;  Location: [facility] OR;  Service: Pain Management;   Laterality: N/A;    EPIDURAL STEROID INJECTION INTO LUMBAR SPINE N/A 4/26/2023    Procedure: Injection-steroid-epidural-lumbar L5/S1;  Surgeon: Yobani Nieves MD;  Location: [facility] OR;  Service: Pain Management;  Laterality: N/A;    EPIDURAL STEROID INJECTION INTO LUMBAR SPINE N/A 6/5/2023    Procedure: Injection-steroid-epidural-lumbar L4/L5;  Surgeon: Yobani Nieves MD;  Location: [facility] OR;  Service: Pain Management;  Laterality: N/A;    EPIDURAL STEROID INJECTION INTO LUMBAR SPINE N/A 9/5/2023    Procedure: Injection-steroid-epidural-lumbar L4/5;  Surgeon: Yobani Nieves MD;  Location: [facility] OR;  Service: Pain Management;  Laterality: N/A;  L4/5    HYSTERECTOMY      INJECTION OF ANESTHETIC AGENT AROUND MEDIAL BRANCH NERVES INNERVATING LUMBAR FACET JOINT Bilateral 3/5/2024    Procedure: Block-nerve-medial branch-lumbar    L4/5, L5/S1;  Surgeon: Yobani Nieves MD;  Location: [facility] OR;  Service: Pain Management;  Laterality: Bilateral;    INJECTION OF ANESTHETIC AGENT AROUND MEDIAL BRANCH NERVES INNERVATING LUMBAR FACET JOINT Bilateral 4/10/2024    Procedure: Block-nerve-medial branch-lumbar-L4/5&L5/S1;  Surgeon: Yobani Nieves MD;  Location: [facility] OR;  Service: Pain Management;  Laterality: Bilateral;    RADIOFREQUENCY ABLATION OF LUMBAR MEDIAL BRANCH NERVE AT SINGLE LEVEL Bilateral 5/1/2024    Procedure: Radiofrequency Ablation, Nerve, Spinal, Lumbar, Medial Branch, L4/5, L5/S1;  Surgeon: Yobani Nieves MD;  Location: [facility] OR;  Service: Pain Management;  Laterality: Bilateral;       Family History   Problem Relation Name Age of Onset    Uterine cancer Mother      Breast cancer Paternal Aunt         Social History     Socioeconomic History    Marital status:    Tobacco Use    Smoking status: Never    Smokeless tobacco: Never   Substance and Sexual Activity    Alcohol use: Never    Drug use: Never    Sexual activity: Not Currently     Social Determinants of Health     Financial Resource Strain: Medium  "Risk (4/17/2024)    Overall Financial Resource Strain (CARDIA)     Difficulty of Paying Living Expenses: Somewhat hard   Food Insecurity: No Food Insecurity (4/17/2024)    Hunger Vital Sign     Worried About Running Out of Food in the Last Year: Never true     Ran Out of Food in the Last Year: Never true   Transportation Needs: Unmet Transportation Needs (4/17/2024)    PRAPARE - Transportation     Lack of Transportation (Medical): Yes     Lack of Transportation (Non-Medical): Yes   Physical Activity: Unknown (4/17/2024)    Exercise Vital Sign     Days of Exercise per Week: 2 days   Stress: Stress Concern Present (4/17/2024)    [employer] [place] [employer] - Occupational Stress Questionnaire     Feeling of Stress : Very much   Housing Stability: High Risk (4/17/2024)    Housing Stability Vital Sign     Unable to Pay for Housing in the Last Year: Yes       Review of patient's allergies indicates:   Allergen Reactions    Bactrim [sulfamethoxazole-trimethoprim] Itching       Review of Systems:  General ROS: negative for - fever  Psychological ROS: negative for - hostility  Hematological and Lymphatic ROS: negative for - bleeding problems  Endocrine ROS: negative for - unexpected weight changes  Respiratory ROS: no cough, shortness of breath, or wheezing  Cardiovascular ROS: no chest pain or dyspnea on exertion  Gastrointestinal ROS: no abdominal pain, change in bowel habits, or black or bloody stools  Musculoskeletal ROS: negative for - muscular weakness  Neurological ROS: positive for - numbness/tingling  Dermatological ROS: negative for rash    Physical Exam:  Vitals:    05/29/24 0929   BP: (!) 158/67   Pulse: 64   Weight: 122 kg (268 lb 15.4 oz)   Height: 5' 1" (1.549 m)   PainSc:   7   PainLoc: Back         Body mass index is 50.82 kg/m².     Gen: NAD  Gait:  Presents in wheelchair.  Can ambulate on own.  Psych:  Mood appropriate for given condition  HEENT: eyes anicteric   GI: Abd soft  CV: RRR  Lungs: " breathing unlabored   ROM: limited AROM of the L spine in all planes, full ROM at ankles, knees and hips    Sensation: intact to light touch in all dermatomes tested from L2-S1 bilaterally  Reflexes:   Palpation: No tenderness to palpation over the midline lumbar spine  Tone: normal in the b/l knees and hips   Skin: intact  Extremities: No edema in b/l ankles or hands  Provacative tests:         Right Left   L2/3 Iliacus Hip flexion  5  5   L3/4 Qudratus Femoris Knee Extension  5  5   L4/5 Tib Anterior Ankle Dorsiflexion   5  5   L5/S1 Extensor Hallicus Longus Great toe extension  5  5                 S1/S2 Gastroc/Soleus Plantar Flexion  5  5       Imaging:  Xray lumbar spine 8/4/23  FINDINGS:  Stable mild superior endplate compression fracture deformity of L5 compared to 06/20/2023.  Diffuse bony demineralization without evidence of any new or progressive fractures.  Lower lumbar predominant facet arthrosis and trace anterolisthesis of L4 on L5 redemonstrated.  Degenerative changes most notable at the level of the visualized lower thoracic spine and disc height loss throughout the lumbar spine which is moderate at L3-L4 and L2-L3.  Broad levocurvature.    MRI thoracic spine 12/18/23  Impression:  1. Mild broad dextrocurvature of the thoracic spine.  Sagittal alignment is maintained noting mild exaggerated thoracic kyphosis.  2. Mild degenerative changes as discussed above without evidence of any significant spinal canal or foraminal narrowing.    MRI cervical spine 12/18/23  Impression:  1. Motion degraded exam limits evaluation.  No acute process.  2. Up to mild multifactorial spinal canal narrowing.  No cord impingement.  3. Uncovertebral spurring and facet arthrosis produce foraminal narrowing.  Limited evaluation suggests at least moderate to severe right and moderate left C3-C4, severe left/moderate right C4-C5, and moderate bilateral C5-C6 narrowing.    MRI lumbar spine 08/21/2023  FINDINGS:  In the interval  since the prior examination there appears to have been the development of compression deformity in the L5 vertebral body with approximately 25-50% vertebral body height loss.  The relative rapid development since the prior favors a posttraumatic process to a neoplastic process.  Please clinically correlate.  This appears to predominantly involve the superior endplate of the L5 vertebral body.  There does not appear to be cortical disruption to suggest an infectious osteomyelitis process which can sometimes appear similar.     The spinal cord appears to terminate at the L1-L2 level.     Collateral vessels are partially visualized in the left upper abdomen, these collateral vessels can be from multiple etiologies including splenorenal shunting associated with liver disease and or portal hypertension.  Please clinically correlate.     At the T11-T12 level, minimal disc bulge is noted without significant central canal stenosis or neural foraminal stenosis.  A similar appearance is noted on the prior.  At the T12-L1 level, no significant disc bulge, central canal stenosis, or neural foraminal stenosis is noted.  At the L1-L2 level, facet arthropathy and ligamentous hypertrophy is noted.  Broad-based bulging is noted towards the left neural foramen greater than right of 6 mm.  Mild right neural foraminal narrowing is noted.  Moderate left neural foraminal stenosis appears to be present with probable contact of the exiting nerve root on the left likely without significant change since the prior.  Facet arthropathy and ligamentous hypertrophy is noted.  Moderate thecal sac narrowing is noted with the thecal sac narrowed to 9 mm.  Very little fluid surrounds the cord at this level  At the L2-L3 level, facet arthropathy and ligamentous hypertrophy appears to be present.  Disc protrusion into the right lateral recess and neural foramen is noted of 5 mm.  Epidural fat is noted.  Bunching of the nerve roots is noted with very  little fluid surrounding the nerve roots and moderate thecal sac stenosis to 8 mm with a similar appearance on the prior.  Broad-based uncovertebral spurring is noted bilaterally with moderate to severe right neural foraminal stenosis and probable contact of the exiting nerve root.  Mild to moderate left neural foraminal stenosis is noted.  The exiting nerve root probably exit unimpinged.  A similar appearance is suspected from prior  At the L3-L4 level, disc protrusion appears to be present into the right and left lateral recesses extending 10 mm posterior to the disc plane.  Facet arthropathy and ligamentous hypertrophy is noted.  Some epidural fat is noted.  There is bunching of the nerve roots and severe thecal sac narrowing of 5-6 mm.  Moderate to severe right neural foraminal stenosis is noted with moderate left neural foraminal stenosis.  The exiting nerve root particularly on the right may be affected.  Mild progression of central canal narrowing may be present since the prior.  At the L4-L5 level, facet arthropathy and ligamentous hypertrophy appears to be present.  Mild broad-based bulge is noted.  Moderate bilateral neural foraminal stenosis is noted.  Contact of the exiting nerve roots may be present.  Some bunching of the nerve roots is noted with epidural fat.  Moderate to severe thecal sac narrowing is noted the 6 mm.  Moderate bilateral neural foraminal stenosis is noted with probable contact of the exiting nerve roots bilaterally.  The central canal narrowing at this level appears progressed since the prior  At the L5-S1 level, broad-based bulging is noted towards each neural foramen with mild to moderate neural foraminal narrowing similar to on the prior.  Minimal central canal narrowing is noted with thecal sac measuring approximately 10 mm unchanged since prior.       Labs:  BMP  Lab Results   Component Value Date     05/06/2024    K 4.9 05/06/2024     05/06/2024    CO2 25 05/06/2024     BUN 24 (H) 05/06/2024    CREATININE 0.8 05/06/2024    CALCIUM 8.8 05/06/2024    ANIONGAP 10 05/06/2024     Lab Results   Component Value Date    ALT 32 05/06/2024    AST 50 (H) 05/06/2024    ALKPHOS 215 (H) 05/06/2024    BILITOT 1.9 (H) 05/06/2024       Assessment:   Problem List Items Addressed This Visit    None  Visit Diagnoses       Lumbar radiculopathy    -  Primary    Lumbar spondylosis        Relevant Medications    meloxicam (MOBIC) 15 MG tablet    Spinal stenosis of lumbar region with neurogenic claudication        Chronic bilateral low back pain with bilateral sciatica        Thrombocytopenia        Relevant Orders    CBC Without Differential                  69 y.o. year old female presents to the office with back pain, constant, 6/10, radiating down the back of both legs.  + numbness, no weakness.    5/29/2024: Safia Ibarra returns to the office for follow up.  She is s/p bilateral L4/5 and L5/S1 RFA on 05/01/2024 with 50% relief.  Previous pain across her lower back has significantly improved.  She is able to stand and walk for longer periods of time than previously.  She is also reporting continued right lower back pain with radiation into right buttock and into right leg, 7/10, constant, worsened with physical activities.  She continues to take gabapentin and tramadol without significant relief of her pain.  No relief with Mobic.    - on exam she has in a wheelchair.  She uses this when it is available to her but otherwise can ambulate on her own.  She has full strength in his lower extremities and intact sensation to light touch bilateral L2-S1  - She is s/p bilateral L4/5 and L5/S1 RFA on 05/01/2024 with 50% relief.  - I independently reviewed her lumbar MRI, She has a right paracentral disc protrusion at L3-4 with severe central canal narrowing at L3-4.  At L4-5 she has severe central canal narrowing.  She has multilevel bilateral facet arthropathy  - she responded well to the lumbar RFA  in his found improvement in her previous lower back pain.  - I believe her continued pain is lumbar radicular pain and pain from her central canal narrowing.  This pain is limiting her mobility interfering with her ADLs and quality of life.  She has not finding significant relief with gabapentin, Mobic and tramadol.  - previously, we are unable to schedule her for lumbar epidural due to her chronic thrombocytopenia however recent labs showed platelets at 140 K.  - we will schedule her for repeat L4/5 NATASHA.  We will get labs prior to procedure.  Previous NATASHA gave her 50% relief lasting over 3 months.  - follow up 2 weeks post procedure or sooner if needed.  - refill for tramadol sent to Dr. Nieves today for approval.      :  Reviewed    The above note was completed, in part, with the aid of Dragon dictation software/hardware. Translation errors may be present.

## 2024-05-30 DIAGNOSIS — M54.16 LUMBAR RADICULOPATHY: Primary | ICD-10-CM

## 2024-05-30 RX ORDER — ALPRAZOLAM 1 MG/1
1 TABLET, ORALLY DISINTEGRATING ORAL ONCE AS NEEDED
Status: CANCELLED | OUTPATIENT
Start: 2024-05-30 | End: 2035-10-27

## 2024-06-04 ENCOUNTER — TELEPHONE (OUTPATIENT)
Dept: GASTROENTEROLOGY | Facility: CLINIC | Age: 69
End: 2024-06-04
Payer: MEDICARE

## 2024-06-05 ENCOUNTER — TELEPHONE (OUTPATIENT)
Dept: SURGERY | Facility: HOSPITAL | Age: 69
End: 2024-06-05
Payer: MEDICARE

## 2024-06-05 RX ORDER — TORSEMIDE 20 MG/1
TABLET ORAL
COMMUNITY
Start: 2024-04-23

## 2024-06-05 NOTE — TELEPHONE ENCOUNTER
Patient is scheduled for LESI 6/12. Her BMI is 50.64. Please advise if this will interfere with scheduled procedure. Thank you.

## 2024-06-06 ENCOUNTER — LAB VISIT (OUTPATIENT)
Dept: PRIMARY CARE CLINIC | Facility: CLINIC | Age: 69
End: 2024-06-06
Payer: COMMERCIAL

## 2024-06-06 DIAGNOSIS — D69.6 THROMBOCYTOPENIA: ICD-10-CM

## 2024-06-06 LAB
ERYTHROCYTE [DISTWIDTH] IN BLOOD BY AUTOMATED COUNT: 16.6 % (ref 11.5–14.5)
HCT VFR BLD AUTO: 35.4 % (ref 37–48.5)
HGB BLD-MCNC: 10.7 G/DL (ref 12–16)
MCH RBC QN AUTO: 28.1 PG (ref 27–31)
MCHC RBC AUTO-ENTMCNC: 30.2 G/DL (ref 32–36)
MCV RBC AUTO: 93 FL (ref 82–98)
PLATELET # BLD AUTO: 108 K/UL (ref 150–450)
PMV BLD AUTO: 11.1 FL (ref 9.2–12.9)
RBC # BLD AUTO: 3.81 M/UL (ref 4–5.4)
WBC # BLD AUTO: 5.19 K/UL (ref 3.9–12.7)

## 2024-06-06 PROCEDURE — 85027 COMPLETE CBC AUTOMATED: CPT

## 2024-06-06 PROCEDURE — 36415 COLL VENOUS BLD VENIPUNCTURE: CPT | Mod: S$GLB,,,

## 2024-06-12 ENCOUNTER — HOSPITAL ENCOUNTER (OUTPATIENT)
Facility: HOSPITAL | Age: 69
Discharge: HOME | End: 2024-06-12
Attending: ANESTHESIOLOGY | Admitting: ANESTHESIOLOGY
Payer: COMMERCIAL

## 2024-06-12 ENCOUNTER — HOSPITAL ENCOUNTER (OUTPATIENT)
Dept: RADIOLOGY | Facility: HOSPITAL | Age: 69
Discharge: HOME | End: 2024-06-12
Attending: ANESTHESIOLOGY
Payer: MEDICARE

## 2024-06-12 VITALS
DIASTOLIC BLOOD PRESSURE: 77 MMHG | RESPIRATION RATE: 18 BRPM | SYSTOLIC BLOOD PRESSURE: 164 MMHG | TEMPERATURE: 97 F | WEIGHT: 268 LBS | BODY MASS INDEX: 50.6 KG/M2 | OXYGEN SATURATION: 99 % | HEIGHT: 61 IN | HEART RATE: 84 BPM

## 2024-06-12 DIAGNOSIS — M54.16 LUMBAR RADICULOPATHY: Primary | ICD-10-CM

## 2024-06-12 DIAGNOSIS — M54.50 LOWER BACK PAIN: ICD-10-CM

## 2024-06-12 PROCEDURE — 76000 FLUOROSCOPY <1 HR PHYS/QHP: CPT | Mod: TC,PO

## 2024-06-12 PROCEDURE — A4216 STERILE WATER/SALINE, 10 ML: HCPCS | Mod: PO | Performed by: ANESTHESIOLOGY

## 2024-06-12 PROCEDURE — 25000003 PHARM REV CODE 250: Mod: PO | Performed by: ANESTHESIOLOGY

## 2024-06-12 PROCEDURE — 63600175 PHARM REV CODE 636 W HCPCS: Mod: PO | Performed by: ANESTHESIOLOGY

## 2024-06-12 PROCEDURE — 62323 NJX INTERLAMINAR LMBR/SAC: CPT | Mod: ,,, | Performed by: ANESTHESIOLOGY

## 2024-06-12 PROCEDURE — 62323 NJX INTERLAMINAR LMBR/SAC: CPT | Mod: PO | Performed by: ANESTHESIOLOGY

## 2024-06-12 PROCEDURE — 25500020 PHARM REV CODE 255: Mod: PO | Performed by: ANESTHESIOLOGY

## 2024-06-12 RX ORDER — SODIUM CHLORIDE 9 MG/ML
INJECTION, SOLUTION INTRAMUSCULAR; INTRAVENOUS; SUBCUTANEOUS
Status: DISCONTINUED | OUTPATIENT
Start: 2024-06-12 | End: 2024-06-12 | Stop reason: HOSPADM

## 2024-06-12 RX ORDER — METHYLPREDNISOLONE ACETATE 80 MG/ML
INJECTION, SUSPENSION INTRA-ARTICULAR; INTRALESIONAL; INTRAMUSCULAR; SOFT TISSUE
Status: DISCONTINUED | OUTPATIENT
Start: 2024-06-12 | End: 2024-06-12 | Stop reason: HOSPADM

## 2024-06-12 RX ORDER — LIDOCAINE HYDROCHLORIDE 10 MG/ML
INJECTION, SOLUTION EPIDURAL; INFILTRATION; INTRACAUDAL; PERINEURAL
Status: DISCONTINUED | OUTPATIENT
Start: 2024-06-12 | End: 2024-06-12 | Stop reason: HOSPADM

## 2024-06-12 RX ORDER — ALPRAZOLAM 0.5 MG/1
1 TABLET, ORALLY DISINTEGRATING ORAL ONCE AS NEEDED
Status: DISCONTINUED | OUTPATIENT
Start: 2024-06-12 | End: 2024-06-12 | Stop reason: HOSPADM

## 2024-06-12 NOTE — DISCHARGE SUMMARY
[facility]  Discharge Note  Short Stay    Admit Date: 6/12/2024    Discharge Date: 6/12/2024    Attending Physician: Yobani Nieves     Discharge Provider: Yobani Nieves    Diagnoses:  There are no hospital problems to display for this patient.      Discharged Condition: Good    Final Diagnoses: Lumbar radiculopathy [M54.16]    Disposition: Home or Self Care    Hospital Course: No complications, uneventful    Outcome of Hospitalization, Treatment, Procedure, or Surgery:  Patient was admitted for outpatient interventional pain management procedure. The patient tolerated the procedure well with no complications.    Follow up/Patient Instructions:  Follow up as scheduled in Pain Management office in 2-3 weeks.  Patient has received instructions and follow up date and time.    Medications:  Continue previous medications    Discharge Procedure Orders   Notify your health care provider if you experience any of the following:  temperature >100.4     Notify your health care provider if you experience any of the following:  persistent nausea and vomiting or diarrhea     Notify your health care provider if you experience any of the following:  severe uncontrolled pain     Notify your health care provider if you experience any of the following:  redness, tenderness, or signs of infection (pain, swelling, redness, odor or green/yellow discharge around incision site)     Notify your health care provider if you experience any of the following:  difficulty breathing or increased cough     Notify your health care provider if you experience any of the following:  severe persistent headache     Notify your health care provider if you experience any of the following:  worsening rash     Notify your health care provider if you experience any of the following:  persistent dizziness, light-headedness, or visual disturbances     Notify your health care provider if you experience any of the following:  increased confusion or  weakness     Activity as tolerated

## 2024-06-12 NOTE — INTERVAL H&P NOTE
The patient has been examined and the H&P has been reviewed:    I concur with the findings and no changes have occurred since H&P was written.  CBC and INR reviewed and wnl.  We will proceed with L4-5 NATASHA.  Thoroughly discussed risks including bleeding/hematoma and discussed s/s to look out for and if develops any to present to the ER. The risks and benefits of this intervention, and alternative therapies were discussed with the patient.  The discussion of risks included infection, bleeding, need for additional procedures or surgery, nerve damage.  Questions regarding the procedure, risks, expected outcome, and possible side effects were solicited and answered to the patient's satisfaction.  Safia Keen wishes to proceed with the injection or procedure.  Written consent was obtained.        There are no hospital problems to display for this patient.

## 2024-06-12 NOTE — OP NOTE
"Procedure Note    Procedure Date: 6/12/2024    Procedure Performed:  L4/5 lumbar interlaminar epidural steroid injection under fluoroscopy.    Indications:  Safia Ibarra presents with lumbar radiculitis/radiculopathy secondary to disc herniation, osteophyte/osteophyte complexes, and/or severe degenerative disc disease producing foraminal or central spinal stenosis.  The pain has been present for at least 4 weeks and the patient has failed to respond to noninvasive conservative care.  Pain rated by NRS at baseline prior to intervention is 6/10.  Their radiculitis/radiculopathy and/or neurogenic claudication is severe enough to greatly impact their quality of life or function.     Pre-op diagnosis: Lumbar Radiculopathy    Post-op diagnosis: same    Physician: Yobani Neives MD    IV anxiolysis medications: none    Medications injected: depomedrol 80mg, 1% Lidocaine 1ml, 2 mL sterile, preservative-free normal saline.    Local anesthetic used: 1% Lidocaine, 1 ml    Estimated Blood Loss: none    Complications:  none    Technique:  The patient was interviewed in the holding area and Risks/Benefits were discussed, including, but not limited to, the possibility of new or different pain, bleeding or infection.   All questions were answered.  The patient understood and accepted risks.  Consent was verfied.  A time-out was taken to identify patient and procedure prior to starting the procedure. The patient was placed in the prone position on the fluoroscopy table. The area of the lumbar spine was prepped with Chloraprep x2 and draped in a sterile manner. The L4/5 interspace was identified and marked under AP fluoroscopy. The skin and subcutaneous tissues overlying the targeted interspace were anesthetized with 3-5 mL of 1% lidocaine using a 25G, 1.5" needle.  A 20G, 3.5" Tuohy epidural needle was directed toward the interspace under fluoroscopic guidance until the ligamentum flavum was engaged. From this point, a loss of " resistance technique with a glass syringe and saline was used to identify entrance of the needle into the epidural space. Once loss of resistance was observed 1 mL of contrast solution was injected. An appropriate epidurogram was noted.  A 4 mL mixture consisting of saline, 1 mL 1% Lidocaine and 80 mg of depomedrol was injected slowly and without resistance.  The needle was flushed with normal saline and removed. The contrast was seen to be displaced after injection. Patient was awake/responsive during all injections.  The patient tolerated the procedure well and was transferred to the [facility] in stable condition.  The patient was monitored after the procedure and was given post-procedure and discharge instructions to follow at home. The patient was discharged in a stable condition.

## 2024-06-26 ENCOUNTER — OFFICE VISIT (OUTPATIENT)
Dept: PAIN MEDICINE | Facility: CLINIC | Age: 69
End: 2024-06-26
Payer: COMMERCIAL

## 2024-06-26 ENCOUNTER — TELEPHONE (OUTPATIENT)
Dept: PAIN MEDICINE | Facility: CLINIC | Age: 69
End: 2024-06-26

## 2024-06-26 VITALS
HEIGHT: 61 IN | BODY MASS INDEX: 50.61 KG/M2 | DIASTOLIC BLOOD PRESSURE: 73 MMHG | SYSTOLIC BLOOD PRESSURE: 144 MMHG | HEART RATE: 77 BPM | WEIGHT: 268.06 LBS

## 2024-06-26 DIAGNOSIS — M54.16 LUMBAR RADICULOPATHY: Primary | ICD-10-CM

## 2024-06-26 DIAGNOSIS — M48.062 SPINAL STENOSIS OF LUMBAR REGION WITH NEUROGENIC CLAUDICATION: ICD-10-CM

## 2024-06-26 DIAGNOSIS — M47.816 LUMBAR SPONDYLOSIS: ICD-10-CM

## 2024-06-26 DIAGNOSIS — D69.6 THROMBOCYTOPENIA: ICD-10-CM

## 2024-06-26 DIAGNOSIS — M51.36 DDD (DEGENERATIVE DISC DISEASE), LUMBAR: ICD-10-CM

## 2024-06-26 PROCEDURE — 3008F BODY MASS INDEX DOCD: CPT | Mod: CPTII,S$GLB,,

## 2024-06-26 PROCEDURE — 3078F DIAST BP <80 MM HG: CPT | Mod: CPTII,S$GLB,,

## 2024-06-26 PROCEDURE — 1125F AMNT PAIN NOTED PAIN PRSNT: CPT | Mod: CPTII,S$GLB,,

## 2024-06-26 PROCEDURE — 3288F FALL RISK ASSESSMENT DOCD: CPT | Mod: CPTII,S$GLB,,

## 2024-06-26 PROCEDURE — 1101F PT FALLS ASSESS-DOCD LE1/YR: CPT | Mod: CPTII,S$GLB,,

## 2024-06-26 PROCEDURE — 99214 OFFICE O/P EST MOD 30 MIN: CPT | Mod: S$GLB,,,

## 2024-06-26 PROCEDURE — 3077F SYST BP >= 140 MM HG: CPT | Mod: CPTII,S$GLB,,

## 2024-06-26 PROCEDURE — 1159F MED LIST DOCD IN RCRD: CPT | Mod: CPTII,S$GLB,,

## 2024-06-26 PROCEDURE — 99999 PR PBB SHADOW E&M-EST. PATIENT-LVL III: CPT | Mod: PBBFAC,,,

## 2024-06-26 PROCEDURE — 4010F ACE/ARB THERAPY RXD/TAKEN: CPT | Mod: CPTII,S$GLB,,

## 2024-06-26 NOTE — TELEPHONE ENCOUNTER
Please schedule patient for the following procedure:    Physician - Dr Nieves    Type of Procedure/Injection - Lumbar Epidural  L5/S1           Laterality - NA      Anxiolysis- Local      Need to hold medication - Yes      NSAIDs for 2 days      Clearance needed - No      Follow up - 2 week

## 2024-06-26 NOTE — PROGRESS NOTES
[facility] Follow Up Evaluation    Referred by: Dr. Singletary  Reason for referral: back pain    CC:   Chief Complaint   Patient presents with    Low-back Pain          6/26/2024     9:40 AM 5/29/2024     9:27 AM 4/17/2024    10:36 AM   Last 3 PDI Scores   Pain Disability Index (PDI) 25 20 26     Interval HPI 6/26/2024: Safia Ibarra returns to the office for follow up. She is s/p L4/5 NATASHA on 06/12/2024 with 10% relief.  Today she is reporting continued pain radiating down her right leg into right foot, 7/10, worsened with standing and walking.  She denies any associated numbness, weakness or any new changes to her bowel bladder function.  She does not feel like she can walk any extended distances due to worsening pain.  She continues to take gabapentin and tramadol with minimal relief.      Pain intervention history:  - s/p L5-S1 NATASHA on 1/3/22 with 80% relief   - s/p L5-S1 NATASHA on 9/21/22 with 70% relief lasting for over 5 months  - s/p L5/S1 NATASHA on 4/26/23 without relief  - s/p L4/5 NATASHA on 06/05/2023 with initially 80% relief.  - s/p L4/5 NATASHA on 09/05/2023 with greater than 50% relief  - s/p 1st bilateral L4/5 and L5/S1 diagnostic medial branch blocks on 03/5/2024 with 80% relief lasting the duration of the anesthetic.  - s/p 2nd bilateral L4/5 and L5/S1 diagnostic medial branch block on 04/10/2024 with 80% relief lasting the duration of the anesthetic.  - s/p bilateral L4/5 and L5/S1 RFA on 05/01/2024 with 50% relief.  - s/p L4/5 NATASHA on 06/12/2024 with 10% relief.     HPI:   Safia Ibarra is a 69 y.o. female who complains of back pain    Onset: 6-7 months  Inciting Event: none  Progression: since onset, pain is gradually worsening  Current Pain Score: 6/10  Typical Range: 2-10/10  Timing: constant  Quality: shooting  Radiation: yes, down the back both legs  Associated numbness or weakness: yes numbness   Exacerbated by: walking  Allievated by: laying down  Is Pain Level Acceptable?:  No    Previous Therapies:  PT/OT: yes  HEP: yes  Interventions:   Surgery:  Medications:   - NSAIDS:   - MSK Relaxants:   - TCAs:   - SNRIs:   - Topicals:   - Anticonvulsants:  - Opioids:     History:    Current Outpatient Medications:     acetaminophen (TYLENOL) 500 MG tablet, Take 500 mg by mouth every 6 (six) hours as needed for Pain., Disp: , Rfl:     fesoterodine (TOVIAZ) 4 mg Tb24, Take 4 mg by mouth once daily., Disp: , Rfl:     gabapentin (NEURONTIN) 300 MG capsule, Take 300 mg by mouth 2 (two) times daily., Disp: , Rfl:     levothyroxine (SYNTHROID, LEVOTHROID) 175 MCG tablet, , Disp: , Rfl:     LIDOcaine (LIDODERM) 5 %, Place 1 patch onto the skin once daily., Disp: , Rfl:     losartan (COZAAR) 25 MG tablet, Take 0.5 tablets (12.5 mg total) by mouth every evening., Disp: 45 tablet, Rfl: 2    meloxicam (MOBIC) 15 MG tablet, Take 1 tablet (15 mg total) by mouth daily as needed for Pain., Disp: 30 tablet, Rfl: 01    nystatin (MYCOSTATIN) cream, 3 (three) times daily. Apply to affected area, Disp: , Rfl:     ondansetron (ZOFRAN-ODT) 4 MG TbDL, 4 mg every 6 (six) hours as needed., Disp: , Rfl:     pantoprazole (PROTONIX) 40 MG tablet, Take 40 mg by mouth every morning., Disp: , Rfl:     rosuvastatin (CRESTOR) 10 MG tablet, Take 10 mg by mouth nightly., Disp: , Rfl:     torsemide (DEMADEX) 20 MG Tab, TAKE 1 TABLET BY MOUTH ONCE DAILY AS NEEDED SWELLING, Disp: , Rfl:     Past Medical History:   Diagnosis Date    HTN (hypertension)     Hyperlipidemia     Hypothyroidism     Osteoporosis        Past Surgical History:   Procedure Laterality Date    ARTHROPLASTY OF BOTH KNEES Bilateral     BREAST BIOPSY Left     core Dr. Hannah benign over 5 yrs ago    EPIDURAL STEROID INJECTION INTO LUMBAR SPINE N/A 01/03/2022    Procedure: Injection-steroid-epidural-lumbar L5/S1;  Surgeon: Yobani Nieves MD;  Location: [facility];  Service: Pain Management;  Laterality: N/A;    EPIDURAL STEROID INJECTION INTO LUMBAR SPINE N/A  09/21/2022    Procedure: Injection-steroid-epidural-lumbar L5/S1;  Surgeon: Yobani Nieves MD;  Location: [facility] OR;  Service: Pain Management;  Laterality: N/A;    EPIDURAL STEROID INJECTION INTO LUMBAR SPINE N/A 04/26/2023    Procedure: Injection-steroid-epidural-lumbar L5/S1;  Surgeon: Yobani Nieves MD;  Location: [facility] OR;  Service: Pain Management;  Laterality: N/A;    EPIDURAL STEROID INJECTION INTO LUMBAR SPINE N/A 06/05/2023    Procedure: Injection-steroid-epidural-lumbar L4/L5;  Surgeon: Yobani Nieves MD;  Location: [facility] OR;  Service: Pain Management;  Laterality: N/A;    EPIDURAL STEROID INJECTION INTO LUMBAR SPINE N/A 09/05/2023    Procedure: Injection-steroid-epidural-lumbar L4/5;  Surgeon: Yobani Nieves MD;  Location: [facility] OR;  Service: Pain Management;  Laterality: N/A;  L4/5    EPIDURAL STEROID INJECTION INTO LUMBAR SPINE N/A 6/12/2024    Procedure: Injection-steroid-epidural-lumbar   L4/5;  Surgeon: Yobani Nieves MD;  Location: [facility] OR;  Service: Pain Management;  Laterality: N/A;    HYSTERECTOMY      INJECTION OF ANESTHETIC AGENT AROUND MEDIAL BRANCH NERVES INNERVATING LUMBAR FACET JOINT Bilateral 03/05/2024    Procedure: Block-nerve-medial branch-lumbar    L4/5, L5/S1;  Surgeon: Yobani Nieves MD;  Location: [facility] OR;  Service: Pain Management;  Laterality: Bilateral;    INJECTION OF ANESTHETIC AGENT AROUND MEDIAL BRANCH NERVES INNERVATING LUMBAR FACET JOINT Bilateral 04/10/2024    Procedure: Block-nerve-medial branch-lumbar-L4/5&L5/S1;  Surgeon: Yobani Nieves MD;  Location: [facility] OR;  Service: Pain Management;  Laterality: Bilateral;    RADIOFREQUENCY ABLATION OF LUMBAR MEDIAL BRANCH NERVE AT SINGLE LEVEL Bilateral 05/01/2024    Procedure: Radiofrequency Ablation, Nerve, Spinal, Lumbar, Medial Branch, L4/5, L5/S1;  Surgeon: Yobani Nieves MD;  Location: [facility] OR;  Service: Pain Management;  Laterality: Bilateral;       Family History   Problem Relation Name Age of Onset    Uterine  cancer Mother      Breast cancer Paternal Aunt         Social History     Socioeconomic History    Marital status:    Tobacco Use    Smoking status: Never    Smokeless tobacco: Never   Substance and Sexual Activity    Alcohol use: Never    Drug use: Never    Sexual activity: Not Currently     Social Determinants of Health     Financial Resource Strain: Medium Risk (4/17/2024)    Overall Financial Resource Strain (CARDIA)     Difficulty of Paying Living Expenses: Somewhat hard   Food Insecurity: No Food Insecurity (4/17/2024)    Hunger Vital Sign     Worried About Running Out of Food in the Last Year: Never true     Ran Out of Food in the Last Year: Never true   Transportation Needs: Unmet Transportation Needs (4/17/2024)    PRAPARE - Transportation     Lack of Transportation (Medical): Yes     Lack of Transportation (Non-Medical): Yes   Physical Activity: Unknown (4/17/2024)    Exercise Vital Sign     Days of Exercise per Week: 2 days   Stress: Stress Concern Present (4/17/2024)    [employer] [place] [employer] - Occupational Stress Questionnaire     Feeling of Stress : Very much   Housing Stability: High Risk (4/17/2024)    Housing Stability Vital Sign     Unable to Pay for Housing in the Last Year: Yes       Review of patient's allergies indicates:   Allergen Reactions    Bactrim [sulfamethoxazole-trimethoprim] Itching       Review of Systems:  General ROS: negative for - fever  Psychological ROS: negative for - hostility  Hematological and Lymphatic ROS: negative for - bleeding problems  Endocrine ROS: negative for - unexpected weight changes  Respiratory ROS: no cough, shortness of breath, or wheezing  Cardiovascular ROS: no chest pain or dyspnea on exertion  Gastrointestinal ROS: no abdominal pain, change in bowel habits, or black or bloody stools  Musculoskeletal ROS: negative for - muscular weakness  Neurological ROS: positive for - numbness/tingling  Dermatological ROS: negative for  "rash    Physical Exam:  Vitals:    06/26/24 0942   BP: (!) 144/73   Pulse: 77   Weight: 121.6 kg (268 lb 1.3 oz)   Height: 5' 1" (1.549 m)   PainSc:   7   PainLoc: Back           Body mass index is 50.65 kg/m².     Gen: NAD  Gait:  Presents in wheelchair.  Can ambulate on own.  Psych:  Mood appropriate for given condition  HEENT: eyes anicteric   GI: Abd soft  CV: RRR  Lungs: breathing unlabored   ROM: limited AROM of the L spine in all planes, full ROM at ankles, knees and hips    Sensation: intact to light touch in all dermatomes tested from L2-S1 bilaterally  Reflexes:   Palpation: No tenderness to palpation over the midline lumbar spine  Tone: normal in the b/l knees and hips   Skin: intact  Extremities: No edema in b/l ankles or hands  Provacative tests:         Right Left   L2/3 Iliacus Hip flexion  5  5   L3/4 Qudratus Femoris Knee Extension  5  5   L4/5 Tib Anterior Ankle Dorsiflexion   5  5   L5/S1 Extensor Hallicus Longus Great toe extension  5  5                 S1/S2 Gastroc/Soleus Plantar Flexion  5  5       Imaging:  Xray lumbar spine 8/4/23  FINDINGS:  Stable mild superior endplate compression fracture deformity of L5 compared to 06/20/2023.  Diffuse bony demineralization without evidence of any new or progressive fractures.  Lower lumbar predominant facet arthrosis and trace anterolisthesis of L4 on L5 redemonstrated.  Degenerative changes most notable at the level of the visualized lower thoracic spine and disc height loss throughout the lumbar spine which is moderate at L3-L4 and L2-L3.  Broad levocurvature.    MRI thoracic spine 12/18/23  Impression:  1. Mild broad dextrocurvature of the thoracic spine.  Sagittal alignment is maintained noting mild exaggerated thoracic kyphosis.  2. Mild degenerative changes as discussed above without evidence of any significant spinal canal or foraminal narrowing.    MRI cervical spine 12/18/23  Impression:  1. Motion degraded exam limits evaluation.  No acute " process.  2. Up to mild multifactorial spinal canal narrowing.  No cord impingement.  3. Uncovertebral spurring and facet arthrosis produce foraminal narrowing.  Limited evaluation suggests at least moderate to severe right and moderate left C3-C4, severe left/moderate right C4-C5, and moderate bilateral C5-C6 narrowing.    MRI lumbar spine 08/21/2023  FINDINGS:  In the interval since the prior examination there appears to have been the development of compression deformity in the L5 vertebral body with approximately 25-50% vertebral body height loss.  The relative rapid development since the prior favors a posttraumatic process to a neoplastic process.  Please clinically correlate.  This appears to predominantly involve the superior endplate of the L5 vertebral body.  There does not appear to be cortical disruption to suggest an infectious osteomyelitis process which can sometimes appear similar.     The spinal cord appears to terminate at the L1-L2 level.     Collateral vessels are partially visualized in the left upper abdomen, these collateral vessels can be from multiple etiologies including splenorenal shunting associated with liver disease and or portal hypertension.  Please clinically correlate.     At the T11-T12 level, minimal disc bulge is noted without significant central canal stenosis or neural foraminal stenosis.  A similar appearance is noted on the prior.  At the T12-L1 level, no significant disc bulge, central canal stenosis, or neural foraminal stenosis is noted.  At the L1-L2 level, facet arthropathy and ligamentous hypertrophy is noted.  Broad-based bulging is noted towards the left neural foramen greater than right of 6 mm.  Mild right neural foraminal narrowing is noted.  Moderate left neural foraminal stenosis appears to be present with probable contact of the exiting nerve root on the left likely without significant change since the prior.  Facet arthropathy and ligamentous hypertrophy is  noted.  Moderate thecal sac narrowing is noted with the thecal sac narrowed to 9 mm.  Very little fluid surrounds the cord at this level  At the L2-L3 level, facet arthropathy and ligamentous hypertrophy appears to be present.  Disc protrusion into the right lateral recess and neural foramen is noted of 5 mm.  Epidural fat is noted.  Bunching of the nerve roots is noted with very little fluid surrounding the nerve roots and moderate thecal sac stenosis to 8 mm with a similar appearance on the prior.  Broad-based uncovertebral spurring is noted bilaterally with moderate to severe right neural foraminal stenosis and probable contact of the exiting nerve root.  Mild to moderate left neural foraminal stenosis is noted.  The exiting nerve root probably exit unimpinged.  A similar appearance is suspected from prior  At the L3-L4 level, disc protrusion appears to be present into the right and left lateral recesses extending 10 mm posterior to the disc plane.  Facet arthropathy and ligamentous hypertrophy is noted.  Some epidural fat is noted.  There is bunching of the nerve roots and severe thecal sac narrowing of 5-6 mm.  Moderate to severe right neural foraminal stenosis is noted with moderate left neural foraminal stenosis.  The exiting nerve root particularly on the right may be affected.  Mild progression of central canal narrowing may be present since the prior.  At the L4-L5 level, facet arthropathy and ligamentous hypertrophy appears to be present.  Mild broad-based bulge is noted.  Moderate bilateral neural foraminal stenosis is noted.  Contact of the exiting nerve roots may be present.  Some bunching of the nerve roots is noted with epidural fat.  Moderate to severe thecal sac narrowing is noted the 6 mm.  Moderate bilateral neural foraminal stenosis is noted with probable contact of the exiting nerve roots bilaterally.  The central canal narrowing at this level appears progressed since the prior  At the L5-S1  level, broad-based bulging is noted towards each neural foramen with mild to moderate neural foraminal narrowing similar to on the prior.  Minimal central canal narrowing is noted with thecal sac measuring approximately 10 mm unchanged since prior.       Labs:  BMP  Lab Results   Component Value Date     05/06/2024    K 4.9 05/06/2024     05/06/2024    CO2 25 05/06/2024    BUN 24 (H) 05/06/2024    CREATININE 0.8 05/06/2024    CALCIUM 8.8 05/06/2024    ANIONGAP 10 05/06/2024     Lab Results   Component Value Date    ALT 32 05/06/2024    AST 50 (H) 05/06/2024    ALKPHOS 215 (H) 05/06/2024    BILITOT 1.9 (H) 05/06/2024       Assessment:   Problem List Items Addressed This Visit    None  Visit Diagnoses       Lumbar radiculopathy    -  Primary    Thrombocytopenia        Relevant Orders    CBC Without Differential    Lumbar spondylosis        Spinal stenosis of lumbar region with neurogenic claudication        DDD (degenerative disc disease), lumbar                        69 y.o. year old female presents to the office with back pain, constant, 6/10, radiating down the back of both legs.  + numbness, no weakness.    6/26/2024: Safia Ibarra returns to the office for follow up. She is s/p L4/5 NATASHA on 06/12/2024 with 10% relief.  Today she is reporting continued pain radiating down her right leg into right foot, 7/10, worsened with standing and walking.  She denies any associated numbness, weakness or any new changes to her bowel bladder function.  She does not feel like she can walk any extended distances due to worsening pain.  She continues to take gabapentin and tramadol with minimal relief.    - presents in wheelchair wheelchair,  but otherwise can ambulate on her own.  She has full strength in his lower extremities and intact sensation to light touch bilateral L2-S1  - She is s/p L4/5 NATASHA on 06/12/2024 with 10% relief.  - I independently reviewed her lumbar MRI, She has a right paracentral disc  protrusion at L3-4 with severe central canal narrowing at L3-4.  At L4-5 she has severe central canal narrowing.  She has multilevel bilateral facet arthropathy  - unfortunately, she found minimal relief with most recent lumbar epidural.  - she continues to have pain that limits her mobility interferes with her ADLs and quality of life.  - at this time, in order to maximize all conservative options, I would like to try 1 more epidural at L5/S1.  - she continues to deal with chronic thrombocytopenia, we will order updated labs prior to NATASHA.  - discussed with patient if she fails to get relief with this we will refer her to Neurosurgery for further evaluation.  If she has not a surgical candidate she may be a spinal cord stimulator candidate.        :  Reviewed    The above note was completed, in part, with the aid of Dragon dictation software/hardware. Translation errors may be present.

## 2024-06-27 DIAGNOSIS — M54.16 LUMBAR RADICULOPATHY: Primary | ICD-10-CM

## 2024-06-27 RX ORDER — ALPRAZOLAM 1 MG/1
1 TABLET, ORALLY DISINTEGRATING ORAL ONCE AS NEEDED
OUTPATIENT
Start: 2024-06-27 | End: 2035-11-24

## 2024-06-27 NOTE — TELEPHONE ENCOUNTER
Spoke with patient and scheduled. Advised to hold NSAIDS x 2 day prior. Pre op information given and follow up appointment scheduled.

## 2024-07-15 ENCOUNTER — LAB VISIT (OUTPATIENT)
Dept: LAB | Facility: HOSPITAL | Age: 69
End: 2024-07-15
Payer: COMMERCIAL

## 2024-07-15 DIAGNOSIS — D69.6 THROMBOCYTOPENIA: ICD-10-CM

## 2024-07-15 LAB
ERYTHROCYTE [DISTWIDTH] IN BLOOD BY AUTOMATED COUNT: 17.2 % (ref 11.5–14.5)
HCT VFR BLD AUTO: 34.9 % (ref 37–48.5)
HGB BLD-MCNC: 11.1 G/DL (ref 12–16)
MCH RBC QN AUTO: 29.3 PG (ref 27–31)
MCHC RBC AUTO-ENTMCNC: 31.8 G/DL (ref 32–36)
MCV RBC AUTO: 92 FL (ref 82–98)
PLATELET # BLD AUTO: 84 K/UL (ref 150–450)
PMV BLD AUTO: 10.2 FL (ref 9.2–12.9)
RBC # BLD AUTO: 3.79 M/UL (ref 4–5.4)
WBC # BLD AUTO: 5.28 K/UL (ref 3.9–12.7)

## 2024-07-15 PROCEDURE — 85027 COMPLETE CBC AUTOMATED: CPT

## 2024-07-15 PROCEDURE — 36415 COLL VENOUS BLD VENIPUNCTURE: CPT | Mod: PO

## 2024-07-16 ENCOUNTER — TELEPHONE (OUTPATIENT)
Dept: PAIN MEDICINE | Facility: CLINIC | Age: 69
End: 2024-07-16
Payer: MEDICARE

## 2024-07-16 NOTE — TELEPHONE ENCOUNTER
----- Message from Amos Krishnamurthy PA-C sent at 7/16/2024  2:37 PM CDT -----  Please make sure we update patient on her canceled procedure.

## 2024-07-16 NOTE — PROGRESS NOTES
Please let patient know, unfortunately her platelets are below 100 K and we are unable to proceed with epidural scheduled for tomorrow.    We will need to postpone this until her platelets are above 100 K.

## 2024-08-06 ENCOUNTER — E-CONSULT (OUTPATIENT)
Dept: HEMATOLOGY/ONCOLOGY | Facility: CLINIC | Age: 69
End: 2024-08-06
Payer: MEDICARE

## 2024-08-06 ENCOUNTER — LAB VISIT (OUTPATIENT)
Dept: LAB | Facility: HOSPITAL | Age: 69
End: 2024-08-06
Payer: COMMERCIAL

## 2024-08-06 ENCOUNTER — OFFICE VISIT (OUTPATIENT)
Dept: PAIN MEDICINE | Facility: CLINIC | Age: 69
End: 2024-08-06
Payer: COMMERCIAL

## 2024-08-06 VITALS
WEIGHT: 266.63 LBS | BODY MASS INDEX: 50.34 KG/M2 | HEIGHT: 61 IN | HEART RATE: 80 BPM | DIASTOLIC BLOOD PRESSURE: 74 MMHG | SYSTOLIC BLOOD PRESSURE: 121 MMHG

## 2024-08-06 DIAGNOSIS — M47.816 LUMBAR SPONDYLOSIS: ICD-10-CM

## 2024-08-06 DIAGNOSIS — M48.062 SPINAL STENOSIS OF LUMBAR REGION WITH NEUROGENIC CLAUDICATION: ICD-10-CM

## 2024-08-06 DIAGNOSIS — D69.6 THROMBOCYTOPENIA: ICD-10-CM

## 2024-08-06 DIAGNOSIS — D69.6 THROMBOCYTOPENIA: Primary | ICD-10-CM

## 2024-08-06 DIAGNOSIS — M54.16 LUMBAR RADICULOPATHY: Primary | ICD-10-CM

## 2024-08-06 DIAGNOSIS — M51.36 DDD (DEGENERATIVE DISC DISEASE), LUMBAR: ICD-10-CM

## 2024-08-06 LAB
ERYTHROCYTE [DISTWIDTH] IN BLOOD BY AUTOMATED COUNT: 17.3 % (ref 11.5–14.5)
HCT VFR BLD AUTO: 36.5 % (ref 37–48.5)
HGB BLD-MCNC: 11.5 G/DL (ref 12–16)
MCH RBC QN AUTO: 29.1 PG (ref 27–31)
MCHC RBC AUTO-ENTMCNC: 31.5 G/DL (ref 32–36)
MCV RBC AUTO: 92 FL (ref 82–98)
PLATELET # BLD AUTO: 96 K/UL (ref 150–450)
PMV BLD AUTO: 10 FL (ref 9.2–12.9)
RBC # BLD AUTO: 3.95 M/UL (ref 4–5.4)
WBC # BLD AUTO: 5.13 K/UL (ref 3.9–12.7)

## 2024-08-06 PROCEDURE — 3288F FALL RISK ASSESSMENT DOCD: CPT | Mod: CPTII,S$GLB,,

## 2024-08-06 PROCEDURE — 3074F SYST BP LT 130 MM HG: CPT | Mod: CPTII,S$GLB,,

## 2024-08-06 PROCEDURE — 36415 COLL VENOUS BLD VENIPUNCTURE: CPT | Mod: PO

## 2024-08-06 PROCEDURE — 85027 COMPLETE CBC AUTOMATED: CPT

## 2024-08-06 PROCEDURE — 99999 PR PBB SHADOW E&M-EST. PATIENT-LVL III: CPT | Mod: PBBFAC,,,

## 2024-08-06 PROCEDURE — 99451 NTRPROF PH1/NTRNET/EHR 5/>: CPT | Mod: S$PBB,,, | Performed by: INTERNAL MEDICINE

## 2024-08-06 PROCEDURE — 4010F ACE/ARB THERAPY RXD/TAKEN: CPT | Mod: CPTII,S$GLB,,

## 2024-08-06 PROCEDURE — 1125F AMNT PAIN NOTED PAIN PRSNT: CPT | Mod: CPTII,S$GLB,,

## 2024-08-06 PROCEDURE — 1101F PT FALLS ASSESS-DOCD LE1/YR: CPT | Mod: CPTII,S$GLB,,

## 2024-08-06 PROCEDURE — 99214 OFFICE O/P EST MOD 30 MIN: CPT | Mod: S$GLB,,,

## 2024-08-06 PROCEDURE — 3008F BODY MASS INDEX DOCD: CPT | Mod: CPTII,S$GLB,,

## 2024-08-06 PROCEDURE — 3078F DIAST BP <80 MM HG: CPT | Mod: CPTII,S$GLB,,

## 2024-08-07 ENCOUNTER — PATIENT MESSAGE (OUTPATIENT)
Dept: PAIN MEDICINE | Facility: CLINIC | Age: 69
End: 2024-08-07
Payer: MEDICARE

## 2024-08-07 ENCOUNTER — TELEPHONE (OUTPATIENT)
Dept: PAIN MEDICINE | Facility: CLINIC | Age: 69
End: 2024-08-07
Payer: MEDICARE

## 2024-08-07 NOTE — TELEPHONE ENCOUNTER
----- Message from Candie Arrington sent at 8/7/2024 12:04 PM CDT -----  Contact: self  Type:  Patient Returning Call    Who Called:  patient   Who Left Message for Patient:  Kiarra   Does the patient know what this is regarding?:  She did rec'd your msg and understand that her blood is still too low to have a procedure  Best Call Back Number:  [phone]  Additional Information:  Thanks

## 2024-08-07 NOTE — TELEPHONE ENCOUNTER
Please let patient know, unfortunately her platelets are still too low to proceed with lumbar epidural.

## 2024-08-13 ENCOUNTER — LAB VISIT (OUTPATIENT)
Dept: LAB | Facility: HOSPITAL | Age: 69
End: 2024-08-13
Attending: INTERNAL MEDICINE
Payer: COMMERCIAL

## 2024-08-13 DIAGNOSIS — E78.5 HYPERLIPEMIA: ICD-10-CM

## 2024-08-13 DIAGNOSIS — Z79.899 ENCOUNTER FOR LONG-TERM (CURRENT) USE OF OTHER MEDICATIONS: Primary | ICD-10-CM

## 2024-08-13 DIAGNOSIS — I10 ESSENTIAL HYPERTENSION, MALIGNANT: ICD-10-CM

## 2024-08-13 DIAGNOSIS — D69.6 THROMBOCYTOPENIA: ICD-10-CM

## 2024-08-13 DIAGNOSIS — E03.9 PRIMARY HYPOTHYROIDISM: ICD-10-CM

## 2024-08-13 DIAGNOSIS — R73.01 ELEVATED FASTING GLUCOSE: ICD-10-CM

## 2024-08-13 DIAGNOSIS — Z79.899 ENCOUNTER FOR LONG-TERM (CURRENT) USE OF OTHER MEDICATIONS: ICD-10-CM

## 2024-08-13 LAB
ALBUMIN SERPL BCP-MCNC: 3.2 G/DL (ref 3.5–5.2)
ALP SERPL-CCNC: 152 U/L (ref 55–135)
ALT SERPL W/O P-5'-P-CCNC: 21 U/L (ref 10–44)
ANION GAP SERPL CALC-SCNC: 9 MMOL/L (ref 8–16)
AST SERPL-CCNC: 42 U/L (ref 10–40)
BILIRUB SERPL-MCNC: 2.4 MG/DL (ref 0.1–1)
BILIRUB UR QL STRIP: ABNORMAL
BUN SERPL-MCNC: 12 MG/DL (ref 8–23)
CALCIUM SERPL-MCNC: 9.4 MG/DL (ref 8.7–10.5)
CHLORIDE SERPL-SCNC: 110 MMOL/L (ref 95–110)
CHOLEST SERPL-MCNC: 105 MG/DL (ref 120–199)
CHOLEST/HDLC SERPL: 2.7 {RATIO} (ref 2–5)
CLARITY UR: CLEAR
CO2 SERPL-SCNC: 23 MMOL/L (ref 23–29)
COLOR UR: YELLOW
CREAT SERPL-MCNC: 0.7 MG/DL (ref 0.5–1.4)
EST. GFR  (NO RACE VARIABLE): >60 ML/MIN/1.73 M^2
ESTIMATED AVG GLUCOSE: 77 MG/DL (ref 68–131)
GLUCOSE SERPL-MCNC: 88 MG/DL (ref 70–110)
GLUCOSE UR QL STRIP: NEGATIVE
HBA1C MFR BLD: 4.3 % (ref 4–5.6)
HDLC SERPL-MCNC: 39 MG/DL (ref 40–75)
HDLC SERPL: 37.1 % (ref 20–50)
HGB UR QL STRIP: NEGATIVE
KETONES UR QL STRIP: NEGATIVE
LDLC SERPL CALC-MCNC: 50 MG/DL (ref 63–159)
LEUKOCYTE ESTERASE UR QL STRIP: NEGATIVE
NITRITE UR QL STRIP: NEGATIVE
NONHDLC SERPL-MCNC: 66 MG/DL
PH UR STRIP: 8 [PH] (ref 5–8)
POTASSIUM SERPL-SCNC: 4.5 MMOL/L (ref 3.5–5.1)
PROT SERPL-MCNC: 5.8 G/DL (ref 6–8.4)
PROT UR QL STRIP: NEGATIVE
SODIUM SERPL-SCNC: 142 MMOL/L (ref 136–145)
SP GR UR STRIP: 1.02 (ref 1–1.03)
T4 FREE SERPL-MCNC: 1.48 NG/DL (ref 0.71–1.51)
TRIGL SERPL-MCNC: 80 MG/DL (ref 30–150)
TSH SERPL DL<=0.005 MIU/L-ACNC: 0.01 UIU/ML (ref 0.4–4)
URN SPEC COLLECT METH UR: ABNORMAL

## 2024-08-13 PROCEDURE — 36415 COLL VENOUS BLD VENIPUNCTURE: CPT | Mod: PO

## 2024-08-13 PROCEDURE — 81003 URINALYSIS AUTO W/O SCOPE: CPT | Mod: PO

## 2024-08-13 PROCEDURE — 80061 LIPID PANEL: CPT

## 2024-08-13 PROCEDURE — 83036 HEMOGLOBIN GLYCOSYLATED A1C: CPT

## 2024-08-13 PROCEDURE — 80053 COMPREHEN METABOLIC PANEL: CPT

## 2024-08-13 PROCEDURE — 84443 ASSAY THYROID STIM HORMONE: CPT

## 2024-08-13 PROCEDURE — 84439 ASSAY OF FREE THYROXINE: CPT

## 2024-09-06 ENCOUNTER — TELEPHONE (OUTPATIENT)
Dept: PAIN MEDICINE | Facility: CLINIC | Age: 69
End: 2024-09-06
Payer: MEDICARE

## 2024-09-06 ENCOUNTER — LAB VISIT (OUTPATIENT)
Dept: LAB | Facility: HOSPITAL | Age: 69
End: 2024-09-06
Payer: COMMERCIAL

## 2024-09-06 DIAGNOSIS — D69.6 THROMBOCYTOPENIA: ICD-10-CM

## 2024-09-06 LAB
ERYTHROCYTE [DISTWIDTH] IN BLOOD BY AUTOMATED COUNT: 16.1 % (ref 11.5–14.5)
HCT VFR BLD AUTO: 39.8 % (ref 37–48.5)
HGB BLD-MCNC: 12.9 G/DL (ref 12–16)
MCH RBC QN AUTO: 30.3 PG (ref 27–31)
MCHC RBC AUTO-ENTMCNC: 32.4 G/DL (ref 32–36)
MCV RBC AUTO: 93 FL (ref 82–98)
PLATELET # BLD AUTO: 88 K/UL (ref 150–450)
PMV BLD AUTO: 10.5 FL (ref 9.2–12.9)
RBC # BLD AUTO: 4.26 M/UL (ref 4–5.4)
WBC # BLD AUTO: 5.49 K/UL (ref 3.9–12.7)

## 2024-09-06 PROCEDURE — 85027 COMPLETE CBC AUTOMATED: CPT

## 2024-09-06 PROCEDURE — 36415 COLL VENOUS BLD VENIPUNCTURE: CPT | Mod: PO

## 2024-09-06 NOTE — TELEPHONE ENCOUNTER
Spoke  with pt per provider to inform her that she will have a lab visit today not a clinic visit. Pt scheduled her visit via [employer]. Pt  is aware will come in for lab visit.

## 2024-10-10 ENCOUNTER — TELEPHONE (OUTPATIENT)
Dept: GASTROENTEROLOGY | Facility: CLINIC | Age: 69
End: 2024-10-10
Payer: MEDICARE

## 2024-10-10 NOTE — TELEPHONE ENCOUNTER
Returned call to the patients' daughter, an office visit was set up for the patient, daughter to bring us report as well as CD for review at office visit, daughter verbalized understanding of appointment date and time.

## 2024-10-10 NOTE — TELEPHONE ENCOUNTER
----- Message from Verena sent at 10/10/2024 10:05 AM CDT -----  Regarding: return call  Contact: Alisia stoll  Type:  Patient Returning Call    Who Called:  Alisia daughter  Who Left Message for Patient:  Falguni  Does the patient know what this is regarding?:    Best Call Back Number:  [phone]  Additional Information:  Please call daughter to advise.  Thanks!

## 2024-10-10 NOTE — TELEPHONE ENCOUNTER
----- Message from Justin sent at 10/10/2024  9:56 AM CDT -----  Contact: Daughter  Type: Needs Medical Advice  Who Called:  Alisia/Daughter     Best Call Back Number: [phone]  Additional Information: States she would like to speak with office regarding pt getting aby for a scope procedure.Please call back

## 2024-10-10 NOTE — TELEPHONE ENCOUNTER
----- Message from Verena sent at 10/10/2024 10:05 AM CDT -----  Regarding: return call  Contact: Alisia stoll  Type:  Patient Returning Call    Who Called:  Alisia daughter  Who Left Message for Patient:  Falguni  Does the patient know what this is regarding?:    Best Call Back Number:  [phone]  Additional Information:  Please call daughter to advise.  Thanks!

## 2024-12-01 DIAGNOSIS — I10 PRIMARY HYPERTENSION: Primary | ICD-10-CM

## 2024-12-02 RX ORDER — LOSARTAN POTASSIUM 25 MG/1
TABLET ORAL
Qty: 45 TABLET | Refills: 1 | Status: SHIPPED | OUTPATIENT
Start: 2024-12-02

## 2024-12-11 ENCOUNTER — TELEPHONE (OUTPATIENT)
Dept: TRANSPLANT | Facility: CLINIC | Age: 69
End: 2024-12-11
Payer: MEDICARE

## 2024-12-12 NOTE — TELEPHONE ENCOUNTER
----- Message from Barbara Aaron sent at 12/10/2024  3:59 PM CST -----    ----- Message -----  From: Jaylin Giang  Sent: 12/10/2024  11:09 AM CST  To: [facility] Liver Referral Pool    Hello,    I have pt being referred for K63.5 Polyp of Colon  unspecified part of colon, unspecified type K63.5  Diverticulosis K 57.90  TAO K75.81.  I have scanned the referral /records in to media mgr within Epic. Please review and contact for scheduling,thanks!           Jaylin LEE   [facility]

## 2024-12-12 NOTE — TELEPHONE ENCOUNTER
Initial  referral from Dr Bear Lopez  [phone]  [facility] cirrhosis   ICD-10:  K75.81  Referred for liver transplant for CONSULT.    Referral completed and forwarded to [facility].          Insurance:[facility]    RECORDS SCANNED IN MEDIA UNDER HEPATOLOGY REFERRAL .

## 2024-12-31 ENCOUNTER — TELEPHONE (OUTPATIENT)
Dept: TRANSPLANT | Facility: CLINIC | Age: 69
End: 2024-12-31
Payer: MEDICARE

## 2024-12-31 NOTE — TELEPHONE ENCOUNTER
----- Message from [employer] sent at 12/31/2024  7:55 AM CST -----    Called [phone] (Home Phone) pt to [facility] an appt, but there was no answer. LVM for them to call back to [facility] an appt.    Appt [facility]'ed for [facility]ARCH  in [facility]'ed.  .

## 2025-01-03 ENCOUNTER — TELEPHONE (OUTPATIENT)
Dept: TRANSPLANT | Facility: CLINIC | Age: 70
End: 2025-01-03
Payer: MEDICARE

## 2025-01-03 NOTE — TELEPHONE ENCOUNTER
----- Message from Anomo sent at 1/3/2025 12:09 PM CST -----    Called [phone] (Home Phone) pt to [facility] an appt, but there was no answer. LVM for them to call back to [facility] an appt.    Called Alisia (Daughter) [phone] (Mobile), but there was no answer. LVM for her to call back.    Appt [facility]'ed for MARCH  in [facility]'ed.  .

## 2025-01-06 ENCOUNTER — TELEPHONE (OUTPATIENT)
Dept: PAIN MEDICINE | Facility: CLINIC | Age: 70
End: 2025-01-06
Payer: MEDICARE

## 2025-01-06 ENCOUNTER — TELEPHONE (OUTPATIENT)
Dept: TRANSPLANT | Facility: CLINIC | Age: 70
End: 2025-01-06
Payer: MEDICARE

## 2025-01-06 NOTE — TELEPHONE ENCOUNTER
----- Message from Teion sent at 1/6/2025  4:08 PM CST -----    Rec'leela call back from pt daughter to [facility] CON appts; appts have caio [facility]'ed for 1/30.  .

## 2025-01-06 NOTE — TELEPHONE ENCOUNTER
Spoke with patient and let her know to come in on 1/13 for that appointment. Canceled her appt on 1/10

## 2025-01-06 NOTE — TELEPHONE ENCOUNTER
----- Message from Nicci sent at 1/6/2025  1:52 PM CST -----  Contact: self  Type: Needs Medical Advice  Who Called:  the patient     Best Call Back Number: [phone]  Additional Information: pt called to verify appt but she has two appts 1/10 and 1/13 which appt is she supposed to go to? Please call to advise. Pt is getting blood work tomorrow, if pt needs bloodwork please send lab orders for 1/7 so that she may go ahead and get them done, she is going to go see if platelets are higher.

## 2025-01-06 NOTE — TELEPHONE ENCOUNTER
----- Message from Joann sent at 1/6/2025  3:51 PM CST -----  Regarding: FW: missed call sched  Contact: Patient can be contacted @#v  [phone]'    Returned call to pt, but there was no answer. LVM for her to call back.  .  ----- Message -----  From: Joann Carlton  Sent: 1/3/2025   1:58 PM CST  To: Joann Carlton  Subject: FW: missed call sched                              ----- Message -----  From: Loida Corona  Sent: 1/3/2025  12:53 PM CST  To: [facility] Pre-Liver Transplant Non-Clinical  Subject: missed call sched                                PT is calling returning missed call to discuss scheduling. Please reach back to patient at earliest opportunity  PT states she is going to [facility] on the 7th for labs     Patient can be contacted @#v  [phone]

## 2025-01-13 ENCOUNTER — OFFICE VISIT (OUTPATIENT)
Dept: PAIN MEDICINE | Facility: CLINIC | Age: 70
End: 2025-01-13
Payer: MEDICARE

## 2025-01-13 ENCOUNTER — TELEPHONE (OUTPATIENT)
Dept: PAIN MEDICINE | Facility: CLINIC | Age: 70
End: 2025-01-13

## 2025-01-13 VITALS
HEIGHT: 60 IN | DIASTOLIC BLOOD PRESSURE: 83 MMHG | SYSTOLIC BLOOD PRESSURE: 166 MMHG | WEIGHT: 280.75 LBS | BODY MASS INDEX: 55.12 KG/M2 | HEART RATE: 69 BPM

## 2025-01-13 DIAGNOSIS — M54.16 LUMBAR RADICULOPATHY: Primary | ICD-10-CM

## 2025-01-13 DIAGNOSIS — M48.062 SPINAL STENOSIS OF LUMBAR REGION WITH NEUROGENIC CLAUDICATION: ICD-10-CM

## 2025-01-13 PROCEDURE — 99999 PR PBB SHADOW E&M-EST. PATIENT-LVL III: CPT | Mod: PBBFAC,,, | Performed by: ANESTHESIOLOGY

## 2025-01-13 PROCEDURE — 99214 OFFICE O/P EST MOD 30 MIN: CPT | Mod: S$PBB,,, | Performed by: ANESTHESIOLOGY

## 2025-01-13 PROCEDURE — 99213 OFFICE O/P EST LOW 20 MIN: CPT | Mod: PBBFAC,PO | Performed by: ANESTHESIOLOGY

## 2025-01-13 NOTE — H&P (VIEW-ONLY)
[facility] Follow Up Evaluation    Referred by: Dr. Singletary  Reason for referral: back pain    CC:   Chief Complaint   Patient presents with    Low-back Pain    Leg Pain     Outside leg both          1/13/2025     3:41 PM 8/6/2024     1:25 PM 6/26/2024     9:40 AM   Last 3 PDI Scores   Pain Disability Index (PDI) 40 9 25       Interval HPI 1/13/2025: Safia Ibarra returns to the office for follow up.  Today she reports continued back pain with radiating pain down her bilateral legs.  She is in a wheelchair today but reports she typically ambulates with a walker.  Her pain worsens when she stands and walks.  She rates her pain as 8/10.  She denies any new numbness or weakness.    Pain intervention history:  - s/p L5-S1 NATASHA on 1/3/22 with 80% relief   - s/p L5-S1 NATASHA on 9/21/22 with 70% relief lasting for over 5 months  - s/p L5/S1 NATASHA on 4/26/23 without relief  - s/p L4/5 NATASHA on 06/05/2023 with initially 80% relief.  - s/p L4/5 NATASHA on 09/05/2023 with greater than 50% relief  - s/p 1st bilateral L4/5 and L5/S1 diagnostic medial branch blocks on 03/5/2024 with 80% relief lasting the duration of the anesthetic.  - s/p 2nd bilateral L4/5 and L5/S1 diagnostic medial branch block on 04/10/2024 with 80% relief lasting the duration of the anesthetic.  - s/p bilateral L4/5 and L5/S1 RFA on 05/01/2024 with 50% relief.  - s/p L4/5 NATASHA on 06/12/2024 with over 50% lasting over 3 months    HPI:   Safia Ibarra is a 69 y.o. female who complains of back pain    Onset: 6-7 months  Inciting Event: none  Progression: since onset, pain is gradually worsening  Current Pain Score: 6/10  Typical Range: 2-10/10  Timing: constant  Quality: shooting  Radiation: yes, down the back both legs  Associated numbness or weakness: yes numbness   Exacerbated by: walking  Allievated by: laying down  Is Pain Level Acceptable?: No    Previous Therapies:  PT/OT: yes, completed   HEP: yes  Interventions:   Surgery:  Medications:   -  NSAIDS:   - MSK Relaxants:   - TCAs:   - SNRIs:   - Topicals:   - Anticonvulsants:  - Opioids:     History:    Current Outpatient Medications:     levothyroxine (SYNTHROID, LEVOTHROID) 175 MCG tablet, , Disp: , Rfl:     LIDOcaine (LIDODERM) 5 %, Place 1 patch onto the skin once daily., Disp: , Rfl:     ondansetron (ZOFRAN-ODT) 4 MG TbDL, 4 mg every 6 (six) hours as needed., Disp: , Rfl:     pantoprazole (PROTONIX) 40 MG tablet, Take 40 mg by mouth every morning., Disp: , Rfl:     torsemide (DEMADEX) 20 MG Tab, TAKE 1 TABLET BY MOUTH ONCE DAILY AS NEEDED SWELLING, Disp: , Rfl:     acetaminophen (TYLENOL) 500 MG tablet, Take 500 mg by mouth every 6 (six) hours as needed for Pain., Disp: , Rfl:     fesoterodine (TOVIAZ) 4 mg Tb24, Take 4 mg by mouth once daily., Disp: , Rfl:     gabapentin (NEURONTIN) 300 MG capsule, Take 300 mg by mouth 2 (two) times daily., Disp: , Rfl:     losartan (COZAAR) 25 MG tablet, TAKE 1/2 TABLET BY MOUTH ONCE DAILY IN THE EVENING, Disp: 45 tablet, Rfl: 1    nystatin (MYCOSTATIN) cream, 3 (three) times daily. Apply to affected area, Disp: , Rfl:     rosuvastatin (CRESTOR) 10 MG tablet, Take 10 mg by mouth nightly., Disp: , Rfl:     Past Medical History:   Diagnosis Date    HTN (hypertension)     Hyperlipidemia     Hypothyroidism     Osteoporosis        Past Surgical History:   Procedure Laterality Date    ARTHROPLASTY OF BOTH KNEES Bilateral     BREAST BIOPSY Left     core Dr. Hannah benign over 5 yrs ago    EPIDURAL STEROID INJECTION INTO LUMBAR SPINE N/A 01/03/2022    Procedure: Injection-steroid-epidural-lumbar L5/S1;  Surgeon: Yobani Nieves MD;  Location: [facility] OR;  Service: Pain Management;  Laterality: N/A;    EPIDURAL STEROID INJECTION INTO LUMBAR SPINE N/A 09/21/2022    Procedure: Injection-steroid-epidural-lumbar L5/S1;  Surgeon: Yobani Nieves MD;  Location: [facility] OR;  Service: Pain Management;  Laterality: N/A;    EPIDURAL STEROID INJECTION INTO LUMBAR SPINE N/A 04/26/2023     Procedure: Injection-steroid-epidural-lumbar L5/S1;  Surgeon: Yobani Nieves MD;  Location: [facility] OR;  Service: Pain Management;  Laterality: N/A;    EPIDURAL STEROID INJECTION INTO LUMBAR SPINE N/A 06/05/2023    Procedure: Injection-steroid-epidural-lumbar L4/L5;  Surgeon: Yobani Nieves MD;  Location: [facility] OR;  Service: Pain Management;  Laterality: N/A;    EPIDURAL STEROID INJECTION INTO LUMBAR SPINE N/A 09/05/2023    Procedure: Injection-steroid-epidural-lumbar L4/5;  Surgeon: Yobani Nieves MD;  Location: [facility] OR;  Service: Pain Management;  Laterality: N/A;  L4/5    EPIDURAL STEROID INJECTION INTO LUMBAR SPINE N/A 06/12/2024    Procedure: Injection-steroid-epidural-lumbar   L4/5;  Surgeon: Yobani Nieves MD;  Location: [facility] OR;  Service: Pain Management;  Laterality: N/A;    HYSTERECTOMY  2001    partial    INJECTION OF ANESTHETIC AGENT AROUND MEDIAL BRANCH NERVES INNERVATING LUMBAR FACET JOINT Bilateral 03/05/2024    Procedure: Block-nerve-medial branch-lumbar    L4/5, L5/S1;  Surgeon: Yobani Nieves MD;  Location: [facility] OR;  Service: Pain Management;  Laterality: Bilateral;    INJECTION OF ANESTHETIC AGENT AROUND MEDIAL BRANCH NERVES INNERVATING LUMBAR FACET JOINT Bilateral 04/10/2024    Procedure: Block-nerve-medial branch-lumbar-L4/5&L5/S1;  Surgeon: Yobani Nieves MD;  Location: [facility] OR;  Service: Pain Management;  Laterality: Bilateral;    RADIOFREQUENCY ABLATION OF LUMBAR MEDIAL BRANCH NERVE AT SINGLE LEVEL Bilateral 05/01/2024    Procedure: Radiofrequency Ablation, Nerve, Spinal, Lumbar, Medial Branch, L4/5, L5/S1;  Surgeon: Yobani Nieves MD;  Location: [facility] OR;  Service: Pain Management;  Laterality: Bilateral;       Family History   Problem Relation Name Age of Onset    Uterine cancer Mother      Breast cancer Paternal Aunt          age unknown       Social History     Socioeconomic History    Marital status:    Tobacco Use    Smoking status: Never    Smokeless tobacco: Never    Substance and Sexual Activity    Alcohol use: Never    Drug use: Never    Sexual activity: Not Currently     Social Drivers of Health     Financial Resource Strain: Low Risk  (10/10/2024)    [facility]    Overall Financial Resource Strain (CARDIA)     Difficulty of Paying Living Expenses: Not hard at all   Food Insecurity: No Food Insecurity (10/10/2024)    [facility]    Hunger Vital Sign     Worried About Running Out of Food in the Last Year: Never true     Ran Out of Food in the Last Year: Never true   Transportation Needs: No Transportation Needs (10/10/2024)    [facility]    PRAPARE - Transportation     Lack of Transportation (Medical): No     Lack of Transportation (Non-Medical): No   Physical Activity: Unknown (4/17/2024)    Exercise Vital Sign     Days of Exercise per Week: 2 days   Stress: Stress Concern Present (4/17/2024)    [employer] [place] [facility] [employer] - Occupational Stress Questionnaire     Feeling of Stress : Very much   Housing Stability: Low Risk  (10/10/2024)    [facility]    Housing Stability Vital Sign     Unable to Pay for Housing in the Last Year: No     Number of Times Moved in the Last Year: 0     Homeless in the Last Year: No       Review of patient's allergies indicates:   Allergen Reactions    Bactrim [sulfamethoxazole-trimethoprim] Itching       Review of Systems:  General ROS: negative for - fever  Psychological ROS: negative for - hostility  Hematological and Lymphatic ROS: negative for - bleeding problems  Endocrine ROS: negative for - unexpected weight changes  Respiratory ROS: no cough, shortness of breath, or  wheezing  Cardiovascular ROS: no chest pain or dyspnea on exertion  Gastrointestinal ROS: no abdominal pain, change in bowel habits, or black or bloody stools  Musculoskeletal ROS: negative for - muscular weakness  Neurological ROS: positive for - numbness/tingling  Dermatological ROS: negative for rash    Physical Exam:  Vitals:    01/13/25 1542   BP: (!) 166/83   Pulse: 69   Weight: 127.3 kg (280 lb 12.1 oz)   Height: 5' (1.524 m)   PainSc:   8   PainLoc: Back           Body mass index is 54.83 kg/m².     Gen: NAD  Gait:  Presents in wheelchair.  Can ambulate on own.  Psych:  Mood appropriate for given condition  HEENT: eyes anicteric   GI: Abd soft  CV: RRR  Lungs: breathing unlabored   ROM: limited AROM of the L spine in all planes, full ROM at ankles, knees and hips    Sensation: intact to light touch in all dermatomes tested from L2-S1 bilaterally  Reflexes:   Palpation: No tenderness to palpation over the midline lumbar spine  Tone: normal in the b/l knees and hips   Skin: intact  Extremities: No edema in b/l ankles or hands  Provacative tests:         Right Left   L2/3 Iliacus Hip flexion  5  5   L3/4 Qudratus Femoris Knee Extension  5  5   L4/5 Tib Anterior Ankle Dorsiflexion   5  5   L5/S1 Extensor Hallicus Longus Great toe extension  5  5                 S1/S2 Gastroc/Soleus Plantar Flexion  5  5       Imaging:  Xray lumbar spine 8/4/23  FINDINGS:  Stable mild superior endplate compression fracture deformity of L5 compared to 06/20/2023.  Diffuse bony demineralization without evidence of any new or progressive fractures.  Lower lumbar predominant facet arthrosis and trace anterolisthesis of L4 on L5 redemonstrated.  Degenerative changes most notable at the level of the visualized lower thoracic spine and disc height loss throughout the lumbar spine which is moderate at L3-L4 and L2-L3.  Broad levocurvature.    MRI thoracic spine 12/18/23  Impression:  1. Mild broad dextrocurvature of the thoracic spine.   Sagittal alignment is maintained noting mild exaggerated thoracic kyphosis.  2. Mild degenerative changes as discussed above without evidence of any significant spinal canal or foraminal narrowing.    MRI cervical spine 12/18/23  Impression:  1. Motion degraded exam limits evaluation.  No acute process.  2. Up to mild multifactorial spinal canal narrowing.  No cord impingement.  3. Uncovertebral spurring and facet arthrosis produce foraminal narrowing.  Limited evaluation suggests at least moderate to severe right and moderate left C3-C4, severe left/moderate right C4-C5, and moderate bilateral C5-C6 narrowing.    MRI lumbar spine 08/21/2023  FINDINGS:  In the interval since the prior examination there appears to have been the development of compression deformity in the L5 vertebral body with approximately 25-50% vertebral body height loss.  The relative rapid development since the prior favors a posttraumatic process to a neoplastic process.  Please clinically correlate.  This appears to predominantly involve the superior endplate of the L5 vertebral body.  There does not appear to be cortical disruption to suggest an infectious osteomyelitis process which can sometimes appear similar.     The spinal cord appears to terminate at the L1-L2 level.     Collateral vessels are partially visualized in the left upper abdomen, these collateral vessels can be from multiple etiologies including splenorenal shunting associated with liver disease and or portal hypertension.  Please clinically correlate.     At the T11-T12 level, minimal disc bulge is noted without significant central canal stenosis or neural foraminal stenosis.  A similar appearance is noted on the prior.  At the T12-L1 level, no significant disc bulge, central canal stenosis, or neural foraminal stenosis is noted.  At the L1-L2 level, facet arthropathy and ligamentous hypertrophy is noted.  Broad-based bulging is noted towards the left neural foramen greater  than right of 6 mm.  Mild right neural foraminal narrowing is noted.  Moderate left neural foraminal stenosis appears to be present with probable contact of the exiting nerve root on the left likely without significant change since the prior.  Facet arthropathy and ligamentous hypertrophy is noted.  Moderate thecal sac narrowing is noted with the thecal sac narrowed to 9 mm.  Very little fluid surrounds the cord at this level  At the L2-L3 level, facet arthropathy and ligamentous hypertrophy appears to be present.  Disc protrusion into the right lateral recess and neural foramen is noted of 5 mm.  Epidural fat is noted.  Bunching of the nerve roots is noted with very little fluid surrounding the nerve roots and moderate thecal sac stenosis to 8 mm with a similar appearance on the prior.  Broad-based uncovertebral spurring is noted bilaterally with moderate to severe right neural foraminal stenosis and probable contact of the exiting nerve root.  Mild to moderate left neural foraminal stenosis is noted.  The exiting nerve root probably exit unimpinged.  A similar appearance is suspected from prior  At the L3-L4 level, disc protrusion appears to be present into the right and left lateral recesses extending 10 mm posterior to the disc plane.  Facet arthropathy and ligamentous hypertrophy is noted.  Some epidural fat is noted.  There is bunching of the nerve roots and severe thecal sac narrowing of 5-6 mm.  Moderate to severe right neural foraminal stenosis is noted with moderate left neural foraminal stenosis.  The exiting nerve root particularly on the right may be affected.  Mild progression of central canal narrowing may be present since the prior.  At the L4-L5 level, facet arthropathy and ligamentous hypertrophy appears to be present.  Mild broad-based bulge is noted.  Moderate bilateral neural foraminal stenosis is noted.  Contact of the exiting nerve roots may be present.  Some bunching of the nerve roots is  noted with epidural fat.  Moderate to severe thecal sac narrowing is noted the 6 mm.  Moderate bilateral neural foraminal stenosis is noted with probable contact of the exiting nerve roots bilaterally.  The central canal narrowing at this level appears progressed since the prior  At the L5-S1 level, broad-based bulging is noted towards each neural foramen with mild to moderate neural foraminal narrowing similar to on the prior.  Minimal central canal narrowing is noted with thecal sac measuring approximately 10 mm unchanged since prior.       Labs:  BMP  Lab Results   Component Value Date     10/12/2024    K 3.8 10/12/2024     (H) 10/12/2024    CO2 27 10/12/2024    BUN 10 10/12/2024    CREATININE 0.89 10/12/2024    CALCIUM 8.4 (L) 10/12/2024    ANIONGAP 5 (L) 10/12/2024    ESTGFRAFRICA 71 10/12/2024     Lab Results   Component Value Date    ALT 35 10/07/2024    AST 64 (H) 10/07/2024    ALKPHOS 130 10/07/2024    BILITOT 2.6 (H) 10/07/2024       Assessment:   Problem List Items Addressed This Visit    None  Visit Diagnoses       Lumbar radiculopathy    -  Primary    Spinal stenosis of lumbar region with neurogenic claudication                  69 y.o. year old female presents to the office with back pain, constant, 6/10, radiating down the back of both legs.  + numbness, no weakness.        1/13/25 - Safia Ibarra returns to the office for follow up.  Today she reports continued back pain with radiating pain down her bilateral legs.  She is in a wheelchair today but reports she typically ambulates with a walker.  Her pain worsens when she stands and walks.  She rates her pain as 8/10.  She denies any new numbness or weakness.    - on exam she has full strength in his lower extremities.  - I independently reviewed her previous lumbar MRI and at L3-4 she has a right paracentral disc protrusion with severe central canal narrowing.  At L4-5 she has severe central canal narrowing.  She has multilevel  bilateral facet arthropathy  - over the past 12 months she has participate in formal PT and does her best to maintain PT directed home exercises.  She also takes gabapentin for the neuropathic component of her pain  - she continues to report symptoms of claudication and radicular pain that limits her mobility and interfering with the quality of her life  - she is s/p L4/5 NATASHA on 06/12/2024 with over 50% lasting over 3 months  - we will schedule for an L4-5 NATASHA.  The risks and benefits of this intervention, and alternative therapies were discussed with the patient.  The discussion of risks included infection, bleeding, need for additional procedures or surgery, nerve damage.  Questions regarding the procedure, risks, expected outcome, and possible side effects were solicited and answered to the patient's satisfaction.  Safia Keen wishes to proceed with the injection or procedure.  Written consent was obtained.  - we will need to get an updated CBC and INR the day of the procedure as she has a history of thrombocytopenia  - follow up 2-3 weeks post injection      :  Reviewed    The above note was completed, in part, with the aid of Dragon dictation software/hardware. Translation errors may be present.

## 2025-01-13 NOTE — PROGRESS NOTES
[facility] Follow Up Evaluation    Referred by: Dr. Singletary  Reason for referral: back pain    CC:   Chief Complaint   Patient presents with    Low-back Pain    Leg Pain     Outside leg both          1/13/2025     3:41 PM 8/6/2024     1:25 PM 6/26/2024     9:40 AM   Last 3 PDI Scores   Pain Disability Index (PDI) 40 9 25       Interval HPI 1/13/2025: Safia Ibarra returns to the office for follow up.  Today she reports continued back pain with radiating pain down her bilateral legs.  She is in a wheelchair today but reports she typically ambulates with a walker.  Her pain worsens when she stands and walks.  She rates her pain as 8/10.  She denies any new numbness or weakness.    Pain intervention history:  - s/p L5-S1 NATASHA on 1/3/22 with 80% relief   - s/p L5-S1 NATASHA on 9/21/22 with 70% relief lasting for over 5 months  - s/p L5/S1 NATASHA on 4/26/23 without relief  - s/p L4/5 NATASHA on 06/05/2023 with initially 80% relief.  - s/p L4/5 NATASHA on 09/05/2023 with greater than 50% relief  - s/p 1st bilateral L4/5 and L5/S1 diagnostic medial branch blocks on 03/5/2024 with 80% relief lasting the duration of the anesthetic.  - s/p 2nd bilateral L4/5 and L5/S1 diagnostic medial branch block on 04/10/2024 with 80% relief lasting the duration of the anesthetic.  - s/p bilateral L4/5 and L5/S1 RFA on 05/01/2024 with 50% relief.  - s/p L4/5 NATASHA on 06/12/2024 with over 50% lasting over 3 months    HPI:   Safia Ibarra is a 69 y.o. female who complains of back pain    Onset: 6-7 months  Inciting Event: none  Progression: since onset, pain is gradually worsening  Current Pain Score: 6/10  Typical Range: 2-10/10  Timing: constant  Quality: shooting  Radiation: yes, down the back both legs  Associated numbness or weakness: yes numbness   Exacerbated by: walking  Allievated by: laying down  Is Pain Level Acceptable?: No    Previous Therapies:  PT/OT: yes, completed   HEP: yes  Interventions:   Surgery:  Medications:   -  NSAIDS:   - MSK Relaxants:   - TCAs:   - SNRIs:   - Topicals:   - Anticonvulsants:  - Opioids:     History:    Current Outpatient Medications:     levothyroxine (SYNTHROID, LEVOTHROID) 175 MCG tablet, , Disp: , Rfl:     LIDOcaine (LIDODERM) 5 %, Place 1 patch onto the skin once daily., Disp: , Rfl:     ondansetron (ZOFRAN-ODT) 4 MG TbDL, 4 mg every 6 (six) hours as needed., Disp: , Rfl:     pantoprazole (PROTONIX) 40 MG tablet, Take 40 mg by mouth every morning., Disp: , Rfl:     torsemide (DEMADEX) 20 MG Tab, TAKE 1 TABLET BY MOUTH ONCE DAILY AS NEEDED SWELLING, Disp: , Rfl:     acetaminophen (TYLENOL) 500 MG tablet, Take 500 mg by mouth every 6 (six) hours as needed for Pain., Disp: , Rfl:     fesoterodine (TOVIAZ) 4 mg Tb24, Take 4 mg by mouth once daily., Disp: , Rfl:     gabapentin (NEURONTIN) 300 MG capsule, Take 300 mg by mouth 2 (two) times daily., Disp: , Rfl:     losartan (COZAAR) 25 MG tablet, TAKE 1/2 TABLET BY MOUTH ONCE DAILY IN THE EVENING, Disp: 45 tablet, Rfl: 1    nystatin (MYCOSTATIN) cream, 3 (three) times daily. Apply to affected area, Disp: , Rfl:     rosuvastatin (CRESTOR) 10 MG tablet, Take 10 mg by mouth nightly., Disp: , Rfl:     Past Medical History:   Diagnosis Date    HTN (hypertension)     Hyperlipidemia     Hypothyroidism     Osteoporosis        Past Surgical History:   Procedure Laterality Date    ARTHROPLASTY OF BOTH KNEES Bilateral     BREAST BIOPSY Left     core Dr. Hannah benign over 5 yrs ago    EPIDURAL STEROID INJECTION INTO LUMBAR SPINE N/A 01/03/2022    Procedure: Injection-steroid-epidural-lumbar L5/S1;  Surgeon: Yobani Nieves MD;  Location: [facility] OR;  Service: Pain Management;  Laterality: N/A;    EPIDURAL STEROID INJECTION INTO LUMBAR SPINE N/A 09/21/2022    Procedure: Injection-steroid-epidural-lumbar L5/S1;  Surgeon: Yobani Nieves MD;  Location: [facility] OR;  Service: Pain Management;  Laterality: N/A;    EPIDURAL STEROID INJECTION INTO LUMBAR SPINE N/A 04/26/2023     Procedure: Injection-steroid-epidural-lumbar L5/S1;  Surgeon: Yobani Nieves MD;  Location: [facility] OR;  Service: Pain Management;  Laterality: N/A;    EPIDURAL STEROID INJECTION INTO LUMBAR SPINE N/A 06/05/2023    Procedure: Injection-steroid-epidural-lumbar L4/L5;  Surgeon: Yobani Nieves MD;  Location: [facility] OR;  Service: Pain Management;  Laterality: N/A;    EPIDURAL STEROID INJECTION INTO LUMBAR SPINE N/A 09/05/2023    Procedure: Injection-steroid-epidural-lumbar L4/5;  Surgeon: Yobani Nieves MD;  Location: [facility] OR;  Service: Pain Management;  Laterality: N/A;  L4/5    EPIDURAL STEROID INJECTION INTO LUMBAR SPINE N/A 06/12/2024    Procedure: Injection-steroid-epidural-lumbar   L4/5;  Surgeon: Yobani Nieves MD;  Location: [facility] OR;  Service: Pain Management;  Laterality: N/A;    HYSTERECTOMY  2001    partial    INJECTION OF ANESTHETIC AGENT AROUND MEDIAL BRANCH NERVES INNERVATING LUMBAR FACET JOINT Bilateral 03/05/2024    Procedure: Block-nerve-medial branch-lumbar    L4/5, L5/S1;  Surgeon: Yobani Nieves MD;  Location: [facility] OR;  Service: Pain Management;  Laterality: Bilateral;    INJECTION OF ANESTHETIC AGENT AROUND MEDIAL BRANCH NERVES INNERVATING LUMBAR FACET JOINT Bilateral 04/10/2024    Procedure: Block-nerve-medial branch-lumbar-L4/5&L5/S1;  Surgeon: Yobani Nieves MD;  Location: [facility] OR;  Service: Pain Management;  Laterality: Bilateral;    RADIOFREQUENCY ABLATION OF LUMBAR MEDIAL BRANCH NERVE AT SINGLE LEVEL Bilateral 05/01/2024    Procedure: Radiofrequency Ablation, Nerve, Spinal, Lumbar, Medial Branch, L4/5, L5/S1;  Surgeon: Yobani Nieves MD;  Location: [facility] OR;  Service: Pain Management;  Laterality: Bilateral;       Family History   Problem Relation Name Age of Onset    Uterine cancer Mother      Breast cancer Paternal Aunt          age unknown       Social History     Socioeconomic History    Marital status:    Tobacco Use    Smoking status: Never    Smokeless tobacco: Never    Substance and Sexual Activity    Alcohol use: Never    Drug use: Never    Sexual activity: Not Currently     Social Drivers of Health     Financial Resource Strain: Low Risk  (10/10/2024)    [facility]    Overall Financial Resource Strain (CARDIA)     Difficulty of Paying Living Expenses: Not hard at all   Food Insecurity: No Food Insecurity (10/10/2024)    [facility]    Hunger Vital Sign     Worried About Running Out of Food in the Last Year: Never true     Ran Out of Food in the Last Year: Never true   Transportation Needs: No Transportation Needs (10/10/2024)    [facility]    PRAPARE - Transportation     Lack of Transportation (Medical): No     Lack of Transportation (Non-Medical): No   Physical Activity: Unknown (4/17/2024)    Exercise Vital Sign     Days of Exercise per Week: 2 days   Stress: Stress Concern Present (4/17/2024)    [employer] [place] [employer] - Occupational Stress Questionnaire     Feeling of Stress : Very much   Housing Stability: Low Risk  (10/10/2024)    [facility]    Housing Stability Vital Sign     Unable to Pay for Housing in the Last Year: No     Number of Times Moved in the Last Year: 0     Homeless in the Last Year: No       Review of patient's allergies indicates:   Allergen Reactions    Bactrim [sulfamethoxazole-trimethoprim] Itching       Review of Systems:  General ROS: negative for - fever  Psychological ROS: negative for - hostility  Hematological and Lymphatic ROS: negative for - bleeding problems  Endocrine ROS: negative for - unexpected weight changes  Respiratory ROS: no cough, shortness of breath, or  wheezing  Cardiovascular ROS: no chest pain or dyspnea on exertion  Gastrointestinal ROS: no abdominal pain, change in bowel habits, or black or bloody stools  Musculoskeletal ROS: negative for - muscular weakness  Neurological ROS: positive for - numbness/tingling  Dermatological ROS: negative for rash    Physical Exam:  Vitals:    01/13/25 1542   BP: (!) 166/83   Pulse: 69   Weight: 127.3 kg (280 lb 12.1 oz)   Height: 5' (1.524 m)   PainSc:   8   PainLoc: Back           Body mass index is 54.83 kg/m².     Gen: NAD  Gait:  Presents in wheelchair.  Can ambulate on own.  Psych:  Mood appropriate for given condition  HEENT: eyes anicteric   GI: Abd soft  CV: RRR  Lungs: breathing unlabored   ROM: limited AROM of the L spine in all planes, full ROM at ankles, knees and hips    Sensation: intact to light touch in all dermatomes tested from L2-S1 bilaterally  Reflexes:   Palpation: No tenderness to palpation over the midline lumbar spine  Tone: normal in the b/l knees and hips   Skin: intact  Extremities: No edema in b/l ankles or hands  Provacative tests:         Right Left   L2/3 Iliacus Hip flexion  5  5   L3/4 Qudratus Femoris Knee Extension  5  5   L4/5 Tib Anterior Ankle Dorsiflexion   5  5   L5/S1 Extensor Hallicus Longus Great toe extension  5  5                 S1/S2 Gastroc/Soleus Plantar Flexion  5  5       Imaging:  Xray lumbar spine 8/4/23  FINDINGS:  Stable mild superior endplate compression fracture deformity of L5 compared to 06/20/2023.  Diffuse bony demineralization without evidence of any new or progressive fractures.  Lower lumbar predominant facet arthrosis and trace anterolisthesis of L4 on L5 redemonstrated.  Degenerative changes most notable at the level of the visualized lower thoracic spine and disc height loss throughout the lumbar spine which is moderate at L3-L4 and L2-L3.  Broad levocurvature.    MRI thoracic spine 12/18/23  Impression:  1. Mild broad dextrocurvature of the thoracic spine.   Sagittal alignment is maintained noting mild exaggerated thoracic kyphosis.  2. Mild degenerative changes as discussed above without evidence of any significant spinal canal or foraminal narrowing.    MRI cervical spine 12/18/23  Impression:  1. Motion degraded exam limits evaluation.  No acute process.  2. Up to mild multifactorial spinal canal narrowing.  No cord impingement.  3. Uncovertebral spurring and facet arthrosis produce foraminal narrowing.  Limited evaluation suggests at least moderate to severe right and moderate left C3-C4, severe left/moderate right C4-C5, and moderate bilateral C5-C6 narrowing.    MRI lumbar spine 08/21/2023  FINDINGS:  In the interval since the prior examination there appears to have been the development of compression deformity in the L5 vertebral body with approximately 25-50% vertebral body height loss.  The relative rapid development since the prior favors a posttraumatic process to a neoplastic process.  Please clinically correlate.  This appears to predominantly involve the superior endplate of the L5 vertebral body.  There does not appear to be cortical disruption to suggest an infectious osteomyelitis process which can sometimes appear similar.     The spinal cord appears to terminate at the L1-L2 level.     Collateral vessels are partially visualized in the left upper abdomen, these collateral vessels can be from multiple etiologies including splenorenal shunting associated with liver disease and or portal hypertension.  Please clinically correlate.     At the T11-T12 level, minimal disc bulge is noted without significant central canal stenosis or neural foraminal stenosis.  A similar appearance is noted on the prior.  At the T12-L1 level, no significant disc bulge, central canal stenosis, or neural foraminal stenosis is noted.  At the L1-L2 level, facet arthropathy and ligamentous hypertrophy is noted.  Broad-based bulging is noted towards the left neural foramen greater  than right of 6 mm.  Mild right neural foraminal narrowing is noted.  Moderate left neural foraminal stenosis appears to be present with probable contact of the exiting nerve root on the left likely without significant change since the prior.  Facet arthropathy and ligamentous hypertrophy is noted.  Moderate thecal sac narrowing is noted with the thecal sac narrowed to 9 mm.  Very little fluid surrounds the cord at this level  At the L2-L3 level, facet arthropathy and ligamentous hypertrophy appears to be present.  Disc protrusion into the right lateral recess and neural foramen is noted of 5 mm.  Epidural fat is noted.  Bunching of the nerve roots is noted with very little fluid surrounding the nerve roots and moderate thecal sac stenosis to 8 mm with a similar appearance on the prior.  Broad-based uncovertebral spurring is noted bilaterally with moderate to severe right neural foraminal stenosis and probable contact of the exiting nerve root.  Mild to moderate left neural foraminal stenosis is noted.  The exiting nerve root probably exit unimpinged.  A similar appearance is suspected from prior  At the L3-L4 level, disc protrusion appears to be present into the right and left lateral recesses extending 10 mm posterior to the disc plane.  Facet arthropathy and ligamentous hypertrophy is noted.  Some epidural fat is noted.  There is bunching of the nerve roots and severe thecal sac narrowing of 5-6 mm.  Moderate to severe right neural foraminal stenosis is noted with moderate left neural foraminal stenosis.  The exiting nerve root particularly on the right may be affected.  Mild progression of central canal narrowing may be present since the prior.  At the L4-L5 level, facet arthropathy and ligamentous hypertrophy appears to be present.  Mild broad-based bulge is noted.  Moderate bilateral neural foraminal stenosis is noted.  Contact of the exiting nerve roots may be present.  Some bunching of the nerve roots is  noted with epidural fat.  Moderate to severe thecal sac narrowing is noted the 6 mm.  Moderate bilateral neural foraminal stenosis is noted with probable contact of the exiting nerve roots bilaterally.  The central canal narrowing at this level appears progressed since the prior  At the L5-S1 level, broad-based bulging is noted towards each neural foramen with mild to moderate neural foraminal narrowing similar to on the prior.  Minimal central canal narrowing is noted with thecal sac measuring approximately 10 mm unchanged since prior.       Labs:  BMP  Lab Results   Component Value Date     10/12/2024    K 3.8 10/12/2024     (H) 10/12/2024    CO2 27 10/12/2024    BUN 10 10/12/2024    CREATININE 0.89 10/12/2024    CALCIUM 8.4 (L) 10/12/2024    ANIONGAP 5 (L) 10/12/2024    ESTGFRAFRICA 71 10/12/2024     Lab Results   Component Value Date    ALT 35 10/07/2024    AST 64 (H) 10/07/2024    ALKPHOS 130 10/07/2024    BILITOT 2.6 (H) 10/07/2024       Assessment:   Problem List Items Addressed This Visit    None  Visit Diagnoses       Lumbar radiculopathy    -  Primary    Spinal stenosis of lumbar region with neurogenic claudication                  69 y.o. year old female presents to the office with back pain, constant, 6/10, radiating down the back of both legs.  + numbness, no weakness.        1/13/25 - Safia Ibarra returns to the office for follow up.  Today she reports continued back pain with radiating pain down her bilateral legs.  She is in a wheelchair today but reports she typically ambulates with a walker.  Her pain worsens when she stands and walks.  She rates her pain as 8/10.  She denies any new numbness or weakness.    - on exam she has full strength in his lower extremities.  - I independently reviewed her previous lumbar MRI and at L3-4 she has a right paracentral disc protrusion with severe central canal narrowing.  At L4-5 she has severe central canal narrowing.  She has multilevel  bilateral facet arthropathy  - over the past 12 months she has participate in formal PT and does her best to maintain PT directed home exercises.  She also takes gabapentin for the neuropathic component of her pain  - she continues to report symptoms of claudication and radicular pain that limits her mobility and interfering with the quality of her life  - she is s/p L4/5 NATASHA on 06/12/2024 with over 50% lasting over 3 months  - we will schedule for an L4-5 NATASHA.  The risks and benefits of this intervention, and alternative therapies were discussed with the patient.  The discussion of risks included infection, bleeding, need for additional procedures or surgery, nerve damage.  Questions regarding the procedure, risks, expected outcome, and possible side effects were solicited and answered to the patient's satisfaction.  Safia Keen wishes to proceed with the injection or procedure.  Written consent was obtained.  - we will need to get an updated CBC and INR the day of the procedure as she has a history of thrombocytopenia  - follow up 2-3 weeks post injection      :  Reviewed    The above note was completed, in part, with the aid of Dragon dictation software/hardware. Translation errors may be present.

## 2025-01-13 NOTE — TELEPHONE ENCOUNTER
Please order a CBC and an INR to be done stat to be done day of procedure    Physician - Dr Nieves    Type of Procedure/Injection - Lumbar Epidural  L4/5           Laterality - NA      Priority - Normal      Anxiolysis- Local      Need to hold medication - No      Clearance needed - No      Follow up - 3 week

## 2025-01-15 DIAGNOSIS — M54.16 LUMBAR RADICULOPATHY: Primary | ICD-10-CM

## 2025-01-15 DIAGNOSIS — Z01.818 PRE-OP TESTING: Primary | ICD-10-CM

## 2025-01-15 DIAGNOSIS — R79.1 ABNORMAL COAGULATION PROFILE: ICD-10-CM

## 2025-01-15 RX ORDER — ALPRAZOLAM 1 MG/1
1 TABLET, ORALLY DISINTEGRATING ORAL ONCE AS NEEDED
OUTPATIENT
Start: 2025-01-15 | End: 2036-06-13

## 2025-01-15 NOTE — TELEPHONE ENCOUNTER
Spoke with patient and scheduled lumbar NATASHA with Dr. Nieves. Orders placed for STAT CBC and INR. Patient aware to have done on day of procedure. Pre op information given to patient and follow up appointment scheduled.

## 2025-01-29 DIAGNOSIS — K74.60 HEPATIC CIRRHOSIS, UNSPECIFIED HEPATIC CIRRHOSIS TYPE, UNSPECIFIED WHETHER ASCITES PRESENT: Primary | ICD-10-CM

## 2025-01-29 DIAGNOSIS — Z76.82 ORGAN TRANSPLANT CANDIDATE: ICD-10-CM

## 2025-01-30 ENCOUNTER — OFFICE VISIT (OUTPATIENT)
Dept: TRANSPLANT | Facility: CLINIC | Age: 70
End: 2025-01-30
Payer: MEDICARE

## 2025-01-30 ENCOUNTER — LAB VISIT (OUTPATIENT)
Dept: LAB | Facility: HOSPITAL | Age: 70
End: 2025-01-30
Payer: MEDICARE

## 2025-01-30 VITALS
OXYGEN SATURATION: 98 % | HEART RATE: 70 BPM | SYSTOLIC BLOOD PRESSURE: 144 MMHG | TEMPERATURE: 97 F | DIASTOLIC BLOOD PRESSURE: 66 MMHG | WEIGHT: 275.13 LBS | RESPIRATION RATE: 18 BRPM | HEIGHT: 60 IN | BODY MASS INDEX: 54.02 KG/M2

## 2025-01-30 DIAGNOSIS — K74.60 LIVER CIRRHOSIS SECONDARY TO NASH: ICD-10-CM

## 2025-01-30 DIAGNOSIS — K76.82 HEPATIC ENCEPHALOPATHY: ICD-10-CM

## 2025-01-30 DIAGNOSIS — K74.60 HEPATIC CIRRHOSIS, UNSPECIFIED HEPATIC CIRRHOSIS TYPE, UNSPECIFIED WHETHER ASCITES PRESENT: ICD-10-CM

## 2025-01-30 DIAGNOSIS — K75.81 LIVER CIRRHOSIS SECONDARY TO NASH: ICD-10-CM

## 2025-01-30 DIAGNOSIS — Z76.82 ORGAN TRANSPLANT CANDIDATE: ICD-10-CM

## 2025-01-30 LAB
AFP SERPL-MCNC: 3.9 NG/ML (ref 0–8.4)
ALBUMIN SERPL BCP-MCNC: 2.9 G/DL (ref 3.5–5.2)
ALP SERPL-CCNC: 134 U/L (ref 40–150)
ALT SERPL W/O P-5'-P-CCNC: 19 U/L (ref 10–44)
ANION GAP SERPL CALC-SCNC: 6 MMOL/L (ref 8–16)
AST SERPL-CCNC: 43 U/L (ref 10–40)
BASOPHILS # BLD AUTO: 0.01 K/UL (ref 0–0.2)
BASOPHILS NFR BLD: 0.2 % (ref 0–1.9)
BILIRUB DIRECT SERPL-MCNC: 0.8 MG/DL (ref 0.1–0.3)
BILIRUB SERPL-MCNC: 3.2 MG/DL (ref 0.1–1)
BUN SERPL-MCNC: 6 MG/DL (ref 8–23)
CALCIUM SERPL-MCNC: 8.8 MG/DL (ref 8.7–10.5)
CHLORIDE SERPL-SCNC: 105 MMOL/L (ref 95–110)
CO2 SERPL-SCNC: 30 MMOL/L (ref 23–29)
CREAT SERPL-MCNC: 0.8 MG/DL (ref 0.5–1.4)
DIFFERENTIAL METHOD BLD: NORMAL
EOSINOPHIL # BLD AUTO: 0.2 K/UL (ref 0–0.5)
EOSINOPHIL NFR BLD: 3.8 % (ref 0–8)
ERYTHROCYTE [DISTWIDTH] IN BLOOD BY AUTOMATED COUNT: 14.4 % (ref 11.5–14.5)
EST. GFR  (NO RACE VARIABLE): >60 ML/MIN/1.73 M^2
GGT SERPL-CCNC: 52 U/L (ref 8–55)
GLUCOSE SERPL-MCNC: 89 MG/DL (ref 70–110)
HAV IGG SER QL IA: REACTIVE
HBV CORE AB SERPL QL IA: NORMAL
HBV SURFACE AB SER-ACNC: <3 MIU/ML
HBV SURFACE AB SER-ACNC: NORMAL M[IU]/ML
HBV SURFACE AG SERPL QL IA: NORMAL
HCT VFR BLD AUTO: 40.7 % (ref 37–48.5)
HCV AB SERPL QL IA: NORMAL
HGB BLD-MCNC: 13.1 G/DL (ref 12–16)
IMM GRANULOCYTES # BLD AUTO: 0.01 K/UL (ref 0–0.04)
IMM GRANULOCYTES NFR BLD AUTO: 0.2 % (ref 0–0.5)
INR PPP: 1.5 (ref 0.8–1.2)
LYMPHOCYTES # BLD AUTO: 1.7 K/UL (ref 1–4.8)
LYMPHOCYTES NFR BLD: 27.1 % (ref 18–48)
MCH RBC QN AUTO: 31 PG (ref 27–31)
MCHC RBC AUTO-ENTMCNC: 32.2 G/DL (ref 32–36)
MCV RBC AUTO: 96 FL (ref 82–98)
MONOCYTES # BLD AUTO: 0.9 K/UL (ref 0.3–1)
MONOCYTES NFR BLD: 13.6 % (ref 4–15)
NEUTROPHILS # BLD AUTO: 3.4 K/UL (ref 1.8–7.7)
NEUTROPHILS NFR BLD: 55.1 % (ref 38–73)
NRBC BLD-RTO: 0 /100 WBC
PLATELET # BLD AUTO: 265 K/UL (ref 150–450)
PMV BLD AUTO: 9.7 FL (ref 9.2–12.9)
POTASSIUM SERPL-SCNC: 4.1 MMOL/L (ref 3.5–5.1)
PROT SERPL-MCNC: 5.6 G/DL (ref 6–8.4)
PROTHROMBIN TIME: 16.1 SEC (ref 9–12.5)
RBC # BLD AUTO: 4.23 M/UL (ref 4–5.4)
SODIUM SERPL-SCNC: 141 MMOL/L (ref 136–145)
WBC # BLD AUTO: 6.24 K/UL (ref 3.9–12.7)

## 2025-01-30 PROCEDURE — 99215 OFFICE O/P EST HI 40 MIN: CPT | Mod: S$PBB,TXP,, | Performed by: STUDENT IN AN ORGANIZED HEALTH CARE EDUCATION/TRAINING PROGRAM

## 2025-01-30 PROCEDURE — 82248 BILIRUBIN DIRECT: CPT | Mod: TXP | Performed by: STUDENT IN AN ORGANIZED HEALTH CARE EDUCATION/TRAINING PROGRAM

## 2025-01-30 PROCEDURE — 86790 VIRUS ANTIBODY NOS: CPT | Mod: TXP | Performed by: STUDENT IN AN ORGANIZED HEALTH CARE EDUCATION/TRAINING PROGRAM

## 2025-01-30 PROCEDURE — 82105 ALPHA-FETOPROTEIN SERUM: CPT | Mod: TXP | Performed by: STUDENT IN AN ORGANIZED HEALTH CARE EDUCATION/TRAINING PROGRAM

## 2025-01-30 PROCEDURE — 86706 HEP B SURFACE ANTIBODY: CPT | Mod: 91,TXP | Performed by: STUDENT IN AN ORGANIZED HEALTH CARE EDUCATION/TRAINING PROGRAM

## 2025-01-30 PROCEDURE — 82977 ASSAY OF GGT: CPT | Mod: TXP | Performed by: STUDENT IN AN ORGANIZED HEALTH CARE EDUCATION/TRAINING PROGRAM

## 2025-01-30 PROCEDURE — 86803 HEPATITIS C AB TEST: CPT | Mod: TXP | Performed by: STUDENT IN AN ORGANIZED HEALTH CARE EDUCATION/TRAINING PROGRAM

## 2025-01-30 PROCEDURE — 80053 COMPREHEN METABOLIC PANEL: CPT | Mod: TXP | Performed by: STUDENT IN AN ORGANIZED HEALTH CARE EDUCATION/TRAINING PROGRAM

## 2025-01-30 PROCEDURE — 36415 COLL VENOUS BLD VENIPUNCTURE: CPT | Mod: TXP | Performed by: STUDENT IN AN ORGANIZED HEALTH CARE EDUCATION/TRAINING PROGRAM

## 2025-01-30 PROCEDURE — 99214 OFFICE O/P EST MOD 30 MIN: CPT | Mod: PBBFAC,TXP | Performed by: STUDENT IN AN ORGANIZED HEALTH CARE EDUCATION/TRAINING PROGRAM

## 2025-01-30 PROCEDURE — 86704 HEP B CORE ANTIBODY TOTAL: CPT | Mod: TXP | Performed by: STUDENT IN AN ORGANIZED HEALTH CARE EDUCATION/TRAINING PROGRAM

## 2025-01-30 PROCEDURE — 87340 HEPATITIS B SURFACE AG IA: CPT | Mod: TXP | Performed by: STUDENT IN AN ORGANIZED HEALTH CARE EDUCATION/TRAINING PROGRAM

## 2025-01-30 PROCEDURE — 85610 PROTHROMBIN TIME: CPT | Mod: TXP | Performed by: STUDENT IN AN ORGANIZED HEALTH CARE EDUCATION/TRAINING PROGRAM

## 2025-01-30 PROCEDURE — 99999 PR PBB SHADOW E&M-EST. PATIENT-LVL IV: CPT | Mod: PBBFAC,TXP,, | Performed by: STUDENT IN AN ORGANIZED HEALTH CARE EDUCATION/TRAINING PROGRAM

## 2025-01-30 PROCEDURE — 85025 COMPLETE CBC W/AUTO DIFF WBC: CPT | Mod: TXP | Performed by: STUDENT IN AN ORGANIZED HEALTH CARE EDUCATION/TRAINING PROGRAM

## 2025-01-30 RX ORDER — LACTULOSE 10 G/15ML
30 SOLUTION ORAL; RECTAL 2 TIMES DAILY
COMMUNITY

## 2025-01-30 RX ORDER — TRAMADOL HYDROCHLORIDE 50 MG/1
50 TABLET ORAL 2 TIMES DAILY PRN
COMMUNITY

## 2025-01-30 NOTE — LETTER
February 2, 2025        Bear [facility]  [address]  [place] [address]  Phone: [phone]  Fax: [phone]             [facility]  [address]  [place] [address]  Phone: [phone]   Patient: Sfaia Ibarra   MR Number: [id number]   YOB: 1955   Date of Visit: 1/30/2025       Dear Dr. Bear Lopez    Thank you for referring Safia Ibarra to me for evaluation. Attached you will find relevant portions of my assessment and plan of care.    If you have questions, please do not hesitate to call me. I look forward to following Safia Ibarra along with you.    Sincerely,    Bradley Aquino MD    Enclosure    If you would like to receive this communication electronically, please contact [email or web address] or [phone] to request [employer] access.    [employer] is a tool which provides read-only access to select patient information with whom you have a relationship. Its easy to use and provides real time access to review your patients record including encounter summaries, notes, results, and demographic information.    If you feel you have received this communication in error or would no longer like to receive these types of communications, please e-mail [email or web address]

## 2025-01-30 NOTE — PROGRESS NOTES
Transplant Hepatology   Transplant Evaluation Consult Note    Referring provider: No ref. provider found  PCP: Marcelino Bullard MD    Chief complaint: MASH cirrhosis, transplant evaluation    HPI: Safia Ibarra is a 69 y.o. female who was referred to Transplant Hepatology Clinic for MASH related cirrhosis.    1/30/25:  She was last seen in April 2024 at which time she had compensated cirrhosis and low MELD.  Plan was for follow-up in 6 months.  Due to transportation issues, this was scheduled in [facility] clinic in [place].  Since her last visits, she has developed hepatic encephalopathy treated with lactulose.  She was also prescribed rifaximin which she has been unable to afford.  She takes Lasix as needed for lower extremity edema but denies recent abdominal distention.  She also denies other signs of decompensated cirrhosis including no recent jaundice or GI bleeding.    4/23/24 - Initial Visit  She says that 1 month ago she was told that she had low blood counts related to her liver but does not ever recall being told that she had any liver issues.  She did have an outside CT in November 2023 for abdominal pain that showed hepatic steatosis and cirrhosis.  There was also splenomegaly suggestive of portal hypertension.     She rarely drinks alcohol and has never been a heavy drinker. She has never received a blood transfusion and has no tattoos. She denies history of IV drug use. No known family history of liver disease. She denies signs of decompensated cirrhosis including no recent abdominal distension, encephalopathy, jaundice, GI bleeding.    Past Medical History:   Diagnosis Date    HTN (hypertension)     Hyperlipidemia     Hypothyroidism     Osteoporosis        Past Surgical History:   Procedure Laterality Date    ARTHROPLASTY OF BOTH KNEES Bilateral     BREAST BIOPSY Left     core Dr. Hannah benign over 5 yrs ago    EPIDURAL STEROID INJECTION INTO LUMBAR SPINE N/A 01/03/2022    Procedure:  Injection-steroid-epidural-lumbar L5/S1;  Surgeon: Yobani Nieves MD;  Location: [facility] OR;  Service: Pain Management;  Laterality: N/A;    EPIDURAL STEROID INJECTION INTO LUMBAR SPINE N/A 09/21/2022    Procedure: Injection-steroid-epidural-lumbar L5/S1;  Surgeon: Yobani Nieves MD;  Location: [facility] OR;  Service: Pain Management;  Laterality: N/A;    EPIDURAL STEROID INJECTION INTO LUMBAR SPINE N/A 04/26/2023    Procedure: Injection-steroid-epidural-lumbar L5/S1;  Surgeon: Yobani Nieves MD;  Location: [facility] OR;  Service: Pain Management;  Laterality: N/A;    EPIDURAL STEROID INJECTION INTO LUMBAR SPINE N/A 06/05/2023    Procedure: Injection-steroid-epidural-lumbar L4/L5;  Surgeon: Yobani Nieves MD;  Location: [facility] OR;  Service: Pain Management;  Laterality: N/A;    EPIDURAL STEROID INJECTION INTO LUMBAR SPINE N/A 09/05/2023    Procedure: Injection-steroid-epidural-lumbar L4/5;  Surgeon: Yobani Nieves MD;  Location: [facility] OR;  Service: Pain Management;  Laterality: N/A;  L4/5    EPIDURAL STEROID INJECTION INTO LUMBAR SPINE N/A 06/12/2024    Procedure: Injection-steroid-epidural-lumbar   L4/5;  Surgeon: Yobani Nieves MD;  Location: [facility] OR;  Service: Pain Management;  Laterality: N/A;    HYSTERECTOMY  2001    partial    INJECTION OF ANESTHETIC AGENT AROUND MEDIAL BRANCH NERVES INNERVATING LUMBAR FACET JOINT Bilateral 03/05/2024    Procedure: Block-nerve-medial branch-lumbar    L4/5, L5/S1;  Surgeon: Yobani Nieves MD;  Location: [facility] OR;  Service: Pain Management;  Laterality: Bilateral;    INJECTION OF ANESTHETIC AGENT AROUND MEDIAL BRANCH NERVES INNERVATING LUMBAR FACET JOINT Bilateral 04/10/2024    Procedure: Block-nerve-medial branch-lumbar-L4/5&L5/S1;  Surgeon: Yobani Nieves MD;  Location: [facility] OR;  Service: Pain Management;  Laterality: Bilateral;    RADIOFREQUENCY ABLATION OF LUMBAR MEDIAL BRANCH NERVE AT SINGLE LEVEL Bilateral 05/01/2024    Procedure: Radiofrequency Ablation, Nerve, Spinal, Lumbar,  Medial Branch, L4/5, L5/S1;  Surgeon: Yobani Nieves MD;  Location: [facility] OR;  Service: Pain Management;  Laterality: Bilateral;       Family History   Problem Relation Name Age of Onset    Uterine cancer Mother      Breast cancer Paternal Aunt          age unknown       Social History     Tobacco Use    Smoking status: Never    Smokeless tobacco: Never   Substance Use Topics    Alcohol use: Never    Drug use: Never       Current Outpatient Medications   Medication Sig Dispense Refill    acetaminophen (TYLENOL) 500 MG tablet Take 500 mg by mouth every 6 (six) hours as needed for Pain.      fesoterodine (TOVIAZ) 4 mg Tb24 Take 4 mg by mouth once daily.      gabapentin (NEURONTIN) 300 MG capsule Take 300 mg by mouth 2 (two) times daily.      levothyroxine (SYNTHROID, LEVOTHROID) 175 MCG tablet       LIDOcaine (LIDODERM) 5 % Place 1 patch onto the skin once daily.      losartan (COZAAR) 25 MG tablet TAKE 1/2 TABLET BY MOUTH ONCE DAILY IN THE EVENING 45 tablet 1    nystatin (MYCOSTATIN) cream 3 (three) times daily. Apply to affected area      ondansetron (ZOFRAN-ODT) 4 MG TbDL 4 mg every 6 (six) hours as needed.      pantoprazole (PROTONIX) 40 MG tablet Take 40 mg by mouth every morning.      rosuvastatin (CRESTOR) 10 MG tablet Take 10 mg by mouth nightly.      torsemide (DEMADEX) 20 MG Tab TAKE 1 TABLET BY MOUTH ONCE DAILY AS NEEDED SWELLING       No current facility-administered medications for this visit.       Review of patient's allergies indicates:   Allergen Reactions    Bactrim [sulfamethoxazole-trimethoprim] Itching       Review of Systems   Constitutional:  Negative for fever and weight loss.   Gastrointestinal:  Positive for nausea. Negative for abdominal pain, blood in stool, constipation, diarrhea, heartburn, melena and vomiting.       Vitals:    01/30/25 1503   BP: (!) 144/66   Pulse: 70   Resp: 18   Temp: 97.3 °F (36.3 °C)   TempSrc: Temporal   SpO2: 98%   Weight: 124.8 kg (275 lb 2.2 oz)   Height: 5'  (1.524 m)       Physical Exam  Constitutional:       General: She is not in acute distress.  Eyes:      General: No scleral icterus.  Cardiovascular:      Rate and Rhythm: Normal rate and regular rhythm.   Pulmonary:      Effort: Pulmonary effort is normal. No respiratory distress.   Abdominal:      General: Bowel sounds are normal. There is no distension.      Palpations: Abdomen is soft.      Tenderness: There is no abdominal tenderness. There is no guarding or rebound.   Skin:     Coloration: Skin is not jaundiced.         LABS: I personally reviewed pertinent laboratory findings.    Lab Results   Component Value Date    ALT 35 10/07/2024    AST 64 (H) 10/07/2024    ALKPHOS 130 10/07/2024    BILITOT 2.6 (H) 10/07/2024       Lab Results   Component Value Date    WBC 5.9 12/09/2024    HGB 13.7 12/09/2024    HCT 40.9 12/09/2024    MCV 94 10/07/2024     (L) 12/09/2024       Lab Results   Component Value Date     10/12/2024    K 3.8 10/12/2024     (H) 10/12/2024    CO2 27 10/12/2024    BUN 10 10/12/2024    CREATININE 0.89 10/12/2024    CALCIUM 8.4 (L) 10/12/2024    ANIONGAP 5 (L) 10/12/2024    ESTGFRAFRICA 71 10/12/2024       Lab Results   Component Value Date    INR 1.6 10/11/2024    INR 1.2 05/06/2024    INR 1.3 (H) 03/12/2024     Outside Labs 12/2024:  - Sodium 141  - Creatinine 0.71    - Total bilirubin 3.2  - Alkaline phosphatase 164  - AST 71  - ALT 27  - Albumin 3.3    Imaging:  I personally reviewed recent imaging studies available on the chart and from outside medical records.      Assessment:  69 y.o. female with MASH related cirrhosis. She is decompensated with HE, history of variceal bleeding.    1. 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 Hepatic encephalopathy        MELD 3.0: 17 at 1/30/2025  2:30 PM  MELD-Na: 15 at 1/30/2025  2:30 PM  Calculated from:  Serum Creatinine: 0.8 mg/dL (Using min of 1 mg/dL) at 1/30/2025  2:30 PM  Serum Sodium: 141 mmol/L (Using max of 137 mmol/L) at  1/30/2025  2:30 PM  Total Bilirubin: 3.2 mg/dL at 1/30/2025  2:30 PM  Serum Albumin: 2.9 g/dL at 1/30/2025  2:30 PM  INR(ratio): 1.5 at 1/30/2025  2:30 PM  Age at listing (hypothetical): 69 years  Sex: Female at 1/30/2025  2:30 PM        Transplant Candidacy: Patient is a 69 y.o. female with MASH related cirrhosis referred for evaluation for possible OLT. MELD 3.0 is 17. Based on available information, she is a high risk liver transplant candidate due to BMI 53.7.  I also worry about her current functional status as she reports only being able to ambulate approximately 50 feet with a walker.  Will discuss her candidacy with liver transplant selection committee.    Recommendations:  Cirrhosis due to MASH: Counseled on diet, weight loss, and control of co-morbidities. Encouraged to lose 10% of her body weight over the next 12 months.    Ascites/Edema: 2 gm Na diet.  She has no ascites but takes torsemide as needed for lower extremity edema.    Encephalopathy: Continue lactulose. Titrate to maintain 3-4 bowel movements per day.  She and been prescribed rifaximin but is unable to afford    Variceal screening:  She will schedule EGD with her local gastroenterologist.    HCC screening: US in 11/2024 without HCC. AFP 5.7. Repeat abdominal US and AFP every 6 months.    Immunizations: Recommend HAV and HBV vaccinations if not immune    UNOS Patient Status  Functional Status: 60% - Requires occasional assistance but is able to care for needs  Physical Capacity: Limited Mobility    Diabetes: No  Any previous malignancy: No  Neoadjuvant Therapy: no  Has patient ever had a dx of HCC: no  Previous Abdominal Surgery: no  Spontaneous Bacterial Peritonitis: no  History of Portal Vein Thrombosis: no  Transjugular Intrahepatic Portosystemic Shunt: no    Return to clinic in 3 months.    I have sent communication to the referring physician and/or primary care provider.    I spent a total of 60 minutes on the day of the visit. This  includes face to face time and non-face to face time preparing to see the patient (eg, review of tests), obtaining and/or reviewing separately obtained history, documenting clinical information in the electronic or other health record, independently interpreting results, and communicating results to the patient/family/caregiver, or care coordination.    This note includes dictation done using [employer] speech recognition program. Word recognition mistakes are occasionally missed on review.    Bradley Aquino MD  Staff Physician  Hepatology and Liver Transplant  [facility]  [facility] [place]

## 2025-01-30 NOTE — Clinical Note
No transplant eval for now. Will discuss candidacy with selection committee as BMI 53.  Return 3 months in transplant clinic.

## 2025-02-01 LAB
CLINICAL BIOCHEMIST REVIEW: NORMAL
PLPETH BLD-MCNC: <10 NG/ML
POPETH BLD-MCNC: <10 NG/ML

## 2025-02-02 ENCOUNTER — TELEPHONE (OUTPATIENT)
Dept: TRANSPLANT | Facility: CLINIC | Age: 70
End: 2025-02-02
Payer: MEDICARE

## 2025-02-02 NOTE — TELEPHONE ENCOUNTER
Liver Transplant Committee Discussion     Patient Name: Safia Ibarra   : 1955  MRN: [id number]    Requested by: Bradley Aquino MD    Day to be discussed: Liver discussion days: Tuesday    Transplant Coordinator: Liver Coordinators: Barbara Aaron    Patient Status: outpatient    Transplant Status: transplant status: Pre-liver    Reason for Discussion:  69-year-old female with MASH related cirrhosis complicated by HE referred for transplant evaluation. MELD 3.0 is 17.  BMI is 53.7.  She reports limited mobility, being able to ambulate only approximately 50 ft with the use of a walker.  Would like to discuss transplant candidacy.    Plan:    Route to:

## 2025-02-10 VITALS — BODY MASS INDEX: 53.99 KG/M2 | HEIGHT: 60 IN | WEIGHT: 275 LBS

## 2025-02-11 ENCOUNTER — TELEPHONE (OUTPATIENT)
Dept: PAIN MEDICINE | Facility: CLINIC | Age: 70
End: 2025-02-11
Payer: MEDICARE

## 2025-02-12 ENCOUNTER — HOSPITAL ENCOUNTER (OUTPATIENT)
Facility: HOSPITAL | Age: 70
Discharge: HOME | End: 2025-02-12
Attending: ANESTHESIOLOGY | Admitting: ANESTHESIOLOGY
Payer: MEDICARE

## 2025-02-12 ENCOUNTER — TELEPHONE (OUTPATIENT)
Dept: PAIN MEDICINE | Facility: CLINIC | Age: 70
End: 2025-02-12
Payer: MEDICARE

## 2025-02-12 ENCOUNTER — TELEPHONE (OUTPATIENT)
Dept: SURGERY | Facility: HOSPITAL | Age: 70
End: 2025-02-12
Payer: MEDICARE

## 2025-02-12 ENCOUNTER — HOSPITAL ENCOUNTER (OUTPATIENT)
Dept: RADIOLOGY | Facility: HOSPITAL | Age: 70
Discharge: HOME | End: 2025-02-12
Attending: ANESTHESIOLOGY | Admitting: ANESTHESIOLOGY
Payer: MEDICARE

## 2025-02-12 DIAGNOSIS — M54.50 LOWER BACK PAIN: ICD-10-CM

## 2025-02-12 DIAGNOSIS — M54.16 LUMBAR RADICULOPATHY: ICD-10-CM

## 2025-02-12 RX ORDER — ALPRAZOLAM 0.5 MG/1
1 TABLET, ORALLY DISINTEGRATING ORAL ONCE AS NEEDED
Status: DISCONTINUED | OUTPATIENT
Start: 2025-02-12 | End: 2025-02-12 | Stop reason: HOSPADM

## 2025-02-12 NOTE — TELEPHONE ENCOUNTER
Pt canceled in preop per Dr. Nieves. PT INR too elevated. Please reach out to schedule at later date. Thank you!

## 2025-02-12 NOTE — INTERVAL H&P NOTE
The patient has been examined and the H&P has been reviewed:    I concur with the findings and no changes have occurred since H&P was written.  The patient has an elevated INR.  INR today 1.6.  Likely due to her liver disease.  We will need to postpone this procedure until her coagulation status normalizes      There are no hospital problems to display for this patient.

## 2025-05-05 ENCOUNTER — PATIENT MESSAGE (OUTPATIENT)
Dept: HEPATOLOGY | Facility: CLINIC | Age: 70
End: 2025-05-05
Payer: MEDICARE

## 2025-06-25 ENCOUNTER — OFFICE VISIT (OUTPATIENT)
Dept: CARDIOLOGY | Facility: CLINIC | Age: 70
End: 2025-06-25
Payer: MEDICARE

## 2025-06-25 VITALS
BODY MASS INDEX: 55.35 KG/M2 | HEART RATE: 72 BPM | HEIGHT: 60 IN | DIASTOLIC BLOOD PRESSURE: 62 MMHG | WEIGHT: 281.94 LBS | SYSTOLIC BLOOD PRESSURE: 135 MMHG

## 2025-06-25 DIAGNOSIS — K75.81 NASH (NONALCOHOLIC STEATOHEPATITIS): ICD-10-CM

## 2025-06-25 DIAGNOSIS — I10 PRIMARY HYPERTENSION: Chronic | ICD-10-CM

## 2025-06-25 DIAGNOSIS — I27.20 MILD PULMONARY HYPERTENSION: ICD-10-CM

## 2025-06-25 DIAGNOSIS — I08.0 MITRAL REGURGITATION AND AORTIC STENOSIS: Primary | Chronic | ICD-10-CM

## 2025-06-25 DIAGNOSIS — E66.01 OBESITY, MORBID, BMI 50 OR HIGHER: Chronic | ICD-10-CM

## 2025-06-25 DIAGNOSIS — I65.23 CAROTID ARTERY PLAQUE, BILATERAL: Chronic | ICD-10-CM

## 2025-06-25 DIAGNOSIS — E83.42 HYPOMAGNESEMIA: ICD-10-CM

## 2025-06-25 PROCEDURE — 99214 OFFICE O/P EST MOD 30 MIN: CPT | Mod: S$GLB,,, | Performed by: INTERNAL MEDICINE

## 2025-06-25 RX ORDER — LANOLIN ALCOHOL/MO/W.PET/CERES
400 CREAM (GRAM) TOPICAL DAILY
Qty: 90 TABLET | Refills: 1 | Status: SHIPPED | OUTPATIENT
Start: 2025-06-25

## 2025-06-25 RX ORDER — AVATROMBOPAG MALEATE 20 MG/1
20 TABLET, FILM COATED ORAL 4 TIMES DAILY
COMMUNITY

## 2025-06-25 RX ORDER — SPIRONOLACTONE 25 MG/1
12.5 TABLET ORAL DAILY
Qty: 45 TABLET | Refills: 1 | Status: SHIPPED | OUTPATIENT
Start: 2025-06-25

## 2025-06-25 NOTE — PROGRESS NOTES
Subjective:    Patient ID:  Safia Ibarra is a 70 y.o. female who presents for Follow-up and Mitral regurgitation and aortic stenosis        HPI  RECENT LABS PER GI CMP WITH ELEVATED LIVER ENZYMES, ELEVATED AMMONIA LEVEL, LAST MAGNESIUM 1.4, DX WITH TAO, PUD, NO BLEEDING, NO CP/SOB, SOME REBOLLAR,OFF CRESTOR PER HEPATOLOGY,  ALSO OFF BLOOD PRESSURE MEDS, SEE ROS    Past Medical History:   Diagnosis Date    HTN (hypertension)     Hyperlipidemia     Hypothyroidism     Osteoporosis     Unspecified cirrhosis of liver     Non alcoholic     Past Surgical History:   Procedure Laterality Date    ARTHROPLASTY OF BOTH KNEES Bilateral     BREAST BIOPSY Left     core Dr. Camden marroquin over 5 yrs ago    EPIDURAL STEROID INJECTION INTO LUMBAR SPINE N/A 01/03/2022    Procedure: Injection-steroid-epidural-lumbar L5/S1;  Surgeon: Yobani Nieves MD;  Location: [facility] OR;  Service: Pain Management;  Laterality: N/A;    EPIDURAL STEROID INJECTION INTO LUMBAR SPINE N/A 09/21/2022    Procedure: Injection-steroid-epidural-lumbar L5/S1;  Surgeon: Yobani Nieves MD;  Location: [facility] OR;  Service: Pain Management;  Laterality: N/A;    EPIDURAL STEROID INJECTION INTO LUMBAR SPINE N/A 04/26/2023    Procedure: Injection-steroid-epidural-lumbar L5/S1;  Surgeon: Yobani Nieves MD;  Location: [facility] OR;  Service: Pain Management;  Laterality: N/A;    EPIDURAL STEROID INJECTION INTO LUMBAR SPINE N/A 06/05/2023    Procedure: Injection-steroid-epidural-lumbar L4/L5;  Surgeon: Yobani Nieves MD;  Location: [facility] OR;  Service: Pain Management;  Laterality: N/A;    EPIDURAL STEROID INJECTION INTO LUMBAR SPINE N/A 09/05/2023    Procedure: Injection-steroid-epidural-lumbar L4/5;  Surgeon: Yobani Nieves MD;  Location: [facility] OR;  Service: Pain Management;  Laterality: N/A;  L4/5    EPIDURAL STEROID INJECTION INTO LUMBAR SPINE N/A 06/12/2024    Procedure: Injection-steroid-epidural-lumbar   L4/5;  Surgeon: Yobani Nieves MD;  Location: [facility] OR;   Service: Pain Management;  Laterality: N/A;    HYSTERECTOMY  2001    partial    INJECTION OF ANESTHETIC AGENT AROUND MEDIAL BRANCH NERVES INNERVATING LUMBAR FACET JOINT Bilateral 03/05/2024    Procedure: Block-nerve-medial branch-lumbar    L4/5, L5/S1;  Surgeon: Yobani Nieves MD;  Location: [facility] OR;  Service: Pain Management;  Laterality: Bilateral;    INJECTION OF ANESTHETIC AGENT AROUND MEDIAL BRANCH NERVES INNERVATING LUMBAR FACET JOINT Bilateral 04/10/2024    Procedure: Block-nerve-medial branch-lumbar-L4/5&L5/S1;  Surgeon: Yobani Nieves MD;  Location: [facility] OR;  Service: Pain Management;  Laterality: Bilateral;    RADIOFREQUENCY ABLATION OF LUMBAR MEDIAL BRANCH NERVE AT SINGLE LEVEL Bilateral 05/01/2024    Procedure: Radiofrequency Ablation, Nerve, Spinal, Lumbar, Medial Branch, L4/5, L5/S1;  Surgeon: Yobani Nieves MD;  Location: [facility] OR;  Service: Pain Management;  Laterality: Bilateral;     Family History   Problem Relation Name Age of Onset    Uterine cancer Mother      Breast cancer Paternal Aunt          age unknown     Social History     Socioeconomic History    Marital status:    Tobacco Use    Smoking status: Never    Smokeless tobacco: Never   Substance and Sexual Activity    Alcohol use: Never    Drug use: Never    Sexual activity: Not Currently     Social Drivers of Health     Financial Resource Strain: Low Risk  (3/10/2025)    Received from [facility] and Its Subsidiaries and Affiliates    Overall Financial Resource Strain (CARDIA)     Difficulty of Paying Living Expenses: Not hard at all   Food Insecurity: No Food Insecurity (3/10/2025)    Received from [employer] [facility] and Its Subsidiaries and Affiliates    Hunger Vital Sign     Worried About Running Out of Food in the Last Year: Never true     Ran Out of Food in the Last Year: Never true   Transportation Needs: No Transportation Needs (3/10/2025)    [facility]    PRAPARE - Transportation     Lack of Transportation (Medical): No     Lack of Transportation (Non-Medical): No   Physical Activity: Unknown (4/17/2024)    Exercise Vital Sign     Days of Exercise per Week: 2 days   Stress: Stress Concern Present (4/17/2024)    [employer] - Occupational Stress Questionnaire     Feeling of Stress : Very much   Housing Stability: Low Risk  (3/10/2025)    Received from [facility] and [facility] and Affiliates    Housing Stability Vital Sign     Unable to Pay for Housing in the Last Year: No     Number of Times Moved in the Last Year: 0     Homeless in the Last Year: No       Review of patient's allergies indicates:   Allergen Reactions    Bactrim [sulfamethoxazole-trimethoprim] Itching     Current Medications[1]    Review of Systems   Constitutional: Negative for chills, diaphoresis, fever, malaise/fatigue and night sweats.   HENT:  Negative for congestion and nosebleeds.    Eyes:  Negative for blurred vision and redness.   Cardiovascular:  Positive for dyspnea on exertion (WEIGHT) and leg swelling. Negative for chest pain, claudication, cyanosis, irregular heartbeat, near-syncope, orthopnea, palpitations, paroxysmal nocturnal dyspnea and syncope.   Respiratory:  Negative for cough, hemoptysis, shortness of breath and wheezing.    Endocrine: Negative for polyuria.   Hematologic/Lymphatic: Negative for adenopathy. Does not bruise/bleed easily.   Skin:  Negative for color change and itching.   Musculoskeletal:  Positive for arthritis, back pain and joint pain. Negative for falls (CHRONIC).   Gastrointestinal:  Negative for abdominal pain, change in bowel habit, dysphagia, jaundice, melena and nausea.   Genitourinary:  Negative for dysuria and flank pain.   Neurological:  Negative for brief paralysis, dizziness, focal  weakness, loss of balance and numbness.   Psychiatric/Behavioral:  Negative for altered mental status and depression.    Allergic/Immunologic: Negative for persistent infections.        Objective:      Vitals:    06/25/25 1431 06/25/25 1440   BP: (!) 140/65 135/62   Pulse: 72    Weight: 127.9 kg (281 lb 15.5 oz)    Height: 5' (1.524 m)    PainSc: 0-No pain      Body mass index is 55.07 kg/m².    Physical Exam  Constitutional:       Appearance: She is morbidly obese.   HENT:      Head: Normocephalic and atraumatic.   Eyes:      Extraocular Movements: Extraocular movements intact.      Conjunctiva/sclera: Conjunctivae normal.   Cardiovascular:      Rate and Rhythm: Normal rate and regular rhythm. No extrasystoles are present.     Pulses:           Carotid pulses are 2+ on the right side and 2+ on the left side.       Radial pulses are 2+ on the right side and 2+ on the left side.        Posterior tibial pulses are 2+ on the right side and 2+ on the left side.      Heart sounds: Murmur heard.      Systolic murmur is present with a grade of 1/6 at the upper right sternal border.   Pulmonary:      Effort: Pulmonary effort is normal.      Breath sounds: Normal breath sounds.   Abdominal:      Palpations: Abdomen is soft.      Tenderness: There is no abdominal tenderness.   Musculoskeletal:      Cervical back: Neck supple.      Right lower leg: Edema (TRACE) present.      Left lower leg: Edema (TRACE) present.   Skin:     Coloration: Skin is pale.   Neurological:      Mental Status: She is alert and oriented to person, place, and time.      Cranial Nerves: Cranial nerves 2-12 are intact.   Psychiatric:         Mood and Affect: Mood normal.         Speech: Speech normal.         Behavior: Behavior normal.                 ..    Chemistry        Component Value Date/Time     01/30/2025 1430    K 4.1 01/30/2025 1430     01/30/2025 1430    CO2 30 (H) 01/30/2025 1430    BUN 6 (L) 01/30/2025 1430    CREATININE 0.8  01/30/2025 1430    GLU 89 01/30/2025 1430        Component Value Date/Time    CALCIUM 8.8 01/30/2025 1430    ALKPHOS 134 01/30/2025 1430    AST 43 (H) 01/30/2025 1430    ALT 19 01/30/2025 1430    BILITOT 3.2 (H) 01/30/2025 1430    ESTGFRAFRICA 71 10/12/2024 0541            ..  Lab Results   Component Value Date    CHOL 100 12/09/2024    CHOL 105 (L) 08/13/2024    CHOL 102 (L) 10/10/2023     Lab Results   Component Value Date    HDL 47 12/09/2024    HDL 39 (L) 08/13/2024    HDL 53 10/10/2023     Lab Results   Component Value Date    LDLCALC 34 12/09/2024    LDLCALC 50.0 (L) 08/13/2024    LDLCALC 31.4 (L) 10/10/2023     Lab Results   Component Value Date    TRIG 100 12/09/2024    TRIG 80 08/13/2024    TRIG 88 10/10/2023     Lab Results   Component Value Date    CHOLHDL 37.1 08/13/2024    CHOLHDL 52.0 (H) 10/10/2023     ..  Lab Results   Component Value Date    WBC 6.7 04/25/2025    HGB 12.5 04/25/2025    HCT 37 04/25/2025    MCV 96 01/30/2025     04/25/2025       Test(s) Reviewed  I have reviewed the following in detail:  [] Stress test   [] Angiography   [] Echocardiogram   [x] Labs   [] Other:       Assessment:         ICD-10-CM ICD-9-CM   1. Mitral regurgitation and aortic stenosis  I08.0 396.2   2. Mild pulmonary hypertension  I27.20 416.8   3. Carotid artery plaque, bilateral  I65.23 433.10     433.30   4. Obesity, morbid, BMI 50 or higher  E66.01 278.01   5. Primary hypertension  I10 401.9   6. Hypomagnesemia  E83.42 275.2   7. TAO (nonalcoholic steatohepatitis)  K75.81 571.8     Problem List Items Addressed This Visit          Cardiac/Vascular    Primary hypertension    Mitral regurgitation and aortic stenosis - Primary    Carotid artery plaque, bilateral       Endocrine    Obesity, morbid, BMI 50 or higher     Other Visit Diagnoses         Mild pulmonary hypertension          Hypomagnesemia          TAO (nonalcoholic steatohepatitis)                 Plan:     MAGNESIUM SUPPLEMENT, ADD SPIRONOLACTONE,  FOR BLOOD PRESSURE PROBABLE COMPONENT OF DIASTOLIC DYSFUNCTION ,    ALL CV CLINICALLY RELATIVELY STABLE, NO ANGINA, NO OVERT HF, NO TIA, NO CLINICAL ARRHYTHMIA,CONTINUE CURRENT MEDS, EDUCATION, DIET, EXERCISE AS MUCH AS POSSIBLE NEEDS SIGNIFICANT WEIGHT LOSS RETURN TO CLINIC IN 8-9 MONTHS        Mitral regurgitation and aortic stenosis    Mild pulmonary hypertension    Carotid artery plaque, bilateral    Obesity, morbid, BMI 50 or higher    Primary hypertension  Comments:  NEEDS TREATMENT    Hypomagnesemia    TAO (nonalcoholic steatohepatitis)    Other orders  -     magnesium oxide (MAG-OX) 400 mg (241.3 mg magnesium) tablet; Take 1 tablet (400 mg total) by mouth once daily.  Dispense: 90 tablet; Refill: 1  -     spironolactone (ALDACTONE) 25 MG tablet; Take 0.5 tablets (12.5 mg total) by mouth once daily.  Dispense: 45 tablet; Refill: 1    RTC Low level/low impact aerobic exercise 5x's/wk. Heart healthy diet and risk factor modification.    See labs and med orders.    Aerobic exercise 5x's/wk. Heart healthy diet and risk factor modification.    See labs and med orders.             [1]   Current Outpatient Medications:     avatrombopag (DOPTELET, 30 TAB PACK,) 20 mg Tab, Take 20 mg by mouth 4 (four) times daily., Disp: , Rfl:     lactulose (CHRONULAC) 10 gram/15 mL solution, Take 30 mLs by mouth 2 (two) times daily., Disp: , Rfl:     levothyroxine (SYNTHROID, LEVOTHROID) 175 MCG tablet, , Disp: , Rfl:     LIDOcaine (LIDODERM) 5 %, Place 1 patch onto the skin once daily., Disp: , Rfl:     ondansetron (ZOFRAN-ODT) 4 MG TbDL, 4 mg every 6 (six) hours as needed., Disp: , Rfl:     pantoprazole (PROTONIX) 40 MG tablet, Take 40 mg by mouth every morning., Disp: , Rfl:     torsemide (DEMADEX) 20 MG Tab, TAKE 1 TABLET BY MOUTH ONCE DAILY AS NEEDED SWELLING, Disp: , Rfl:     traMADoL (ULTRAM) 50 mg tablet, Take 50 mg by mouth 2 (two) times daily as needed., Disp: , Rfl:     acetaminophen (TYLENOL) 500 MG tablet, Take 500 mg  by mouth every 6 (six) hours as needed for Pain., Disp: , Rfl:     fesoterodine (TOVIAZ) 4 mg Tb24, Take 4 mg by mouth once daily., Disp: , Rfl:     gabapentin (NEURONTIN) 300 MG capsule, Take 300 mg by mouth 2 (two) times daily., Disp: , Rfl:     losartan (COZAAR) 25 MG tablet, TAKE 1/2 TABLET BY MOUTH ONCE DAILY IN THE EVENING, Disp: 45 tablet, Rfl: 1    magnesium oxide (MAG-OX) 400 mg (241.3 mg magnesium) tablet, Take 1 tablet (400 mg total) by mouth once daily., Disp: 90 tablet, Rfl: 1    nystatin (MYCOSTATIN) cream, 3 (three) times daily. Apply to affected area, Disp: , Rfl:     rosuvastatin (CRESTOR) 10 MG tablet, Take 10 mg by mouth nightly., Disp: , Rfl:     spironolactone (ALDACTONE) 25 MG tablet, Take 0.5 tablets (12.5 mg total) by mouth once daily., Disp: 45 tablet, Rfl: 1

## 2025-08-12 ENCOUNTER — OFFICE VISIT (OUTPATIENT)
Dept: HEPATOLOGY | Facility: CLINIC | Age: 70
End: 2025-08-12
Payer: MEDICARE

## 2025-08-12 ENCOUNTER — LAB VISIT (OUTPATIENT)
Dept: LAB | Facility: HOSPITAL | Age: 70
End: 2025-08-12
Attending: STUDENT IN AN ORGANIZED HEALTH CARE EDUCATION/TRAINING PROGRAM
Payer: MEDICARE

## 2025-08-12 ENCOUNTER — PATIENT MESSAGE (OUTPATIENT)
Dept: HEPATOLOGY | Facility: CLINIC | Age: 70
End: 2025-08-12

## 2025-08-12 VITALS
HEART RATE: 70 BPM | RESPIRATION RATE: 18 BRPM | SYSTOLIC BLOOD PRESSURE: 141 MMHG | BODY MASS INDEX: 55.35 KG/M2 | WEIGHT: 281.94 LBS | TEMPERATURE: 97 F | DIASTOLIC BLOOD PRESSURE: 65 MMHG | OXYGEN SATURATION: 97 % | HEIGHT: 60 IN

## 2025-08-12 DIAGNOSIS — K76.82 HEPATIC ENCEPHALOPATHY: ICD-10-CM

## 2025-08-12 DIAGNOSIS — K74.60 LIVER CIRRHOSIS SECONDARY TO NASH: Primary | ICD-10-CM

## 2025-08-12 DIAGNOSIS — K75.81 LIVER CIRRHOSIS SECONDARY TO NASH: Primary | ICD-10-CM

## 2025-08-12 DIAGNOSIS — K75.81 LIVER CIRRHOSIS SECONDARY TO NASH: ICD-10-CM

## 2025-08-12 DIAGNOSIS — K74.60 LIVER CIRRHOSIS SECONDARY TO NASH: ICD-10-CM

## 2025-08-12 LAB
ABSOLUTE EOSINOPHIL (OHS): 0.34 K/UL
ABSOLUTE MONOCYTE (OHS): 0.84 K/UL (ref 0.3–1)
ABSOLUTE NEUTROPHIL COUNT (OHS): 4.56 K/UL (ref 1.8–7.7)
AFP SERPL-MCNC: 2.3 NG/ML
ALBUMIN SERPL BCP-MCNC: 2.4 G/DL (ref 3.5–5.2)
ALP SERPL-CCNC: 153 UNIT/L (ref 40–150)
ALT SERPL W/O P-5'-P-CCNC: 33 UNIT/L (ref 0–55)
ANION GAP (OHS): 7 MMOL/L (ref 8–16)
AST SERPL-CCNC: 84 UNIT/L (ref 0–50)
BASOPHILS # BLD AUTO: 0.02 K/UL
BASOPHILS NFR BLD AUTO: 0.3 %
BILIRUB SERPL-MCNC: 7.8 MG/DL (ref 0.1–1)
BUN SERPL-MCNC: 7 MG/DL (ref 8–23)
CALCIUM SERPL-MCNC: 8.3 MG/DL (ref 8.7–10.5)
CHLORIDE SERPL-SCNC: 105 MMOL/L (ref 95–110)
CO2 SERPL-SCNC: 30 MMOL/L (ref 23–29)
CREAT SERPL-MCNC: 0.7 MG/DL (ref 0.5–1.4)
ERYTHROCYTE [DISTWIDTH] IN BLOOD BY AUTOMATED COUNT: 17.1 % (ref 11.5–14.5)
GFR SERPLBLD CREATININE-BSD FMLA CKD-EPI: >60 ML/MIN/1.73/M2
GLUCOSE SERPL-MCNC: 87 MG/DL (ref 70–110)
HCT VFR BLD AUTO: 37.9 % (ref 37–48.5)
HGB BLD-MCNC: 11.9 GM/DL (ref 12–16)
IMM GRANULOCYTES # BLD AUTO: 0.04 K/UL (ref 0–0.04)
IMM GRANULOCYTES NFR BLD AUTO: 0.5 % (ref 0–0.5)
INR PPP: 1.9 (ref 0.8–1.2)
LYMPHOCYTES # BLD AUTO: 1.5 K/UL (ref 1–4.8)
MCH RBC QN AUTO: 30.6 PG (ref 27–31)
MCHC RBC AUTO-ENTMCNC: 31.4 G/DL (ref 32–36)
MCV RBC AUTO: 97 FL (ref 82–98)
NUCLEATED RBC (/100WBC) (OHS): 0 /100 WBC
PLATELET # BLD AUTO: 323 K/UL (ref 150–450)
PMV BLD AUTO: 9.7 FL (ref 9.2–12.9)
POTASSIUM SERPL-SCNC: 4.6 MMOL/L (ref 3.5–5.1)
PROT SERPL-MCNC: 5.2 GM/DL (ref 6–8.4)
PROTHROMBIN TIME: 20.3 SECONDS (ref 9–12.5)
RBC # BLD AUTO: 3.89 M/UL (ref 4–5.4)
RELATIVE EOSINOPHIL (OHS): 4.7 %
RELATIVE LYMPHOCYTE (OHS): 20.5 % (ref 18–48)
RELATIVE MONOCYTE (OHS): 11.5 % (ref 4–15)
RELATIVE NEUTROPHIL (OHS): 62.5 % (ref 38–73)
SODIUM SERPL-SCNC: 142 MMOL/L (ref 136–145)
WBC # BLD AUTO: 7.3 K/UL (ref 3.9–12.7)

## 2025-08-12 PROCEDURE — 85025 COMPLETE CBC W/AUTO DIFF WBC: CPT

## 2025-08-12 PROCEDURE — 82040 ASSAY OF SERUM ALBUMIN: CPT

## 2025-08-12 PROCEDURE — 82105 ALPHA-FETOPROTEIN SERUM: CPT

## 2025-08-12 PROCEDURE — 36415 COLL VENOUS BLD VENIPUNCTURE: CPT

## 2025-08-12 PROCEDURE — 99999 PR PBB SHADOW E&M-EST. PATIENT-LVL V: CPT | Mod: PBBFAC,,, | Performed by: STUDENT IN AN ORGANIZED HEALTH CARE EDUCATION/TRAINING PROGRAM

## 2025-08-12 PROCEDURE — 99215 OFFICE O/P EST HI 40 MIN: CPT | Mod: S$PBB,,, | Performed by: STUDENT IN AN ORGANIZED HEALTH CARE EDUCATION/TRAINING PROGRAM

## 2025-08-12 PROCEDURE — 99215 OFFICE O/P EST HI 40 MIN: CPT | Mod: PBBFAC | Performed by: STUDENT IN AN ORGANIZED HEALTH CARE EDUCATION/TRAINING PROGRAM

## 2025-08-12 PROCEDURE — 85610 PROTHROMBIN TIME: CPT

## 2025-08-12 RX ORDER — SUCRALFATE 1 G/1
1 TABLET ORAL 3 TIMES DAILY
COMMUNITY

## 2025-08-12 RX ORDER — PROMETHAZINE HYDROCHLORIDE 25 MG/1
25 TABLET ORAL EVERY 6 HOURS PRN
COMMUNITY

## 2025-08-14 ENCOUNTER — HOSPITAL ENCOUNTER (OUTPATIENT)
Dept: RADIOLOGY | Facility: HOSPITAL | Age: 70
Discharge: HOME | End: 2025-08-14
Attending: INTERNAL MEDICINE
Payer: MEDICARE

## 2025-08-14 DIAGNOSIS — Z12.31 SCREENING MAMMOGRAM, ENCOUNTER FOR: ICD-10-CM

## 2025-08-14 PROCEDURE — 77067 SCR MAMMO BI INCL CAD: CPT | Mod: TC,PO

## 2025-08-14 PROCEDURE — 77067 SCR MAMMO BI INCL CAD: CPT | Mod: 26,,, | Performed by: RADIOLOGY

## 2025-08-14 PROCEDURE — 77063 BREAST TOMOSYNTHESIS BI: CPT | Mod: 26,,, | Performed by: RADIOLOGY

## 2025-08-25 ENCOUNTER — TELEPHONE (OUTPATIENT)
Dept: CARDIOLOGY | Facility: CLINIC | Age: 70
End: 2025-08-25
Payer: MEDICARE

## 2025-08-26 ENCOUNTER — HOSPITAL ENCOUNTER (OUTPATIENT)
Dept: RADIOLOGY | Facility: HOSPITAL | Age: 70
Discharge: HOME | End: 2025-08-26
Attending: STUDENT IN AN ORGANIZED HEALTH CARE EDUCATION/TRAINING PROGRAM
Payer: MEDICARE

## 2025-08-26 DIAGNOSIS — K75.81 LIVER CIRRHOSIS SECONDARY TO NASH: ICD-10-CM

## 2025-08-26 DIAGNOSIS — R06.02 SOB (SHORTNESS OF BREATH): Primary | ICD-10-CM

## 2025-08-26 DIAGNOSIS — I08.0 MITRAL REGURGITATION AND AORTIC STENOSIS: ICD-10-CM

## 2025-08-26 DIAGNOSIS — K74.60 LIVER CIRRHOSIS SECONDARY TO NASH: ICD-10-CM

## 2025-08-26 PROCEDURE — 76705 ECHO EXAM OF ABDOMEN: CPT | Mod: TC,PO

## 2025-08-26 PROCEDURE — 76705 ECHO EXAM OF ABDOMEN: CPT | Mod: 26,,, | Performed by: RADIOLOGY

## 2025-08-27 RX ORDER — TORSEMIDE 20 MG/1
20 TABLET ORAL DAILY PRN
Qty: 90 TABLET | Refills: 1 | Status: SHIPPED | OUTPATIENT
Start: 2025-08-27

## (undated) DEVICE — TRAY NERVE BLOCK

## (undated) DEVICE — GLOVE 7.5 PROTEXIS PI MICRO

## (undated) DEVICE — APPLICATOR CHLORAPREP CLR 10.5

## (undated) DEVICE — HANDLE SURG LIGHT NONRIGID

## (undated) DEVICE — TOWEL OR DISP STRL BLUE 4/PK

## (undated) DEVICE — SOL SOD CHLORIDE 0.9% 10ML

## (undated) DEVICE — NDL SPINAL 25GX3.5 SPINOCAN

## (undated) DEVICE — CANNULA VENOM 20G 10X100MM

## (undated) DEVICE — PAD ELECTROSURGICAL PAT PLATE